# Patient Record
Sex: MALE | Race: WHITE | Employment: UNEMPLOYED | URBAN - METROPOLITAN AREA
[De-identification: names, ages, dates, MRNs, and addresses within clinical notes are randomized per-mention and may not be internally consistent; named-entity substitution may affect disease eponyms.]

---

## 2017-01-01 ENCOUNTER — OFFICE VISIT (OUTPATIENT)
Dept: GERIATRIC MEDICINE | Age: 82
End: 2017-01-01

## 2017-01-01 ENCOUNTER — HOSPITAL ENCOUNTER (OUTPATIENT)
Age: 82
Discharge: NURSING, REHAB OR LONG-TERM CARE | End: 2017-09-12
Attending: INTERNAL MEDICINE | Admitting: INTERNAL MEDICINE
Payer: MEDICARE

## 2017-01-01 ENCOUNTER — APPOINTMENT (OUTPATIENT)
Dept: ULTRASOUND IMAGING | Age: 82
End: 2017-01-01
Attending: INTERNAL MEDICINE
Payer: MEDICARE

## 2017-01-01 ENCOUNTER — APPOINTMENT (OUTPATIENT)
Dept: GENERAL RADIOLOGY | Age: 82
End: 2017-01-01
Payer: MEDICARE

## 2017-01-01 ENCOUNTER — APPOINTMENT (OUTPATIENT)
Dept: CT IMAGING | Age: 82
DRG: 949 | End: 2017-01-01
Attending: PHYSICAL MEDICINE & REHABILITATION
Payer: MEDICARE

## 2017-01-01 ENCOUNTER — HOSPITAL ENCOUNTER (EMERGENCY)
Age: 82
Discharge: HOME | End: 2017-12-24
Attending: EMERGENCY MEDICINE
Payer: MEDICARE

## 2017-01-01 ENCOUNTER — OFFICE VISIT (OUTPATIENT)
Dept: CARDIOLOGY | Age: 82
End: 2017-01-01

## 2017-01-01 ENCOUNTER — HOSPITAL ENCOUNTER (INPATIENT)
Age: 82
LOS: 13 days | Discharge: NURSING, REHAB OR LONG-TERM CARE | DRG: 949 | End: 2017-09-25
Attending: PHYSICAL MEDICINE & REHABILITATION | Admitting: PHYSICAL MEDICINE & REHABILITATION
Payer: MEDICARE

## 2017-01-01 ENCOUNTER — HOSPITAL ENCOUNTER (INPATIENT)
Age: 82
LOS: 5 days | Discharge: NURSING, REHAB OR LONG-TERM CARE | DRG: 949 | End: 2017-09-12
Attending: PHYSICAL MEDICINE & REHABILITATION | Admitting: PHYSICAL MEDICINE & REHABILITATION
Payer: MEDICARE

## 2017-01-01 ENCOUNTER — ANESTHESIA EVENT (OUTPATIENT)
Dept: CARDIAC CATH/INVASIVE PROCEDURES | Age: 82
End: 2017-01-01
Payer: MEDICARE

## 2017-01-01 ENCOUNTER — ANESTHESIA (OUTPATIENT)
Dept: OPERATING ROOM | Age: 82
End: 2017-01-01
Payer: MEDICARE

## 2017-01-01 ENCOUNTER — HOSPITAL ENCOUNTER (OUTPATIENT)
Dept: CARDIAC CATH/INVASIVE PROCEDURES | Age: 82
Discharge: NURSING, REHAB OR LONG-TERM CARE | End: 2017-11-10
Attending: INTERNAL MEDICINE | Admitting: INTERNAL MEDICINE
Payer: MEDICARE

## 2017-01-01 ENCOUNTER — HOSPITAL ENCOUNTER (EMERGENCY)
Age: 82
Discharge: OTHER INSTITUTION | End: 2017-09-03
Attending: STUDENT IN AN ORGANIZED HEALTH CARE EDUCATION/TRAINING PROGRAM
Payer: MEDICARE

## 2017-01-01 ENCOUNTER — HOSPITAL ENCOUNTER (OUTPATIENT)
Dept: ULTRASOUND IMAGING | Age: 82
End: 2017-01-01
Attending: INTERNAL MEDICINE
Payer: MEDICARE

## 2017-01-01 ENCOUNTER — APPOINTMENT (OUTPATIENT)
Dept: GENERAL RADIOLOGY | Age: 82
DRG: 949 | End: 2017-01-01
Attending: PHYSICAL MEDICINE & REHABILITATION
Payer: MEDICARE

## 2017-01-01 ENCOUNTER — APPOINTMENT (OUTPATIENT)
Dept: CT IMAGING | Age: 82
End: 2017-01-01
Payer: MEDICARE

## 2017-01-01 ENCOUNTER — ANESTHESIA EVENT (OUTPATIENT)
Dept: OPERATING ROOM | Age: 82
End: 2017-01-01
Payer: MEDICARE

## 2017-01-01 ENCOUNTER — APPOINTMENT (OUTPATIENT)
Dept: CARDIAC CATH/INVASIVE PROCEDURES | Age: 82
End: 2017-01-01
Attending: INTERNAL MEDICINE
Payer: MEDICARE

## 2017-01-01 ENCOUNTER — ANESTHESIA (OUTPATIENT)
Dept: CARDIAC CATH/INVASIVE PROCEDURES | Age: 82
End: 2017-01-01
Payer: MEDICARE

## 2017-01-01 VITALS
BODY MASS INDEX: 24.71 KG/M2 | TEMPERATURE: 98.2 F | RESPIRATION RATE: 16 BRPM | WEIGHT: 163 LBS | HEART RATE: 63 BPM | OXYGEN SATURATION: 100 % | DIASTOLIC BLOOD PRESSURE: 60 MMHG | SYSTOLIC BLOOD PRESSURE: 103 MMHG | HEIGHT: 68 IN

## 2017-01-01 VITALS — SYSTOLIC BLOOD PRESSURE: 114 MMHG | TEMPERATURE: 98.8 F | DIASTOLIC BLOOD PRESSURE: 60 MMHG | HEART RATE: 70 BPM

## 2017-01-01 VITALS
TEMPERATURE: 97 F | BODY MASS INDEX: 26.68 KG/M2 | HEART RATE: 68 BPM | OXYGEN SATURATION: 98 % | HEIGHT: 67 IN | WEIGHT: 170 LBS | DIASTOLIC BLOOD PRESSURE: 65 MMHG | SYSTOLIC BLOOD PRESSURE: 103 MMHG | RESPIRATION RATE: 16 BRPM

## 2017-01-01 VITALS
WEIGHT: 154 LBS | TEMPERATURE: 97.4 F | OXYGEN SATURATION: 96 % | BODY MASS INDEX: 23.34 KG/M2 | SYSTOLIC BLOOD PRESSURE: 122 MMHG | HEART RATE: 70 BPM | RESPIRATION RATE: 18 BRPM | HEIGHT: 68 IN | DIASTOLIC BLOOD PRESSURE: 60 MMHG

## 2017-01-01 VITALS
WEIGHT: 160.2 LBS | TEMPERATURE: 97.1 F | SYSTOLIC BLOOD PRESSURE: 102 MMHG | HEART RATE: 72 BPM | RESPIRATION RATE: 16 BRPM | BODY MASS INDEX: 24.36 KG/M2 | DIASTOLIC BLOOD PRESSURE: 62 MMHG | OXYGEN SATURATION: 96 %

## 2017-01-01 VITALS
WEIGHT: 159 LBS | HEART RATE: 81 BPM | RESPIRATION RATE: 18 BRPM | DIASTOLIC BLOOD PRESSURE: 70 MMHG | OXYGEN SATURATION: 94 % | TEMPERATURE: 97 F | SYSTOLIC BLOOD PRESSURE: 92 MMHG | HEIGHT: 68 IN | BODY MASS INDEX: 24.1 KG/M2

## 2017-01-01 VITALS — DIASTOLIC BLOOD PRESSURE: 60 MMHG | HEART RATE: 67 BPM | SYSTOLIC BLOOD PRESSURE: 110 MMHG

## 2017-01-01 VITALS
WEIGHT: 180 LBS | OXYGEN SATURATION: 99 % | TEMPERATURE: 36 F | RESPIRATION RATE: 14 BRPM | SYSTOLIC BLOOD PRESSURE: 172 MMHG | DIASTOLIC BLOOD PRESSURE: 79 MMHG | HEART RATE: 82 BPM | BODY MASS INDEX: 28.25 KG/M2 | HEIGHT: 67 IN

## 2017-01-01 VITALS
TEMPERATURE: 98.4 F | HEART RATE: 67 BPM | DIASTOLIC BLOOD PRESSURE: 101 MMHG | OXYGEN SATURATION: 99 % | SYSTOLIC BLOOD PRESSURE: 176 MMHG | RESPIRATION RATE: 20 BRPM

## 2017-01-01 VITALS
HEIGHT: 68 IN | BODY MASS INDEX: 25.56 KG/M2 | TEMPERATURE: 98.6 F | RESPIRATION RATE: 20 BRPM | OXYGEN SATURATION: 100 % | WEIGHT: 168.65 LBS | SYSTOLIC BLOOD PRESSURE: 100 MMHG | HEART RATE: 72 BPM | DIASTOLIC BLOOD PRESSURE: 52 MMHG

## 2017-01-01 VITALS
SYSTOLIC BLOOD PRESSURE: 189 MMHG | OXYGEN SATURATION: 95 % | RESPIRATION RATE: 20 BRPM | DIASTOLIC BLOOD PRESSURE: 89 MMHG

## 2017-01-01 VITALS
SYSTOLIC BLOOD PRESSURE: 80 MMHG | TEMPERATURE: 97.1 F | DIASTOLIC BLOOD PRESSURE: 60 MMHG | OXYGEN SATURATION: 98 % | HEART RATE: 88 BPM | RESPIRATION RATE: 16 BRPM

## 2017-01-01 VITALS
HEIGHT: 68 IN | BODY MASS INDEX: 22.73 KG/M2 | HEART RATE: 76 BPM | DIASTOLIC BLOOD PRESSURE: 62 MMHG | OXYGEN SATURATION: 96 % | SYSTOLIC BLOOD PRESSURE: 125 MMHG | RESPIRATION RATE: 19 BRPM | WEIGHT: 150 LBS | TEMPERATURE: 97.9 F

## 2017-01-01 VITALS
HEIGHT: 68 IN | OXYGEN SATURATION: 95 % | RESPIRATION RATE: 16 BRPM | DIASTOLIC BLOOD PRESSURE: 53 MMHG | SYSTOLIC BLOOD PRESSURE: 106 MMHG | BODY MASS INDEX: 24.1 KG/M2 | HEART RATE: 60 BPM | WEIGHT: 159 LBS | TEMPERATURE: 98.6 F

## 2017-01-01 VITALS — RESPIRATION RATE: 12 BRPM | SYSTOLIC BLOOD PRESSURE: 136 MMHG | HEART RATE: 43 BPM | DIASTOLIC BLOOD PRESSURE: 68 MMHG

## 2017-01-01 VITALS
OXYGEN SATURATION: 96 % | HEART RATE: 75 BPM | TEMPERATURE: 97.8 F | WEIGHT: 180 LBS | HEIGHT: 67 IN | DIASTOLIC BLOOD PRESSURE: 88 MMHG | BODY MASS INDEX: 28.25 KG/M2 | RESPIRATION RATE: 12 BRPM | SYSTOLIC BLOOD PRESSURE: 164 MMHG

## 2017-01-01 VITALS — OXYGEN SATURATION: 98 % | SYSTOLIC BLOOD PRESSURE: 151 MMHG | DIASTOLIC BLOOD PRESSURE: 80 MMHG

## 2017-01-01 VITALS
TEMPERATURE: 96.1 F | HEART RATE: 82 BPM | SYSTOLIC BLOOD PRESSURE: 113 MMHG | DIASTOLIC BLOOD PRESSURE: 60 MMHG | RESPIRATION RATE: 14 BRPM

## 2017-01-01 DIAGNOSIS — R41.0 CONFUSION AND DISORIENTATION: ICD-10-CM

## 2017-01-01 DIAGNOSIS — T14.8XXA HEMATOMA: ICD-10-CM

## 2017-01-01 DIAGNOSIS — S06.359A: Primary | ICD-10-CM

## 2017-01-01 DIAGNOSIS — R55 SYNCOPE AND COLLAPSE: ICD-10-CM

## 2017-01-01 DIAGNOSIS — N40.0 BENIGN PROSTATIC HYPERPLASIA, PRESENCE OF LOWER URINARY TRACT SYMPTOMS UNSPECIFIED, UNSPECIFIED MORPHOLOGY: ICD-10-CM

## 2017-01-01 DIAGNOSIS — R53.1 WEAKNESS: ICD-10-CM

## 2017-01-01 DIAGNOSIS — E78.5 DYSLIPIDEMIA: Primary | ICD-10-CM

## 2017-01-01 DIAGNOSIS — I15.9 SECONDARY HYPERTENSION: ICD-10-CM

## 2017-01-01 DIAGNOSIS — S00.83XA FACIAL CONTUSION, INITIAL ENCOUNTER: ICD-10-CM

## 2017-01-01 DIAGNOSIS — I15.9 SECONDARY HYPERTENSION: Primary | ICD-10-CM

## 2017-01-01 DIAGNOSIS — R26.81 UNSTEADINESS: Primary | ICD-10-CM

## 2017-01-01 DIAGNOSIS — I70.229 CRITICAL LOWER LIMB ISCHEMIA (HCC): ICD-10-CM

## 2017-01-01 DIAGNOSIS — I10 ESSENTIAL HYPERTENSION: ICD-10-CM

## 2017-01-01 DIAGNOSIS — R21 RASH: ICD-10-CM

## 2017-01-01 DIAGNOSIS — Z95.0 PACEMAKER: ICD-10-CM

## 2017-01-01 DIAGNOSIS — E11.8 DM TYPE 2, CONTROLLED, WITH COMPLICATION (HCC): ICD-10-CM

## 2017-01-01 DIAGNOSIS — E78.2 MIXED HYPERLIPIDEMIA: ICD-10-CM

## 2017-01-01 DIAGNOSIS — R46.89 AGGRESSION: ICD-10-CM

## 2017-01-01 DIAGNOSIS — I95.9 HYPOTENSION, UNSPECIFIED HYPOTENSION TYPE: ICD-10-CM

## 2017-01-01 DIAGNOSIS — E78.5 DYSLIPIDEMIA: ICD-10-CM

## 2017-01-01 DIAGNOSIS — R62.7 ADULT FAILURE TO THRIVE: ICD-10-CM

## 2017-01-01 DIAGNOSIS — Z79.01 ANTICOAGULATED BY ANTICOAGULATION TREATMENT: ICD-10-CM

## 2017-01-01 DIAGNOSIS — R53.1 GENERALIZED WEAKNESS: ICD-10-CM

## 2017-01-01 DIAGNOSIS — R13.10 DYSPHAGIA, UNSPECIFIED TYPE: ICD-10-CM
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
## 2017-01-01 DIAGNOSIS — E11.9 TYPE 2 DIABETES MELLITUS WITHOUT COMPLICATION, UNSPECIFIED LONG TERM INSULIN USE STATUS: ICD-10-CM

## 2017-01-01 DIAGNOSIS — I61.9 HEMORRHAGIC CEREBROVASCULAR ACCIDENT (CVA) (HCC): Primary | ICD-10-CM

## 2017-01-01 DIAGNOSIS — R53.1 GENERALIZED WEAKNESS: Primary | ICD-10-CM

## 2017-01-01 DIAGNOSIS — K21.9 GASTROESOPHAGEAL REFLUX DISEASE, ESOPHAGITIS PRESENCE NOT SPECIFIED: ICD-10-CM

## 2017-01-01 DIAGNOSIS — R47.02 DYSPHASIA: Primary | ICD-10-CM

## 2017-01-01 DIAGNOSIS — I73.9 PAD (PERIPHERAL ARTERY DISEASE) (HCC): Primary | ICD-10-CM
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
## 2017-01-01 DIAGNOSIS — I48.91 ATRIAL FIBRILLATION, UNSPECIFIED TYPE (HCC): ICD-10-CM

## 2017-01-01 DIAGNOSIS — I48.91 ATRIAL FIBRILLATION, UNSPECIFIED TYPE (HCC): Primary | ICD-10-CM

## 2017-01-01 DIAGNOSIS — D64.9 ANEMIA, UNSPECIFIED TYPE: ICD-10-CM

## 2017-01-01 DIAGNOSIS — S90.821A BLISTER OF RIGHT FOOT, INITIAL ENCOUNTER: Primary | ICD-10-CM

## 2017-01-01 DIAGNOSIS — N30.00 ACUTE CYSTITIS WITHOUT HEMATURIA: Primary | ICD-10-CM

## 2017-01-01 DIAGNOSIS — I95.89 OTHER SPECIFIED HYPOTENSION: ICD-10-CM

## 2017-01-01 LAB
ABO/RH: NORMAL
ALBUMIN SERPL-MCNC: 3.3 G/DL (ref 3.9–4.9)
ALBUMIN SERPL-MCNC: 3.4 G/DL (ref 3.9–4.9)
ALBUMIN SERPL-MCNC: 4 G/DL (ref 3.9–4.9)
ALP BLD-CCNC: 459 U/L (ref 35–104)
ALP BLD-CCNC: 529 U/L (ref 35–104)
ALP BLD-CCNC: 611 U/L (ref 35–104)
ALT SERPL-CCNC: 27 U/L (ref 0–41)
ALT SERPL-CCNC: 79 U/L (ref 0–41)
ALT SERPL-CCNC: 80 U/L (ref 0–41)
AMMONIA: 47 UMOL/L (ref 16–60)
AMORPHOUS: ABNORMAL
ANION GAP SERPL CALCULATED.3IONS-SCNC: 10 MEQ/L (ref 7–13)
ANION GAP SERPL CALCULATED.3IONS-SCNC: 10 MEQ/L (ref 7–13)
ANION GAP SERPL CALCULATED.3IONS-SCNC: 12 MEQ/L (ref 7–13)
ANION GAP SERPL CALCULATED.3IONS-SCNC: 13 MEQ/L (ref 7–13)
ANION GAP SERPL CALCULATED.3IONS-SCNC: 14 MEQ/L (ref 7–13)
ANION GAP SERPL CALCULATED.3IONS-SCNC: 15 MEQ/L (ref 7–13)
ANION GAP SERPL CALCULATED.3IONS-SCNC: 16 MEQ/L (ref 7–13)
ANION GAP SERPL CALCULATED.3IONS-SCNC: 16 MEQ/L (ref 7–13)
ANION GAP SERPL CALCULATED.3IONS-SCNC: 17 MEQ/L (ref 7–13)
ANION GAP SERPL CALCULATED.3IONS-SCNC: 18 MEQ/L (ref 7–13)
ANION GAP SERPL CALCULATED.3IONS-SCNC: 18 MEQ/L (ref 7–13)
ANION GAP SERPL CALCULATED.3IONS-SCNC: 21 MEQ/L (ref 7–13)
ANTIBODY SCREEN: NORMAL
APTT: 20.8 SEC (ref 21.6–35.4)
AST SERPL-CCNC: 27 U/L (ref 0–40)
AST SERPL-CCNC: 77 U/L (ref 0–40)
AST SERPL-CCNC: 78 U/L (ref 0–40)
BACTERIA: ABNORMAL /HPF
BACTERIA: NORMAL /HPF
BASOPHILS ABSOLUTE: 0 K/UL (ref 0–0.2)
BASOPHILS ABSOLUTE: 0.1 K/UL (ref 0–0.2)
BASOPHILS RELATIVE PERCENT: 0.3 %
BASOPHILS RELATIVE PERCENT: 0.9 %
BILIRUB SERPL-MCNC: 0.7 MG/DL (ref 0–1.2)
BILIRUB SERPL-MCNC: 1.1 MG/DL (ref 0–1.2)
BILIRUB SERPL-MCNC: 1.2 MG/DL (ref 0–1.2)
BILIRUBIN URINE: NEGATIVE
BLOOD, URINE: NEGATIVE
BUN BLDV-MCNC: 13 MG/DL (ref 8–23)
BUN BLDV-MCNC: 14 MG/DL (ref 8–23)
BUN BLDV-MCNC: 18 MG/DL (ref 8–23)
BUN BLDV-MCNC: 22 MG/DL (ref 8–23)
BUN BLDV-MCNC: 24 MG/DL (ref 8–23)
BUN BLDV-MCNC: 26 MG/DL (ref 8–23)
BUN BLDV-MCNC: 27 MG/DL (ref 8–23)
BUN BLDV-MCNC: 33 MG/DL (ref 8–23)
BUN BLDV-MCNC: 38 MG/DL (ref 8–23)
BUN BLDV-MCNC: 40 MG/DL (ref 8–23)
BUN BLDV-MCNC: 43 MG/DL (ref 8–23)
BUN BLDV-MCNC: 43 MG/DL (ref 8–23)
BUN BLDV-MCNC: 44 MG/DL (ref 8–23)
BUN BLDV-MCNC: 59 MG/DL (ref 8–23)
C-REACTIVE PROTEIN, HIGH SENSITIVITY: 13.2 MG/L (ref 0–5)
CALCIUM SERPL-MCNC: 8.2 MG/DL (ref 8.6–10.2)
CALCIUM SERPL-MCNC: 8.3 MG/DL (ref 8.6–10.2)
CALCIUM SERPL-MCNC: 8.7 MG/DL (ref 8.6–10.2)
CALCIUM SERPL-MCNC: 8.9 MG/DL (ref 8.6–10.2)
CALCIUM SERPL-MCNC: 8.9 MG/DL (ref 8.6–10.2)
CALCIUM SERPL-MCNC: 9 MG/DL (ref 8.6–10.2)
CALCIUM SERPL-MCNC: 9.1 MG/DL (ref 8.6–10.2)
CALCIUM SERPL-MCNC: 9.1 MG/DL (ref 8.6–10.2)
CALCIUM SERPL-MCNC: 9.2 MG/DL (ref 8.6–10.2)
CALCIUM SERPL-MCNC: 9.3 MG/DL (ref 8.6–10.2)
CALCIUM SERPL-MCNC: 9.4 MG/DL (ref 8.6–10.2)
CALCIUM SERPL-MCNC: 9.4 MG/DL (ref 8.6–10.2)
CASTS: ABNORMAL /LPF
CHLORIDE BLD-SCNC: 100 MEQ/L (ref 98–107)
CHLORIDE BLD-SCNC: 100 MEQ/L (ref 98–107)
CHLORIDE BLD-SCNC: 101 MEQ/L (ref 98–107)
CHLORIDE BLD-SCNC: 102 MEQ/L (ref 98–107)
CHLORIDE BLD-SCNC: 103 MEQ/L (ref 98–107)
CHLORIDE BLD-SCNC: 104 MEQ/L (ref 98–107)
CHLORIDE BLD-SCNC: 106 MEQ/L (ref 98–107)
CHLORIDE BLD-SCNC: 92 MEQ/L (ref 98–107)
CHLORIDE BLD-SCNC: 93 MEQ/L (ref 98–107)
CHLORIDE BLD-SCNC: 94 MEQ/L (ref 98–107)
CHLORIDE BLD-SCNC: 95 MEQ/L (ref 98–107)
CHLORIDE BLD-SCNC: 95 MEQ/L (ref 98–107)
CHLORIDE BLD-SCNC: 97 MEQ/L (ref 98–107)
CHLORIDE BLD-SCNC: 99 MEQ/L (ref 98–107)
CHOLESTEROL, TOTAL: 124 MG/DL (ref 0–199)
CHOLESTEROL, TOTAL: 140 MG/DL (ref 0–199)
CHP ED QC CHECK: NORMAL
CK MB: 9.2 NG/ML (ref 0–6.7)
CLARITY: ABNORMAL
CLARITY: CLEAR
CO2: 18 MEQ/L (ref 22–29)
CO2: 21 MEQ/L (ref 22–29)
CO2: 22 MEQ/L (ref 22–29)
CO2: 22 MEQ/L (ref 22–29)
CO2: 23 MEQ/L (ref 22–29)
CO2: 23 MEQ/L (ref 22–29)
CO2: 24 MEQ/L (ref 22–29)
CO2: 24 MEQ/L (ref 22–29)
CO2: 26 MEQ/L (ref 22–29)
CO2: 27 MEQ/L (ref 22–29)
CO2: 27 MEQ/L (ref 22–29)
CO2: 28 MEQ/L (ref 22–29)
CO2: 28 MEQ/L (ref 22–29)
CO2: 29 MEQ/L (ref 22–29)
COLOR: YELLOW
CREAT SERPL-MCNC: 0.73 MG/DL (ref 0.7–1.2)
CREAT SERPL-MCNC: 0.82 MG/DL (ref 0.7–1.2)
CREAT SERPL-MCNC: 0.83 MG/DL (ref 0.7–1.2)
CREAT SERPL-MCNC: 0.84 MG/DL (ref 0.7–1.2)
CREAT SERPL-MCNC: 0.86 MG/DL (ref 0.7–1.2)
CREAT SERPL-MCNC: 0.86 MG/DL (ref 0.7–1.2)
CREAT SERPL-MCNC: 0.9 MG/DL (ref 0.7–1.2)
CREAT SERPL-MCNC: 0.95 MG/DL (ref 0.7–1.2)
CREAT SERPL-MCNC: 1.03 MG/DL (ref 0.7–1.2)
CREAT SERPL-MCNC: 1.22 MG/DL (ref 0.7–1.2)
CREAT SERPL-MCNC: 1.26 MG/DL (ref 0.7–1.2)
CREATINE KINASE-MB INDEX: 2.3 % (ref 0–3.5)
D DIMER: 0.57 MG/L FEU (ref 0–0.5)
EKG ATRIAL RATE: 102 BPM
EKG Q-T INTERVAL: 418 MS
EKG QRS DURATION: 72 MS
EKG QTC CALCULATION (BAZETT): 482 MS
EKG R AXIS: 1 DEGREES
EKG T AXIS: -84 DEGREES
EKG VENTRICULAR RATE: 80 BPM
EOSINOPHILS ABSOLUTE: 0 K/UL (ref 0–0.7)
EOSINOPHILS ABSOLUTE: 0.8 K/UL (ref 0–0.7)
EOSINOPHILS RELATIVE PERCENT: 0 %
EOSINOPHILS RELATIVE PERCENT: 14.6 %
EPITHELIAL CELLS, UA: ABNORMAL /HPF
ETHANOL PERCENT: NORMAL G/DL
ETHANOL: <10 MG/DL (ref 0–0.08)
GFR AFRICAN AMERICAN: >60
GFR NON-AFRICAN AMERICAN: 54
GFR NON-AFRICAN AMERICAN: 56.1
GFR NON-AFRICAN AMERICAN: >60
GLOBULIN: 2.9 G/DL (ref 2.3–3.5)
GLOBULIN: 3.2 G/DL (ref 2.3–3.5)
GLOBULIN: 3.5 G/DL (ref 2.3–3.5)
GLUCOSE BLD-MCNC: 101 MG/DL (ref 60–115)
GLUCOSE BLD-MCNC: 102 MG/DL (ref 60–115)
GLUCOSE BLD-MCNC: 103 MG/DL (ref 60–115)
GLUCOSE BLD-MCNC: 103 MG/DL (ref 60–115)
GLUCOSE BLD-MCNC: 106 MG/DL (ref 60–115)
GLUCOSE BLD-MCNC: 106 MG/DL (ref 60–115)
GLUCOSE BLD-MCNC: 107 MG/DL (ref 60–115)
GLUCOSE BLD-MCNC: 107 MG/DL (ref 60–115)
GLUCOSE BLD-MCNC: 109 MG/DL (ref 60–115)
GLUCOSE BLD-MCNC: 111 MG/DL (ref 60–115)
GLUCOSE BLD-MCNC: 115 MG/DL (ref 60–115)
GLUCOSE BLD-MCNC: 115 MG/DL (ref 74–109)
GLUCOSE BLD-MCNC: 117 MG/DL (ref 60–115)
GLUCOSE BLD-MCNC: 117 MG/DL (ref 60–115)
GLUCOSE BLD-MCNC: 118 MG/DL (ref 60–115)
GLUCOSE BLD-MCNC: 119 MG/DL (ref 60–115)
GLUCOSE BLD-MCNC: 120 MG/DL (ref 60–115)
GLUCOSE BLD-MCNC: 121 MG/DL (ref 74–109)
GLUCOSE BLD-MCNC: 121 MG/DL (ref 74–109)
GLUCOSE BLD-MCNC: 126 MG/DL (ref 60–115)
GLUCOSE BLD-MCNC: 128 MG/DL (ref 74–109)
GLUCOSE BLD-MCNC: 129 MG/DL (ref 60–115)
GLUCOSE BLD-MCNC: 130 MG/DL (ref 60–115)
GLUCOSE BLD-MCNC: 132 MG/DL (ref 60–115)
GLUCOSE BLD-MCNC: 134 MG/DL (ref 60–115)
GLUCOSE BLD-MCNC: 141 MG/DL (ref 60–115)
GLUCOSE BLD-MCNC: 141 MG/DL (ref 74–109)
GLUCOSE BLD-MCNC: 143 MG/DL (ref 60–115)
GLUCOSE BLD-MCNC: 145 MG/DL (ref 60–115)
GLUCOSE BLD-MCNC: 145 MG/DL (ref 74–109)
GLUCOSE BLD-MCNC: 146 MG/DL (ref 60–115)
GLUCOSE BLD-MCNC: 147 MG/DL (ref 60–115)
GLUCOSE BLD-MCNC: 147 MG/DL (ref 60–115)
GLUCOSE BLD-MCNC: 147 MG/DL (ref 74–109)
GLUCOSE BLD-MCNC: 148 MG/DL (ref 60–115)
GLUCOSE BLD-MCNC: 148 MG/DL (ref 60–115)
GLUCOSE BLD-MCNC: 149 MG/DL (ref 60–115)
GLUCOSE BLD-MCNC: 151 MG/DL (ref 60–115)
GLUCOSE BLD-MCNC: 152 MG/DL (ref 60–115)
GLUCOSE BLD-MCNC: 152 MG/DL (ref 60–115)
GLUCOSE BLD-MCNC: 155 MG/DL (ref 60–115)
GLUCOSE BLD-MCNC: 155 MG/DL (ref 60–115)
GLUCOSE BLD-MCNC: 157 MG/DL (ref 60–115)
GLUCOSE BLD-MCNC: 158 MG/DL (ref 60–115)
GLUCOSE BLD-MCNC: 160 MG/DL (ref 60–115)
GLUCOSE BLD-MCNC: 161 MG/DL (ref 60–115)
GLUCOSE BLD-MCNC: 162 MG/DL (ref 60–115)
GLUCOSE BLD-MCNC: 167 MG/DL (ref 60–115)
GLUCOSE BLD-MCNC: 169 MG/DL (ref 60–115)
GLUCOSE BLD-MCNC: 170 MG/DL (ref 60–115)
GLUCOSE BLD-MCNC: 170 MG/DL (ref 60–115)
GLUCOSE BLD-MCNC: 173 MG/DL (ref 60–115)
GLUCOSE BLD-MCNC: 175 MG/DL (ref 60–115)
GLUCOSE BLD-MCNC: 176 MG/DL (ref 60–115)
GLUCOSE BLD-MCNC: 176 MG/DL (ref 60–115)
GLUCOSE BLD-MCNC: 181 MG/DL (ref 60–115)
GLUCOSE BLD-MCNC: 183 MG/DL (ref 60–115)
GLUCOSE BLD-MCNC: 185 MG/DL (ref 60–115)
GLUCOSE BLD-MCNC: 186 MG/DL
GLUCOSE BLD-MCNC: 186 MG/DL (ref 60–115)
GLUCOSE BLD-MCNC: 187 MG/DL
GLUCOSE BLD-MCNC: 187 MG/DL
GLUCOSE BLD-MCNC: 187 MG/DL (ref 60–115)
GLUCOSE BLD-MCNC: 187 MG/DL (ref 60–115)
GLUCOSE BLD-MCNC: 188 MG/DL (ref 60–115)
GLUCOSE BLD-MCNC: 188 MG/DL (ref 74–109)
GLUCOSE BLD-MCNC: 191 MG/DL (ref 60–115)
GLUCOSE BLD-MCNC: 192 MG/DL (ref 60–115)
GLUCOSE BLD-MCNC: 192 MG/DL (ref 60–115)
GLUCOSE BLD-MCNC: 198 MG/DL (ref 60–115)
GLUCOSE BLD-MCNC: 202 MG/DL (ref 60–115)
GLUCOSE BLD-MCNC: 203 MG/DL (ref 60–115)
GLUCOSE BLD-MCNC: 207 MG/DL (ref 60–115)
GLUCOSE BLD-MCNC: 217 MG/DL (ref 60–115)
GLUCOSE BLD-MCNC: 71 MG/DL (ref 60–115)
GLUCOSE BLD-MCNC: 80 MG/DL (ref 74–109)
GLUCOSE BLD-MCNC: 85 MG/DL (ref 60–115)
GLUCOSE BLD-MCNC: 86 MG/DL (ref 60–115)
GLUCOSE BLD-MCNC: 86 MG/DL (ref 74–109)
GLUCOSE BLD-MCNC: 88 MG/DL (ref 60–115)
GLUCOSE BLD-MCNC: 89 MG/DL (ref 60–115)
GLUCOSE BLD-MCNC: 90 MG/DL (ref 60–115)
GLUCOSE BLD-MCNC: 90 MG/DL (ref 60–115)
GLUCOSE BLD-MCNC: 92 MG/DL (ref 60–115)
GLUCOSE BLD-MCNC: 93 MG/DL (ref 60–115)
GLUCOSE BLD-MCNC: 94 MG/DL (ref 60–115)
GLUCOSE BLD-MCNC: 94 MG/DL (ref 74–109)
GLUCOSE BLD-MCNC: 94 MG/DL (ref 74–109)
GLUCOSE BLD-MCNC: 96 MG/DL (ref 74–109)
GLUCOSE BLD-MCNC: 98 MG/DL (ref 60–115)
GLUCOSE BLD-MCNC: 99 MG/DL (ref 74–109)
GLUCOSE URINE: NEGATIVE MG/DL
HBA1C MFR BLD: 6.3 % (ref 4.8–5.9)
HCT VFR BLD CALC: 34.4 % (ref 42–52)
HCT VFR BLD CALC: 36.3 % (ref 42–52)
HCT VFR BLD CALC: 38.2 % (ref 42–52)
HCT VFR BLD CALC: 39.8 % (ref 42–52)
HCT VFR BLD CALC: 40 % (ref 42–52)
HCT VFR BLD CALC: 40.5 % (ref 42–52)
HCT VFR BLD CALC: 40.8 % (ref 42–52)
HCT VFR BLD CALC: 40.9 % (ref 42–52)
HCT VFR BLD CALC: 41.7 % (ref 42–52)
HCT VFR BLD CALC: 42.4 % (ref 42–52)
HCT VFR BLD CALC: 42.7 % (ref 42–52)
HDLC SERPL-MCNC: 40 MG/DL (ref 40–59)
HDLC SERPL-MCNC: 75 MG/DL (ref 40–59)
HEMOGLOBIN: 11.4 G/DL (ref 14–18)
HEMOGLOBIN: 11.8 G/DL (ref 14–18)
HEMOGLOBIN: 12.8 G/DL (ref 14–18)
HEMOGLOBIN: 13 G/DL (ref 14–18)
HEMOGLOBIN: 13.1 G/DL (ref 14–18)
HEMOGLOBIN: 13.3 G/DL (ref 14–18)
HEMOGLOBIN: 13.4 G/DL (ref 14–18)
HEMOGLOBIN: 13.5 G/DL (ref 14–18)
HEMOGLOBIN: 13.7 G/DL (ref 14–18)
HEMOGLOBIN: 13.8 G/DL (ref 14–18)
HEMOGLOBIN: 14.2 G/DL (ref 14–18)
INR BLD: 1.1
KETONES, URINE: NEGATIVE MG/DL
LDL CHOLESTEROL CALCULATED: 51 MG/DL (ref 0–129)
LDL CHOLESTEROL CALCULATED: 63 MG/DL (ref 0–129)
LEUKOCYTE ESTERASE, URINE: ABNORMAL
LEUKOCYTE ESTERASE, URINE: NEGATIVE
LYMPHOCYTES ABSOLUTE: 0.8 K/UL (ref 1–4.8)
LYMPHOCYTES ABSOLUTE: 1.2 K/UL (ref 1–4.8)
LYMPHOCYTES RELATIVE PERCENT: 20.5 %
LYMPHOCYTES RELATIVE PERCENT: 7.7 %
MAGNESIUM: 1.7 MG/DL (ref 1.7–2.3)
MAGNESIUM: 1.9 MG/DL (ref 1.7–2.3)
MCH RBC QN AUTO: 30.5 PG (ref 27–31.3)
MCH RBC QN AUTO: 30.6 PG (ref 27–31.3)
MCH RBC QN AUTO: 30.6 PG (ref 27–31.3)
MCH RBC QN AUTO: 30.7 PG (ref 27–31.3)
MCH RBC QN AUTO: 30.9 PG (ref 27–31.3)
MCH RBC QN AUTO: 31 PG (ref 27–31.3)
MCH RBC QN AUTO: 31.9 PG (ref 27–31.3)
MCH RBC QN AUTO: 32.4 PG (ref 27–31.3)
MCH RBC QN AUTO: 32.7 PG (ref 27–31.3)
MCHC RBC AUTO-ENTMCNC: 32.4 % (ref 33–37)
MCHC RBC AUTO-ENTMCNC: 32.4 % (ref 33–37)
MCHC RBC AUTO-ENTMCNC: 32.6 % (ref 33–37)
MCHC RBC AUTO-ENTMCNC: 32.7 % (ref 33–37)
MCHC RBC AUTO-ENTMCNC: 32.7 % (ref 33–37)
MCHC RBC AUTO-ENTMCNC: 32.8 % (ref 33–37)
MCHC RBC AUTO-ENTMCNC: 33.1 % (ref 33–37)
MCHC RBC AUTO-ENTMCNC: 33.5 % (ref 33–37)
MCHC RBC AUTO-ENTMCNC: 33.6 % (ref 33–37)
MCV RBC AUTO: 92.6 FL (ref 80–100)
MCV RBC AUTO: 93.2 FL (ref 80–100)
MCV RBC AUTO: 93.3 FL (ref 80–100)
MCV RBC AUTO: 93.4 FL (ref 80–100)
MCV RBC AUTO: 93.7 FL (ref 80–100)
MCV RBC AUTO: 94.1 FL (ref 80–100)
MCV RBC AUTO: 94.5 FL (ref 80–100)
MCV RBC AUTO: 94.6 FL (ref 80–100)
MCV RBC AUTO: 96.4 FL (ref 80–100)
MCV RBC AUTO: 96.4 FL (ref 80–100)
MCV RBC AUTO: 97.5 FL (ref 80–100)
MONOCYTES ABSOLUTE: 0.9 K/UL (ref 0.2–0.8)
MONOCYTES ABSOLUTE: 0.9 K/UL (ref 0.2–0.8)
MONOCYTES RELATIVE PERCENT: 16.6 %
MONOCYTES RELATIVE PERCENT: 8.1 %
NEUTROPHILS ABSOLUTE: 2.7 K/UL (ref 1.4–6.5)
NEUTROPHILS ABSOLUTE: 8.9 K/UL (ref 1.4–6.5)
NEUTROPHILS RELATIVE PERCENT: 47.4 %
NEUTROPHILS RELATIVE PERCENT: 83.9 %
NITRITE, URINE: NEGATIVE
ORGANISM: ABNORMAL
PDW BLD-RTO: 14.1 % (ref 11.5–14.5)
PDW BLD-RTO: 14.2 % (ref 11.5–14.5)
PDW BLD-RTO: 14.4 % (ref 11.5–14.5)
PDW BLD-RTO: 14.5 % (ref 11.5–14.5)
PDW BLD-RTO: 14.5 % (ref 11.5–14.5)
PDW BLD-RTO: 14.8 % (ref 11.5–14.5)
PDW BLD-RTO: 14.9 % (ref 11.5–14.5)
PDW BLD-RTO: 15.4 % (ref 11.5–14.5)
PERFORMED ON: ABNORMAL
PERFORMED ON: NORMAL
PH UA: 5.5 (ref 5–9)
PH UA: 6 (ref 5–9)
PH UA: 6 (ref 5–9)
PH UA: 8.5 (ref 5–9)
PLATELET # BLD: 115 K/UL (ref 130–400)
PLATELET # BLD: 117 K/UL (ref 130–400)
PLATELET # BLD: 119 K/UL (ref 130–400)
PLATELET # BLD: 122 K/UL (ref 130–400)
PLATELET # BLD: 124 K/UL (ref 130–400)
PLATELET # BLD: 127 K/UL (ref 130–400)
PLATELET # BLD: 131 K/UL (ref 130–400)
PLATELET # BLD: 134 K/UL (ref 130–400)
PLATELET # BLD: 149 K/UL (ref 130–400)
PLATELET # BLD: 150 K/UL (ref 130–400)
PLATELET # BLD: 187 K/UL (ref 130–400)
POC ACTIVATED CLOTTING TIME KAOLIN: 191 SEC (ref 82–152)
POC ACTIVATED CLOTTING TIME KAOLIN: 219 SEC (ref 82–152)
POC ACTIVATED CLOTTING TIME KAOLIN: 235 SEC (ref 82–152)
POC SAMPLE TYPE: ABNORMAL
POTASSIUM SERPL-SCNC: 3.5 MEQ/L (ref 3.5–5.1)
POTASSIUM SERPL-SCNC: 3.6 MEQ/L (ref 3.5–5.1)
POTASSIUM SERPL-SCNC: 3.8 MEQ/L (ref 3.5–5.1)
POTASSIUM SERPL-SCNC: 3.8 MEQ/L (ref 3.5–5.1)
POTASSIUM SERPL-SCNC: 3.9 MEQ/L (ref 3.5–5.1)
POTASSIUM SERPL-SCNC: 4 MEQ/L (ref 3.5–5.1)
POTASSIUM SERPL-SCNC: 4.1 MEQ/L (ref 3.5–5.1)
POTASSIUM SERPL-SCNC: 4.2 MEQ/L (ref 3.5–5.1)
POTASSIUM SERPL-SCNC: 4.3 MEQ/L (ref 3.5–5.1)
POTASSIUM SERPL-SCNC: 4.4 MEQ/L (ref 3.5–5.1)
POTASSIUM SERPL-SCNC: 4.5 MEQ/L (ref 3.5–5.1)
POTASSIUM SERPL-SCNC: 5.5 MEQ/L (ref 3.5–5.1)
PRO-BNP: 4057 PG/ML
PROTEIN UA: NEGATIVE MG/DL
PROTHROMBIN TIME: 11.9 SEC (ref 8.1–13.7)
RBC # BLD: 3.57 M/UL (ref 4.7–6.1)
RBC # BLD: 3.86 M/UL (ref 4.7–6.1)
RBC # BLD: 3.92 M/UL (ref 4.7–6.1)
RBC # BLD: 4.13 M/UL (ref 4.7–6.1)
RBC # BLD: 4.27 M/UL (ref 4.7–6.1)
RBC # BLD: 4.28 M/UL (ref 4.7–6.1)
RBC # BLD: 4.31 M/UL (ref 4.7–6.1)
RBC # BLD: 4.41 M/UL (ref 4.7–6.1)
RBC # BLD: 4.46 M/UL (ref 4.7–6.1)
RBC # BLD: 4.55 M/UL (ref 4.7–6.1)
RBC # BLD: 4.57 M/UL (ref 4.7–6.1)
RBC UA: ABNORMAL /HPF (ref 0–2)
RBC UA: ABNORMAL /HPF (ref 0–2)
RBC UA: NORMAL /HPF (ref 0–2)
SODIUM BLD-SCNC: 135 MEQ/L (ref 132–144)
SODIUM BLD-SCNC: 137 MEQ/L (ref 132–144)
SODIUM BLD-SCNC: 138 MEQ/L (ref 132–144)
SODIUM BLD-SCNC: 140 MEQ/L (ref 132–144)
SODIUM BLD-SCNC: 140 MEQ/L (ref 132–144)
SODIUM BLD-SCNC: 143 MEQ/L (ref 132–144)
SPECIFIC GRAVITY UA: 1 (ref 1–1.03)
SPECIFIC GRAVITY UA: 1.01 (ref 1–1.03)
SPECIFIC GRAVITY UA: 1.02 (ref 1–1.03)
SPECIFIC GRAVITY UA: 1.02 (ref 1–1.03)
TOTAL CK: 405 U/L (ref 0–190)
TOTAL PROTEIN: 6.2 G/DL (ref 6.4–8.1)
TOTAL PROTEIN: 6.6 G/DL (ref 6.4–8.1)
TOTAL PROTEIN: 7.5 G/DL (ref 6.4–8.1)
TRIGL SERPL-MCNC: 103 MG/DL (ref 0–200)
TRIGL SERPL-MCNC: 68 MG/DL (ref 0–200)
TROPONIN: <0.01 NG/ML (ref 0–0.01)
URINE CULTURE, ROUTINE: ABNORMAL
URINE CULTURE, ROUTINE: ABNORMAL
URINE REFLEX TO CULTURE: NORMAL
UROBILINOGEN, URINE: 1 E.U./DL
WBC # BLD: 10.6 K/UL (ref 4.8–10.8)
WBC # BLD: 10.6 K/UL (ref 4.8–10.8)
WBC # BLD: 5.7 K/UL (ref 4.8–10.8)
WBC # BLD: 6.3 K/UL (ref 4.8–10.8)
WBC # BLD: 6.4 K/UL (ref 4.8–10.8)
WBC # BLD: 6.5 K/UL (ref 4.8–10.8)
WBC # BLD: 6.6 K/UL (ref 4.8–10.8)
WBC # BLD: 7 K/UL (ref 4.8–10.8)
WBC # BLD: 7.4 K/UL (ref 4.8–10.8)
WBC # BLD: 7.7 K/UL (ref 4.8–10.8)
WBC # BLD: 8.2 K/UL (ref 4.8–10.8)
WBC UA: ABNORMAL /HPF (ref 0–5)
WBC UA: ABNORMAL /HPF (ref 0–5)
WBC UA: NORMAL /HPF (ref 0–5)

## 2017-01-01 PROCEDURE — 92507 TX SP LANG VOICE COMM INDIV: CPT

## 2017-01-01 PROCEDURE — 1180000000 HC REHAB R&B

## 2017-01-01 PROCEDURE — 2580000003 HC RX 258: Performed by: PHYSICAL MEDICINE & REHABILITATION

## 2017-01-01 PROCEDURE — 97116 GAIT TRAINING THERAPY: CPT | Performed by: INTERNAL MEDICINE

## 2017-01-01 PROCEDURE — 97542 WHEELCHAIR MNGMENT TRAINING: CPT | Performed by: INTERNAL MEDICINE

## 2017-01-01 PROCEDURE — C1725 CATH, TRANSLUMIN NON-LASER: HCPCS

## 2017-01-01 PROCEDURE — 86900 BLOOD TYPING SEROLOGIC ABO: CPT

## 2017-01-01 PROCEDURE — 6370000000 HC RX 637 (ALT 250 FOR IP): Performed by: INTERNAL MEDICINE

## 2017-01-01 PROCEDURE — 99233 SBSQ HOSP IP/OBS HIGH 50: CPT | Performed by: PHYSICAL MEDICINE & REHABILITATION

## 2017-01-01 PROCEDURE — 2580000003 HC RX 258: Performed by: NURSE ANESTHETIST, CERTIFIED REGISTERED

## 2017-01-01 PROCEDURE — 1123F ACP DISCUSS/DSCN MKR DOCD: CPT | Performed by: NURSE PRACTITIONER

## 2017-01-01 PROCEDURE — 85025 COMPLETE CBC W/AUTO DIFF WBC: CPT

## 2017-01-01 PROCEDURE — 6370000000 HC RX 637 (ALT 250 FOR IP): Performed by: PHYSICAL MEDICINE & REHABILITATION

## 2017-01-01 PROCEDURE — 37228 HC TIB PER TERRITORY PLASTY: CPT | Performed by: INTERNAL MEDICINE

## 2017-01-01 PROCEDURE — 99232 SBSQ HOSP IP/OBS MODERATE 35: CPT | Performed by: PHYSICAL MEDICINE & REHABILITATION

## 2017-01-01 PROCEDURE — 97535 SELF CARE MNGMENT TRAINING: CPT

## 2017-01-01 PROCEDURE — 7100000000 HC PACU RECOVERY - FIRST 15 MIN: Performed by: SURGERY

## 2017-01-01 PROCEDURE — 37226 PR REVSC OPN/PRQ FEM/POP W/STNT/ANGIOP SM VSL: CPT | Performed by: INTERNAL MEDICINE

## 2017-01-01 PROCEDURE — 2580000003 HC RX 258: Performed by: INTERNAL MEDICINE

## 2017-01-01 PROCEDURE — 80048 BASIC METABOLIC PNL TOTAL CA: CPT

## 2017-01-01 PROCEDURE — 97535 SELF CARE MNGMENT TRAINING: CPT | Performed by: INTERNAL MEDICINE

## 2017-01-01 PROCEDURE — 99306 1ST NF CARE HIGH MDM 50: CPT | Performed by: INTERNAL MEDICINE

## 2017-01-01 PROCEDURE — 84132 ASSAY OF SERUM POTASSIUM: CPT

## 2017-01-01 PROCEDURE — G8427 DOCREV CUR MEDS BY ELIG CLIN: HCPCS | Performed by: INTERNAL MEDICINE

## 2017-01-01 PROCEDURE — 83880 ASSAY OF NATRIURETIC PEPTIDE: CPT

## 2017-01-01 PROCEDURE — 36415 COLL VENOUS BLD VENIPUNCTURE: CPT

## 2017-01-01 PROCEDURE — 6360000002 HC RX W HCPCS: Performed by: PHYSICAL MEDICINE & REHABILITATION

## 2017-01-01 PROCEDURE — S0028 INJECTION, FAMOTIDINE, 20 MG: HCPCS | Performed by: PHYSICAL MEDICINE & REHABILITATION

## 2017-01-01 PROCEDURE — 75716 ARTERY X-RAYS ARMS/LEGS: CPT | Performed by: INTERNAL MEDICINE

## 2017-01-01 PROCEDURE — 97530 THERAPEUTIC ACTIVITIES: CPT

## 2017-01-01 PROCEDURE — 1123F ACP DISCUSS/DSCN MKR DOCD: CPT | Performed by: INTERNAL MEDICINE

## 2017-01-01 PROCEDURE — 92526 ORAL FUNCTION THERAPY: CPT

## 2017-01-01 PROCEDURE — 82550 ASSAY OF CK (CPK): CPT

## 2017-01-01 PROCEDURE — 99285 EMERGENCY DEPT VISIT HI MDM: CPT

## 2017-01-01 PROCEDURE — 97110 THERAPEUTIC EXERCISES: CPT | Performed by: INTERNAL MEDICINE

## 2017-01-01 PROCEDURE — 99217 PR OBSERVATION CARE DISCHARGE MANAGEMENT: CPT | Performed by: INTERNAL MEDICINE

## 2017-01-01 PROCEDURE — 2500000003 HC RX 250 WO HCPCS: Performed by: PHYSICAL MEDICINE & REHABILITATION

## 2017-01-01 PROCEDURE — 2580000003 HC RX 258

## 2017-01-01 PROCEDURE — 1111F DSCHRG MED/CURRENT MED MERGE: CPT | Performed by: INTERNAL MEDICINE

## 2017-01-01 PROCEDURE — 2500000003 HC RX 250 WO HCPCS: Performed by: SURGERY

## 2017-01-01 PROCEDURE — 6360000002 HC RX W HCPCS: Performed by: STUDENT IN AN ORGANIZED HEALTH CARE EDUCATION/TRAINING PROGRAM

## 2017-01-01 PROCEDURE — 97112 NEUROMUSCULAR REEDUCATION: CPT

## 2017-01-01 PROCEDURE — 99223 1ST HOSP IP/OBS HIGH 75: CPT | Performed by: PHYSICAL MEDICINE & REHABILITATION

## 2017-01-01 PROCEDURE — 82948 REAGENT STRIP/BLOOD GLUCOSE: CPT

## 2017-01-01 PROCEDURE — 99304 1ST NF CARE SF/LOW MDM 25: CPT | Performed by: INTERNAL MEDICINE

## 2017-01-01 PROCEDURE — G8484 FLU IMMUNIZE NO ADMIN: HCPCS | Performed by: INTERNAL MEDICINE

## 2017-01-01 PROCEDURE — 99999 PR OFFICE/OUTPT VISIT,PROCEDURE ONLY: CPT | Performed by: INTERNAL MEDICINE

## 2017-01-01 PROCEDURE — 85027 COMPLETE CBC AUTOMATED: CPT

## 2017-01-01 PROCEDURE — G8420 CALC BMI NORM PARAMETERS: HCPCS | Performed by: INTERNAL MEDICINE

## 2017-01-01 PROCEDURE — 80053 COMPREHEN METABOLIC PANEL: CPT

## 2017-01-01 PROCEDURE — 76937 US GUIDE VASCULAR ACCESS: CPT

## 2017-01-01 PROCEDURE — 97167 OT EVAL HIGH COMPLEX 60 MIN: CPT

## 2017-01-01 PROCEDURE — 6360000002 HC RX W HCPCS: Performed by: NURSE ANESTHETIST, CERTIFIED REGISTERED

## 2017-01-01 PROCEDURE — 97110 THERAPEUTIC EXERCISES: CPT

## 2017-01-01 PROCEDURE — C9113 INJ PANTOPRAZOLE SODIUM, VIA: HCPCS | Performed by: PHYSICAL MEDICINE & REHABILITATION

## 2017-01-01 PROCEDURE — 2720000001 HC MISC SURG SUPPLY STERILE $51-500

## 2017-01-01 PROCEDURE — 97112 NEUROMUSCULAR REEDUCATION: CPT | Performed by: INTERNAL MEDICINE

## 2017-01-01 PROCEDURE — C1887 CATHETER, GUIDING: HCPCS

## 2017-01-01 PROCEDURE — 85730 THROMBOPLASTIN TIME PARTIAL: CPT

## 2017-01-01 PROCEDURE — 7100000001 HC PACU RECOVERY - ADDTL 15 MIN: Performed by: SURGERY

## 2017-01-01 PROCEDURE — 99221 1ST HOSP IP/OBS SF/LOW 40: CPT | Performed by: INTERNAL MEDICINE

## 2017-01-01 PROCEDURE — 3700000001 HC ADD 15 MINUTES (ANESTHESIA)

## 2017-01-01 PROCEDURE — 3700000000 HC ANESTHESIA ATTENDED CARE: Performed by: SURGERY

## 2017-01-01 PROCEDURE — 74230 X-RAY XM SWLNG FUNCJ C+: CPT

## 2017-01-01 PROCEDURE — 6370000000 HC RX 637 (ALT 250 FOR IP): Performed by: NURSE PRACTITIONER

## 2017-01-01 PROCEDURE — G8598 ASA/ANTIPLAT THER USED: HCPCS | Performed by: INTERNAL MEDICINE

## 2017-01-01 PROCEDURE — C9132 KCENTRA, PER I.U.: HCPCS | Performed by: STUDENT IN AN ORGANIZED HEALTH CARE EDUCATION/TRAINING PROGRAM

## 2017-01-01 PROCEDURE — 92523 SPEECH SOUND LANG COMPREHEN: CPT

## 2017-01-01 PROCEDURE — 85379 FIBRIN DEGRADATION QUANT: CPT

## 2017-01-01 PROCEDURE — 97163 PT EVAL HIGH COMPLEX 45 MIN: CPT

## 2017-01-01 PROCEDURE — 71010 XR CHEST PORTABLE: CPT

## 2017-01-01 PROCEDURE — 3609017100 HC EGD: Performed by: SURGERY

## 2017-01-01 PROCEDURE — 84484 ASSAY OF TROPONIN QUANT: CPT

## 2017-01-01 PROCEDURE — 97542 WHEELCHAIR MNGMENT TRAINING: CPT

## 2017-01-01 PROCEDURE — 81003 URINALYSIS AUTO W/O SCOPE: CPT

## 2017-01-01 PROCEDURE — 1036F TOBACCO NON-USER: CPT | Performed by: INTERNAL MEDICINE

## 2017-01-01 PROCEDURE — 93000 ELECTROCARDIOGRAM COMPLETE: CPT | Performed by: INTERNAL MEDICINE

## 2017-01-01 PROCEDURE — 99308 SBSQ NF CARE LOW MDM 20: CPT | Performed by: NURSE PRACTITIONER

## 2017-01-01 PROCEDURE — 99309 SBSQ NF CARE MODERATE MDM 30: CPT | Performed by: NURSE PRACTITIONER

## 2017-01-01 PROCEDURE — 2500000003 HC RX 250 WO HCPCS: Performed by: RADIOLOGY

## 2017-01-01 PROCEDURE — 37226 HC FEM POP TERR PLASTY AND STENT: CPT | Performed by: INTERNAL MEDICINE

## 2017-01-01 PROCEDURE — 85610 PROTHROMBIN TIME: CPT

## 2017-01-01 PROCEDURE — 94664 DEMO&/EVAL PT USE INHALER: CPT

## 2017-01-01 PROCEDURE — 4040F PNEUMOC VAC/ADMIN/RCVD: CPT | Performed by: INTERNAL MEDICINE

## 2017-01-01 PROCEDURE — 93010 ELECTROCARDIOGRAM REPORT: CPT | Performed by: INTERNAL MEDICINE

## 2017-01-01 PROCEDURE — 70450 CT HEAD/BRAIN W/O DYE: CPT

## 2017-01-01 PROCEDURE — 97532 HC OT DEVELOP COGNITIVE SKILLS 15MIN: CPT

## 2017-01-01 PROCEDURE — 80061 LIPID PANEL: CPT

## 2017-01-01 PROCEDURE — C1769 GUIDE WIRE: HCPCS

## 2017-01-01 PROCEDURE — 3700000000 HC ANESTHESIA ATTENDED CARE

## 2017-01-01 PROCEDURE — 3609018000 HC EGD ESOPHAGOGASTRODUODENOSCOPY PEG TUBE INSERTION: Performed by: SURGERY

## 2017-01-01 PROCEDURE — 97140 MANUAL THERAPY 1/> REGIONS: CPT

## 2017-01-01 PROCEDURE — 99231 SBSQ HOSP IP/OBS SF/LOW 25: CPT | Performed by: PHYSICAL MEDICINE & REHABILITATION

## 2017-01-01 PROCEDURE — 6360000002 HC RX W HCPCS

## 2017-01-01 PROCEDURE — 82553 CREATINE MB FRACTION: CPT

## 2017-01-01 PROCEDURE — 82140 ASSAY OF AMMONIA: CPT

## 2017-01-01 PROCEDURE — 93005 ELECTROCARDIOGRAM TRACING: CPT

## 2017-01-01 PROCEDURE — 97532 HC COGNITIVE THERAPY 15 MIN: CPT

## 2017-01-01 PROCEDURE — 74176 CT ABD & PELVIS W/O CONTRAST: CPT

## 2017-01-01 PROCEDURE — 85347 COAGULATION TIME ACTIVATED: CPT

## 2017-01-01 PROCEDURE — 3700000001 HC ADD 15 MINUTES (ANESTHESIA): Performed by: SURGERY

## 2017-01-01 PROCEDURE — 37232 PR REVSC OPN/PRQ TIB/PERO W/ANGIOPLASTY UNI EA VSL: CPT | Performed by: INTERNAL MEDICINE

## 2017-01-01 PROCEDURE — 2700000000 HC OXYGEN THERAPY PER DAY

## 2017-01-01 PROCEDURE — 86901 BLOOD TYPING SEROLOGIC RH(D): CPT

## 2017-01-01 PROCEDURE — 99238 HOSP IP/OBS DSCHRG MGMT 30/<: CPT | Performed by: PHYSICAL MEDICINE & REHABILITATION

## 2017-01-01 PROCEDURE — 92610 EVALUATE SWALLOWING FUNCTION: CPT

## 2017-01-01 PROCEDURE — 2500000003 HC RX 250 WO HCPCS: Performed by: NURSE ANESTHETIST, CERTIFIED REGISTERED

## 2017-01-01 PROCEDURE — 97150 GROUP THERAPEUTIC PROCEDURES: CPT

## 2017-01-01 PROCEDURE — 83735 ASSAY OF MAGNESIUM: CPT

## 2017-01-01 PROCEDURE — 92611 MOTION FLUOROSCOPY/SWALLOW: CPT

## 2017-01-01 PROCEDURE — 86850 RBC ANTIBODY SCREEN: CPT

## 2017-01-01 PROCEDURE — 99213 OFFICE O/P EST LOW 20 MIN: CPT | Performed by: INTERNAL MEDICINE

## 2017-01-01 PROCEDURE — 37228 PR REVSC OPN/PRQ TIB/PERO W/ANGIOPLASTY UNI: CPT | Performed by: INTERNAL MEDICINE

## 2017-01-01 PROCEDURE — 72125 CT NECK SPINE W/O DYE: CPT

## 2017-01-01 PROCEDURE — 99231 SBSQ HOSP IP/OBS SF/LOW 25: CPT | Performed by: INTERNAL MEDICINE

## 2017-01-01 PROCEDURE — 99204 OFFICE O/P NEW MOD 45 MIN: CPT | Performed by: INTERNAL MEDICINE

## 2017-01-01 PROCEDURE — 6370000000 HC RX 637 (ALT 250 FOR IP): Performed by: EMERGENCY MEDICINE

## 2017-01-01 PROCEDURE — 81001 URINALYSIS AUTO W/SCOPE: CPT

## 2017-01-01 PROCEDURE — 96374 THER/PROPH/DIAG INJ IV PUSH: CPT

## 2017-01-01 PROCEDURE — 2580000003 HC RX 258: Performed by: SURGERY

## 2017-01-01 PROCEDURE — 2500000003 HC RX 250 WO HCPCS

## 2017-01-01 PROCEDURE — C1894 INTRO/SHEATH, NON-LASER: HCPCS

## 2017-01-01 PROCEDURE — 86141 C-REACTIVE PROTEIN HS: CPT

## 2017-01-01 PROCEDURE — G0480 DRUG TEST DEF 1-7 CLASSES: HCPCS

## 2017-01-01 RX ORDER — ONDANSETRON 2 MG/ML
4 INJECTION INTRAMUSCULAR; INTRAVENOUS
Status: DISCONTINUED | OUTPATIENT
Start: 2017-01-01 | End: 2017-01-01 | Stop reason: HOSPADM

## 2017-01-01 RX ORDER — SODIUM CHLORIDE 9 MG/ML
INJECTION, SOLUTION INTRAVENOUS CONTINUOUS
Status: DISCONTINUED | OUTPATIENT
Start: 2017-01-01 | End: 2017-01-01

## 2017-01-01 RX ORDER — GUAIFENESIN 100 MG/5ML
10 SOLUTION ORAL EVERY 4 HOURS PRN
Status: DISCONTINUED | OUTPATIENT
Start: 2017-01-01 | End: 2017-01-01 | Stop reason: HOSPADM

## 2017-01-01 RX ORDER — DOXAZOSIN MESYLATE 1 MG/1
1 TABLET ORAL NIGHTLY
Qty: 30 TABLET | Refills: 3
Start: 2017-01-01 | End: 2017-01-01 | Stop reason: SDUPTHER

## 2017-01-01 RX ORDER — LISINOPRIL 2.5 MG/1
2.5 TABLET ORAL DAILY
Qty: 30 TABLET | Refills: 0
Start: 2017-01-01 | End: 2017-01-01 | Stop reason: SDUPTHER

## 2017-01-01 RX ORDER — LISINOPRIL AND HYDROCHLOROTHIAZIDE 25; 20 MG/1; MG/1
1 TABLET ORAL DAILY
Qty: 30 TABLET | Refills: 3 | Status: SHIPPED | OUTPATIENT
Start: 2017-01-01 | End: 2017-01-01 | Stop reason: SDUPTHER

## 2017-01-01 RX ORDER — DOXAZOSIN MESYLATE 1 MG/1
1 TABLET ORAL NIGHTLY
Status: DISCONTINUED | OUTPATIENT
Start: 2017-01-01 | End: 2017-01-01 | Stop reason: HOSPADM

## 2017-01-01 RX ORDER — PANTOPRAZOLE SODIUM 40 MG/1
40 TABLET, DELAYED RELEASE ORAL DAILY
Status: DISCONTINUED | OUTPATIENT
Start: 2017-01-01 | End: 2017-01-01

## 2017-01-01 RX ORDER — ASPIRIN 325 MG
325 TABLET ORAL ONCE
Status: DISCONTINUED | OUTPATIENT
Start: 2017-01-01 | End: 2017-01-01

## 2017-01-01 RX ORDER — DEXTROSE MONOHYDRATE 25 G/50ML
12.5 INJECTION, SOLUTION INTRAVENOUS PRN
Status: DISCONTINUED | OUTPATIENT
Start: 2017-01-01 | End: 2017-01-01 | Stop reason: HOSPADM

## 2017-01-01 RX ORDER — 0.9 % SODIUM CHLORIDE 0.9 %
10 VIAL (ML) INJECTION DAILY
Status: CANCELLED | OUTPATIENT
Start: 2017-01-01

## 2017-01-01 RX ORDER — ATORVASTATIN CALCIUM 10 MG/1
10 TABLET, FILM COATED ORAL NIGHTLY
Status: DISCONTINUED | OUTPATIENT
Start: 2017-01-01 | End: 2017-01-01

## 2017-01-01 RX ORDER — ATORVASTATIN CALCIUM 20 MG/1
20 TABLET, FILM COATED ORAL NIGHTLY
Status: DISCONTINUED | OUTPATIENT
Start: 2017-01-01 | End: 2017-01-01

## 2017-01-01 RX ORDER — ATORVASTATIN CALCIUM 10 MG/1
10 TABLET, FILM COATED ORAL NIGHTLY
Status: DISCONTINUED | OUTPATIENT
Start: 2017-01-01 | End: 2017-01-01 | Stop reason: HOSPADM

## 2017-01-01 RX ORDER — LISINOPRIL 20 MG/1
40 TABLET ORAL EVERY MORNING
Status: DISCONTINUED | OUTPATIENT
Start: 2017-01-01 | End: 2017-01-01

## 2017-01-01 RX ORDER — NITROFURANTOIN 25; 75 MG/1; MG/1
100 CAPSULE ORAL EVERY 12 HOURS SCHEDULED
Status: DISCONTINUED | OUTPATIENT
Start: 2017-01-01 | End: 2017-01-01 | Stop reason: HOSPADM

## 2017-01-01 RX ORDER — LABETALOL HYDROCHLORIDE 5 MG/ML
10 INJECTION, SOLUTION INTRAVENOUS EVERY 30 MIN PRN
Status: DISCONTINUED | OUTPATIENT
Start: 2017-01-01 | End: 2017-01-01 | Stop reason: SDUPTHER

## 2017-01-01 RX ORDER — TAMSULOSIN HYDROCHLORIDE 0.4 MG/1
0.4 CAPSULE ORAL NIGHTLY
Qty: 30 CAPSULE | Refills: 3 | Status: SHIPPED | OUTPATIENT
Start: 2017-01-01 | End: 2017-01-01 | Stop reason: SDUPTHER

## 2017-01-01 RX ORDER — NITROFURANTOIN 25; 75 MG/1; MG/1
100 CAPSULE ORAL EVERY 12 HOURS SCHEDULED
Status: CANCELLED | OUTPATIENT
Start: 2017-01-01

## 2017-01-01 RX ORDER — 0.9 % SODIUM CHLORIDE 0.9 %
10 VIAL (ML) INJECTION DAILY
Status: DISCONTINUED | OUTPATIENT
Start: 2017-01-01 | End: 2017-01-01 | Stop reason: ALTCHOICE

## 2017-01-01 RX ORDER — SODIUM CHLORIDE 0.9 % (FLUSH) 0.9 %
10 SYRINGE (ML) INJECTION EVERY 12 HOURS SCHEDULED
Status: DISCONTINUED | OUTPATIENT
Start: 2017-01-01 | End: 2017-01-01

## 2017-01-01 RX ORDER — ACETAMINOPHEN 325 MG/1
650 TABLET ORAL EVERY 4 HOURS PRN
Status: CANCELLED | OUTPATIENT
Start: 2017-01-01

## 2017-01-01 RX ORDER — SODIUM PHOSPHATE, DIBASIC AND SODIUM PHOSPHATE, MONOBASIC 7; 19 G/133ML; G/133ML
1 ENEMA RECTAL PRN
Status: CANCELLED | OUTPATIENT
Start: 2017-01-01

## 2017-01-01 RX ORDER — SULFAMETHOXAZOLE AND TRIMETHOPRIM 800; 160 MG/1; MG/1
1 TABLET ORAL 2 TIMES DAILY
Qty: 20 TABLET | Refills: 0 | Status: SHIPPED | OUTPATIENT
Start: 2017-01-01 | End: 2018-01-01

## 2017-01-01 RX ORDER — PROPOFOL 10 MG/ML
INJECTION, EMULSION INTRAVENOUS PRN
Status: DISCONTINUED | OUTPATIENT
Start: 2017-01-01 | End: 2017-01-01 | Stop reason: SDUPTHER

## 2017-01-01 RX ORDER — TAMSULOSIN HYDROCHLORIDE 0.4 MG/1
0.4 CAPSULE ORAL NIGHTLY
Status: CANCELLED | OUTPATIENT
Start: 2017-01-01

## 2017-01-01 RX ORDER — ALBUTEROL SULFATE 2.5 MG/3ML
2.5 SOLUTION RESPIRATORY (INHALATION) EVERY 4 HOURS PRN
COMMUNITY
End: 2017-01-01 | Stop reason: SDUPTHER

## 2017-01-01 RX ORDER — SODIUM CHLORIDE 450 MG/100ML
INJECTION, SOLUTION INTRAVENOUS CONTINUOUS
Status: DISCONTINUED | OUTPATIENT
Start: 2017-01-01 | End: 2017-01-01

## 2017-01-01 RX ORDER — LISINOPRIL AND HYDROCHLOROTHIAZIDE 25; 20 MG/1; MG/1
1 TABLET ORAL DAILY
COMMUNITY
End: 2017-01-01 | Stop reason: ALTCHOICE

## 2017-01-01 RX ORDER — MAGNESIUM OXIDE 400 MG/1
400 TABLET ORAL DAILY
Status: ON HOLD | COMMUNITY
End: 2018-01-01 | Stop reason: HOSPADM

## 2017-01-01 RX ORDER — DEXTROSE MONOHYDRATE 50 MG/ML
100 INJECTION, SOLUTION INTRAVENOUS PRN
Status: DISCONTINUED | OUTPATIENT
Start: 2017-01-01 | End: 2017-01-01 | Stop reason: HOSPADM

## 2017-01-01 RX ORDER — CHLORAL HYDRATE 500 MG
1000 CAPSULE ORAL
Status: DISCONTINUED | OUTPATIENT
Start: 2017-01-01 | End: 2017-01-01

## 2017-01-01 RX ORDER — ESCITALOPRAM OXALATE 10 MG/1
5 TABLET ORAL DAILY
Status: DISCONTINUED | OUTPATIENT
Start: 2017-01-01 | End: 2017-01-01

## 2017-01-01 RX ORDER — ONDANSETRON 2 MG/ML
INJECTION INTRAMUSCULAR; INTRAVENOUS PRN
Status: DISCONTINUED | OUTPATIENT
Start: 2017-01-01 | End: 2017-01-01 | Stop reason: SDUPTHER

## 2017-01-01 RX ORDER — TAMSULOSIN HYDROCHLORIDE 0.4 MG/1
0.4 CAPSULE ORAL NIGHTLY
Status: DISCONTINUED | OUTPATIENT
Start: 2017-01-01 | End: 2017-01-01 | Stop reason: HOSPADM

## 2017-01-01 RX ORDER — ATORVASTATIN CALCIUM 10 MG/1
10 TABLET, FILM COATED ORAL DAILY
COMMUNITY
End: 2018-01-01

## 2017-01-01 RX ORDER — CITALOPRAM 10 MG/1
10 TABLET ORAL DAILY
COMMUNITY
End: 2017-01-01 | Stop reason: SDUPTHER

## 2017-01-01 RX ORDER — SODIUM POLYSTYRENE SULFONATE 15 G/60ML
15 SUSPENSION ORAL; RECTAL ONCE
Status: COMPLETED | OUTPATIENT
Start: 2017-01-01 | End: 2017-01-01

## 2017-01-01 RX ORDER — CINNAMON
1 OIL (ML) MISCELLANEOUS 4 TIMES DAILY
Status: DISCONTINUED | OUTPATIENT
Start: 2017-01-01 | End: 2017-01-01 | Stop reason: HOSPADM

## 2017-01-01 RX ORDER — UREA 10 %
2 LOTION (ML) TOPICAL EVERY MORNING
Status: ON HOLD | COMMUNITY
End: 2018-01-01 | Stop reason: HOSPADM

## 2017-01-01 RX ORDER — METHADONE HYDROCHLORIDE 5 MG/1
5 TABLET ORAL 2 TIMES DAILY
Status: DISCONTINUED | OUTPATIENT
Start: 2017-01-01 | End: 2017-01-01 | Stop reason: HOSPADM

## 2017-01-01 RX ORDER — ATORVASTATIN CALCIUM 10 MG/1
10 TABLET, FILM COATED ORAL NIGHTLY
Status: CANCELLED | OUTPATIENT
Start: 2017-01-01

## 2017-01-01 RX ORDER — METHADONE HYDROCHLORIDE 5 MG/1
5 TABLET ORAL 2 TIMES DAILY
Qty: 60 TABLET | Refills: 0 | Status: SHIPPED | OUTPATIENT
Start: 2017-01-01 | End: 2017-01-01 | Stop reason: SDUPTHER

## 2017-01-01 RX ORDER — ALBUTEROL SULFATE 2.5 MG/3ML
2.5 SOLUTION RESPIRATORY (INHALATION) EVERY 4 HOURS PRN
COMMUNITY

## 2017-01-01 RX ORDER — L. ACIDOPHILUS/L.BULGARICUS 1MM CELL
2 TABLET ORAL DAILY
Status: DISCONTINUED | OUTPATIENT
Start: 2017-01-01 | End: 2017-01-01 | Stop reason: HOSPADM

## 2017-01-01 RX ORDER — METHADONE HYDROCHLORIDE 10 MG/1
5 TABLET ORAL 2 TIMES DAILY
Status: DISCONTINUED | OUTPATIENT
Start: 2017-01-01 | End: 2017-01-01

## 2017-01-01 RX ORDER — CITALOPRAM 10 MG/1
10 TABLET ORAL DAILY
Status: DISCONTINUED | OUTPATIENT
Start: 2017-01-01 | End: 2017-01-01 | Stop reason: HOSPADM

## 2017-01-01 RX ORDER — ASPIRIN 81 MG/1
81 TABLET, CHEWABLE ORAL DAILY
Status: DISCONTINUED | OUTPATIENT
Start: 2017-01-01 | End: 2017-01-01 | Stop reason: HOSPADM

## 2017-01-01 RX ORDER — HYDROCODONE BITARTRATE AND ACETAMINOPHEN 5; 325 MG/1; MG/1
1 TABLET ORAL PRN
Status: DISCONTINUED | OUTPATIENT
Start: 2017-01-01 | End: 2017-01-01

## 2017-01-01 RX ORDER — METHADONE HYDROCHLORIDE 10 MG/1
5 TABLET ORAL 2 TIMES DAILY
Status: CANCELLED | OUTPATIENT
Start: 2017-01-01

## 2017-01-01 RX ORDER — UBIDECARENONE 100 MG
100 CAPSULE ORAL 2 TIMES DAILY
Status: DISCONTINUED | OUTPATIENT
Start: 2017-01-01 | End: 2017-01-01 | Stop reason: HOSPADM

## 2017-01-01 RX ORDER — CINNAMON
1 OIL (ML) MISCELLANEOUS 4 TIMES DAILY
Status: CANCELLED | OUTPATIENT
Start: 2017-01-01

## 2017-01-01 RX ORDER — AMOXICILLIN 500 MG
1 CAPSULE ORAL DAILY
COMMUNITY
End: 2017-01-01 | Stop reason: SDUPTHER

## 2017-01-01 RX ORDER — ALBUTEROL SULFATE 2.5 MG/3ML
2.5 SOLUTION RESPIRATORY (INHALATION) EVERY 4 HOURS PRN
Status: DISCONTINUED | OUTPATIENT
Start: 2017-01-01 | End: 2017-01-01 | Stop reason: HOSPADM

## 2017-01-01 RX ORDER — MAGNESIUM HYDROXIDE/ALUMINUM HYDROXICE/SIMETHICONE 120; 1200; 1200 MG/30ML; MG/30ML; MG/30ML
30 SUSPENSION ORAL EVERY 4 HOURS PRN
Status: DISCONTINUED | OUTPATIENT
Start: 2017-01-01 | End: 2017-01-01 | Stop reason: HOSPADM

## 2017-01-01 RX ORDER — METOPROLOL TARTRATE 50 MG/1
50 TABLET, FILM COATED ORAL 2 TIMES DAILY
Status: DISCONTINUED | OUTPATIENT
Start: 2017-01-01 | End: 2017-01-01 | Stop reason: DRUGHIGH

## 2017-01-01 RX ORDER — METHADONE HYDROCHLORIDE 10 MG/1
5 TABLET ORAL 2 TIMES DAILY
Status: DISCONTINUED | OUTPATIENT
Start: 2017-01-01 | End: 2017-01-01 | Stop reason: HOSPADM

## 2017-01-01 RX ORDER — SODIUM PHOSPHATE, DIBASIC AND SODIUM PHOSPHATE, MONOBASIC 7; 19 G/133ML; G/133ML
1 ENEMA RECTAL PRN
Status: DISCONTINUED | OUTPATIENT
Start: 2017-01-01 | End: 2017-01-01 | Stop reason: HOSPADM

## 2017-01-01 RX ORDER — ACETAMINOPHEN 325 MG/1
650 TABLET ORAL EVERY 4 HOURS PRN
Status: DISCONTINUED | OUTPATIENT
Start: 2017-01-01 | End: 2017-01-01 | Stop reason: HOSPADM

## 2017-01-01 RX ORDER — PANTOPRAZOLE SODIUM 40 MG/1
40 TABLET, DELAYED RELEASE ORAL DAILY
COMMUNITY
End: 2017-01-01

## 2017-01-01 RX ORDER — ACETAMINOPHEN 650 MG/1
650 SUPPOSITORY RECTAL EVERY 4 HOURS PRN
Status: DISCONTINUED | OUTPATIENT
Start: 2017-01-01 | End: 2017-01-01 | Stop reason: HOSPADM

## 2017-01-01 RX ORDER — DOXAZOSIN MESYLATE 1 MG/1
1 TABLET ORAL NIGHTLY
Status: ON HOLD | COMMUNITY
End: 2018-01-01 | Stop reason: HOSPADM

## 2017-01-01 RX ORDER — MEPERIDINE HYDROCHLORIDE 25 MG/ML
12.5 INJECTION INTRAMUSCULAR; INTRAVENOUS; SUBCUTANEOUS EVERY 5 MIN PRN
Status: DISCONTINUED | OUTPATIENT
Start: 2017-01-01 | End: 2017-01-01

## 2017-01-01 RX ORDER — PANTOPRAZOLE SODIUM 40 MG/10ML
40 INJECTION, POWDER, LYOPHILIZED, FOR SOLUTION INTRAVENOUS DAILY
Status: DISCONTINUED | OUTPATIENT
Start: 2017-01-01 | End: 2017-01-01 | Stop reason: ALTCHOICE

## 2017-01-01 RX ORDER — SULFAMETHOXAZOLE AND TRIMETHOPRIM 800; 160 MG/1; MG/1
1 TABLET ORAL ONCE
Status: COMPLETED | OUTPATIENT
Start: 2017-01-01 | End: 2017-01-01

## 2017-01-01 RX ORDER — ASPIRIN 81 MG/1
81 TABLET, CHEWABLE ORAL ONCE
Status: COMPLETED | OUTPATIENT
Start: 2017-01-01 | End: 2017-01-01

## 2017-01-01 RX ORDER — DIAZEPAM 5 MG/1
5 TABLET ORAL
Status: DISCONTINUED | OUTPATIENT
Start: 2017-01-01 | End: 2017-01-01

## 2017-01-01 RX ORDER — KETAMINE HYDROCHLORIDE 100 MG/ML
INJECTION, SOLUTION INTRAMUSCULAR; INTRAVENOUS PRN
Status: DISCONTINUED | OUTPATIENT
Start: 2017-01-01 | End: 2017-01-01 | Stop reason: SDUPTHER

## 2017-01-01 RX ORDER — SEVOFLURANE 250 ML/250ML
LIQUID RESPIRATORY (INHALATION) CONTINUOUS PRN
Status: DISCONTINUED | OUTPATIENT
Start: 2017-01-01 | End: 2017-01-01 | Stop reason: SDUPTHER

## 2017-01-01 RX ORDER — EPHEDRINE SULFATE 50 MG/ML
INJECTION, SOLUTION INTRAVENOUS PRN
Status: DISCONTINUED | OUTPATIENT
Start: 2017-01-01 | End: 2017-01-01 | Stop reason: SDUPTHER

## 2017-01-01 RX ORDER — ACETAMINOPHEN 325 MG/1
650 TABLET ORAL EVERY 4 HOURS PRN
Status: DISCONTINUED | OUTPATIENT
Start: 2017-01-01 | End: 2017-01-01

## 2017-01-01 RX ORDER — TAMSULOSIN HYDROCHLORIDE 0.4 MG/1
0.4 CAPSULE ORAL DAILY
COMMUNITY
End: 2017-01-01 | Stop reason: ALTCHOICE

## 2017-01-01 RX ORDER — CLOPIDOGREL BISULFATE 75 MG/1
75 TABLET ORAL DAILY
Status: ON HOLD | COMMUNITY
End: 2018-01-01 | Stop reason: HOSPADM

## 2017-01-01 RX ORDER — LISINOPRIL 2.5 MG/1
2.5 TABLET ORAL DAILY
Status: DISCONTINUED | OUTPATIENT
Start: 2017-01-01 | End: 2017-01-01 | Stop reason: HOSPADM

## 2017-01-01 RX ORDER — ACETAMINOPHEN 325 MG/1
650 TABLET ORAL EVERY 4 HOURS PRN
Status: DISCONTINUED | OUTPATIENT
Start: 2017-01-01 | End: 2017-01-01 | Stop reason: SDUPTHER

## 2017-01-01 RX ORDER — PANTOPRAZOLE SODIUM 40 MG/1
40 GRANULE, DELAYED RELEASE ORAL
Status: DISCONTINUED | OUTPATIENT
Start: 2017-01-01 | End: 2017-01-01

## 2017-01-01 RX ORDER — NICOTINE POLACRILEX 4 MG
15 LOZENGE BUCCAL PRN
Status: DISCONTINUED | OUTPATIENT
Start: 2017-01-01 | End: 2017-01-01 | Stop reason: HOSPADM

## 2017-01-01 RX ORDER — LIDOCAINE HYDROCHLORIDE 20 MG/ML
INJECTION, SOLUTION INFILTRATION; PERINEURAL PRN
Status: DISCONTINUED | OUTPATIENT
Start: 2017-01-01 | End: 2017-01-01 | Stop reason: SDUPTHER

## 2017-01-01 RX ORDER — PANTOPRAZOLE SODIUM 40 MG/10ML
40 INJECTION, POWDER, LYOPHILIZED, FOR SOLUTION INTRAVENOUS DAILY
Status: CANCELLED | OUTPATIENT
Start: 2017-01-01

## 2017-01-01 RX ORDER — METOPROLOL TARTRATE 50 MG/1
50 TABLET, FILM COATED ORAL 2 TIMES DAILY
Status: ON HOLD | COMMUNITY
End: 2018-01-01 | Stop reason: HOSPADM

## 2017-01-01 RX ORDER — DEXTROSE MONOHYDRATE 50 MG/ML
100 INJECTION, SOLUTION INTRAVENOUS PRN
Status: CANCELLED | OUTPATIENT
Start: 2017-01-01

## 2017-01-01 RX ORDER — FERROUS SULFATE 325(65) MG
325 TABLET ORAL
Status: ON HOLD | COMMUNITY
End: 2018-01-01 | Stop reason: HOSPADM

## 2017-01-01 RX ORDER — CHLORAL HYDRATE 500 MG
1000 CAPSULE ORAL
Status: DISCONTINUED | OUTPATIENT
Start: 2017-01-01 | End: 2017-01-01 | Stop reason: HOSPADM

## 2017-01-01 RX ORDER — CALCIUM CARBONATE 200(500)MG
500 TABLET,CHEWABLE ORAL 2 TIMES DAILY
Status: DISCONTINUED | OUTPATIENT
Start: 2017-01-01 | End: 2017-01-01 | Stop reason: HOSPADM

## 2017-01-01 RX ORDER — HYDRALAZINE HYDROCHLORIDE 20 MG/ML
10 INJECTION INTRAMUSCULAR; INTRAVENOUS EVERY 10 MIN PRN
Status: DISCONTINUED | OUTPATIENT
Start: 2017-01-01 | End: 2017-01-01 | Stop reason: SDUPTHER

## 2017-01-01 RX ORDER — PANTOPRAZOLE SODIUM 40 MG/1
40 GRANULE, DELAYED RELEASE ORAL
Status: DISCONTINUED | OUTPATIENT
Start: 2017-01-01 | End: 2017-01-01 | Stop reason: HOSPADM

## 2017-01-01 RX ORDER — METOCLOPRAMIDE HYDROCHLORIDE 5 MG/ML
10 INJECTION INTRAMUSCULAR; INTRAVENOUS
Status: DISCONTINUED | OUTPATIENT
Start: 2017-01-01 | End: 2017-01-01

## 2017-01-01 RX ORDER — ATORVASTATIN CALCIUM 10 MG/1
10 TABLET, FILM COATED ORAL DAILY
Status: DISCONTINUED | OUTPATIENT
Start: 2017-01-01 | End: 2017-01-01 | Stop reason: HOSPADM

## 2017-01-01 RX ORDER — DEXTROSE MONOHYDRATE 25 G/50ML
12.5 INJECTION, SOLUTION INTRAVENOUS PRN
Status: CANCELLED | OUTPATIENT
Start: 2017-01-01

## 2017-01-01 RX ORDER — FAMOTIDINE 20 MG/1
20 TABLET, FILM COATED ORAL 2 TIMES DAILY
Status: ON HOLD | COMMUNITY
End: 2018-01-01 | Stop reason: ALTCHOICE

## 2017-01-01 RX ORDER — NICOTINE POLACRILEX 4 MG
15 LOZENGE BUCCAL PRN
Status: DISCONTINUED | OUTPATIENT
Start: 2017-01-01 | End: 2017-01-01

## 2017-01-01 RX ORDER — 0.9 % SODIUM CHLORIDE 0.9 %
10 VIAL (ML) INJECTION DAILY
Status: DISCONTINUED | OUTPATIENT
Start: 2017-01-01 | End: 2017-01-01 | Stop reason: HOSPADM

## 2017-01-01 RX ORDER — FERROUS SULFATE 325(65) MG
325 TABLET ORAL DAILY
Status: DISCONTINUED | OUTPATIENT
Start: 2017-01-01 | End: 2017-01-01 | Stop reason: HOSPADM

## 2017-01-01 RX ORDER — MAGNESIUM HYDROXIDE 1200 MG/15ML
LIQUID ORAL PRN
Status: DISCONTINUED | OUTPATIENT
Start: 2017-01-01 | End: 2017-01-01 | Stop reason: HOSPADM

## 2017-01-01 RX ORDER — CHLORAL HYDRATE 500 MG
1000 CAPSULE ORAL
Status: CANCELLED | OUTPATIENT
Start: 2017-01-01

## 2017-01-01 RX ORDER — CHOLECALCIFEROL (VITAMIN D3) 125 MCG
CAPSULE ORAL 2 TIMES DAILY
COMMUNITY
End: 2017-01-01 | Stop reason: SDUPTHER

## 2017-01-01 RX ORDER — METOPROLOL TARTRATE 5 MG/5ML
INJECTION INTRAVENOUS PRN
Status: DISCONTINUED | OUTPATIENT
Start: 2017-01-01 | End: 2017-01-01 | Stop reason: SDUPTHER

## 2017-01-01 RX ORDER — FAMOTIDINE 20 MG/1
20 TABLET, FILM COATED ORAL 2 TIMES DAILY
Status: DISCONTINUED | OUTPATIENT
Start: 2017-01-01 | End: 2017-01-01 | Stop reason: HOSPADM

## 2017-01-01 RX ORDER — METHADONE HYDROCHLORIDE 5 MG/1
5 TABLET ORAL 2 TIMES DAILY
COMMUNITY
End: 2017-01-01 | Stop reason: SDUPTHER

## 2017-01-01 RX ORDER — FENTANYL CITRATE 50 UG/ML
50 INJECTION, SOLUTION INTRAMUSCULAR; INTRAVENOUS EVERY 10 MIN PRN
Status: DISCONTINUED | OUTPATIENT
Start: 2017-01-01 | End: 2017-01-01

## 2017-01-01 RX ORDER — ONDANSETRON 2 MG/ML
4 INJECTION INTRAMUSCULAR; INTRAVENOUS
Status: DISCONTINUED | OUTPATIENT
Start: 2017-01-01 | End: 2017-01-01

## 2017-01-01 RX ORDER — FERROUS SULFATE 325(65) MG
325 TABLET ORAL
COMMUNITY
End: 2017-01-01 | Stop reason: SDUPTHER

## 2017-01-01 RX ORDER — METOPROLOL TARTRATE 50 MG/1
50 TABLET, FILM COATED ORAL 2 TIMES DAILY
Status: DISCONTINUED | OUTPATIENT
Start: 2017-01-01 | End: 2017-01-01

## 2017-01-01 RX ORDER — DOCUSATE SODIUM 100 MG/1
100 CAPSULE, LIQUID FILLED ORAL 2 TIMES DAILY
COMMUNITY
End: 2017-01-01 | Stop reason: SDUPTHER

## 2017-01-01 RX ORDER — ACETAMINOPHEN 325 MG/1
650 TABLET ORAL EVERY 4 HOURS PRN
Status: ON HOLD | COMMUNITY
End: 2018-01-01 | Stop reason: HOSPADM

## 2017-01-01 RX ORDER — CIPROFLOXACIN 2 MG/ML
200 INJECTION, SOLUTION INTRAVENOUS EVERY 12 HOURS
Status: DISCONTINUED | OUTPATIENT
Start: 2017-01-01 | End: 2017-01-01

## 2017-01-01 RX ORDER — METOPROLOL TARTRATE 50 MG/1
50 TABLET, FILM COATED ORAL 2 TIMES DAILY
Status: DISCONTINUED | OUTPATIENT
Start: 2017-01-01 | End: 2017-01-01 | Stop reason: HOSPADM

## 2017-01-01 RX ORDER — LISINOPRIL 2.5 MG/1
2.5 TABLET ORAL DAILY
Status: ON HOLD | COMMUNITY
End: 2018-01-01 | Stop reason: HOSPADM

## 2017-01-01 RX ORDER — FERROUS SULFATE 325(65) MG
325 TABLET ORAL
Status: DISCONTINUED | OUTPATIENT
Start: 2017-01-01 | End: 2017-01-01

## 2017-01-01 RX ORDER — MAGNESIUM HYDROXIDE/ALUMINUM HYDROXICE/SIMETHICONE 120; 1200; 1200 MG/30ML; MG/30ML; MG/30ML
30 SUSPENSION ORAL EVERY 4 HOURS PRN
Status: DISCONTINUED | OUTPATIENT
Start: 2017-01-01 | End: 2017-01-01

## 2017-01-01 RX ORDER — NICOTINE POLACRILEX 4 MG
15 LOZENGE BUCCAL PRN
Status: CANCELLED | OUTPATIENT
Start: 2017-01-01

## 2017-01-01 RX ORDER — BUPIVACAINE HYDROCHLORIDE AND EPINEPHRINE 2.5; 5 MG/ML; UG/ML
INJECTION, SOLUTION EPIDURAL; INFILTRATION; INTRACAUDAL; PERINEURAL PRN
Status: DISCONTINUED | OUTPATIENT
Start: 2017-01-01 | End: 2017-01-01 | Stop reason: HOSPADM

## 2017-01-01 RX ORDER — LABETALOL HYDROCHLORIDE 5 MG/ML
10 INJECTION, SOLUTION INTRAVENOUS EVERY 30 MIN PRN
Status: DISCONTINUED | OUTPATIENT
Start: 2017-01-01 | End: 2017-01-01 | Stop reason: HOSPADM

## 2017-01-01 RX ORDER — PANTOPRAZOLE SODIUM 40 MG/1
40 TABLET, DELAYED RELEASE ORAL DAILY
COMMUNITY
End: 2017-01-01 | Stop reason: SDUPTHER

## 2017-01-01 RX ORDER — DEXTROSE AND SODIUM CHLORIDE 5; .45 G/100ML; G/100ML
INJECTION, SOLUTION INTRAVENOUS CONTINUOUS
Status: DISCONTINUED | OUTPATIENT
Start: 2017-01-01 | End: 2017-01-01 | Stop reason: HOSPADM

## 2017-01-01 RX ORDER — TAMSULOSIN HYDROCHLORIDE 0.4 MG/1
0.4 CAPSULE ORAL DAILY
Status: DISCONTINUED | OUTPATIENT
Start: 2017-01-01 | End: 2017-01-01

## 2017-01-01 RX ORDER — CITALOPRAM 10 MG/1
10 TABLET ORAL DAILY
Status: DISCONTINUED | OUTPATIENT
Start: 2017-01-01 | End: 2017-01-01

## 2017-01-01 RX ORDER — CEFAZOLIN SODIUM 1 G/3ML
INJECTION, POWDER, FOR SOLUTION INTRAMUSCULAR; INTRAVENOUS PRN
Status: DISCONTINUED | OUTPATIENT
Start: 2017-01-01 | End: 2017-01-01 | Stop reason: SDUPTHER

## 2017-01-01 RX ORDER — FAMOTIDINE 20 MG/1
20 TABLET, FILM COATED ORAL 2 TIMES DAILY
COMMUNITY
End: 2017-01-01 | Stop reason: SDUPTHER

## 2017-01-01 RX ORDER — SODIUM CHLORIDE, SODIUM LACTATE, POTASSIUM CHLORIDE, CALCIUM CHLORIDE 600; 310; 30; 20 MG/100ML; MG/100ML; MG/100ML; MG/100ML
INJECTION, SOLUTION INTRAVENOUS CONTINUOUS PRN
Status: DISCONTINUED | OUTPATIENT
Start: 2017-01-01 | End: 2017-01-01 | Stop reason: SDUPTHER

## 2017-01-01 RX ORDER — FERROUS SULFATE 325(65) MG
325 TABLET ORAL DAILY
COMMUNITY
End: 2017-01-01 | Stop reason: SDUPTHER

## 2017-01-01 RX ORDER — NITROFURANTOIN 25; 75 MG/1; MG/1
100 CAPSULE ORAL EVERY 12 HOURS SCHEDULED
Status: DISCONTINUED | OUTPATIENT
Start: 2017-01-01 | End: 2017-01-01

## 2017-01-01 RX ORDER — CLOPIDOGREL BISULFATE 75 MG/1
75 TABLET ORAL ONCE
Status: COMPLETED | OUTPATIENT
Start: 2017-01-01 | End: 2017-01-01

## 2017-01-01 RX ORDER — PROPOFOL 10 MG/ML
INJECTION, EMULSION INTRAVENOUS CONTINUOUS PRN
Status: DISCONTINUED | OUTPATIENT
Start: 2017-01-01 | End: 2017-01-01 | Stop reason: SDUPTHER

## 2017-01-01 RX ORDER — ACETAMINOPHEN 325 MG/1
650 TABLET ORAL EVERY 4 HOURS PRN
COMMUNITY
End: 2017-01-01 | Stop reason: SDUPTHER

## 2017-01-01 RX ORDER — BISACODYL 10 MG
10 SUPPOSITORY, RECTAL RECTAL PRN
Status: DISCONTINUED | OUTPATIENT
Start: 2017-01-01 | End: 2017-01-01 | Stop reason: HOSPADM

## 2017-01-01 RX ORDER — CLOPIDOGREL BISULFATE 75 MG/1
75 TABLET ORAL DAILY
COMMUNITY
End: 2017-01-01 | Stop reason: SDUPTHER

## 2017-01-01 RX ORDER — FAMOTIDINE 20 MG/1
20 TABLET, FILM COATED ORAL 2 TIMES DAILY
Status: DISCONTINUED | OUTPATIENT
Start: 2017-01-01 | End: 2017-01-01 | Stop reason: ALTCHOICE

## 2017-01-01 RX ORDER — UBIDECARENONE 100 MG
100 CAPSULE ORAL 2 TIMES DAILY
Qty: 60 CAPSULE | Refills: 0 | Status: SHIPPED | OUTPATIENT
Start: 2017-01-01 | End: 2017-01-01 | Stop reason: SDUPTHER

## 2017-01-01 RX ORDER — METOPROLOL TARTRATE 50 MG/1
50 TABLET, FILM COATED ORAL 2 TIMES DAILY
COMMUNITY
End: 2017-01-01 | Stop reason: SDUPTHER

## 2017-01-01 RX ORDER — PANTOPRAZOLE SODIUM 40 MG/1
20 GRANULE, DELAYED RELEASE ORAL
Status: DISCONTINUED | OUTPATIENT
Start: 2017-01-01 | End: 2017-01-01

## 2017-01-01 RX ORDER — L. ACIDOPHILUS/L.BULGARICUS 1MM CELL
2 TABLET ORAL DAILY
Qty: 60 TABLET | Refills: 0 | Status: SHIPPED | OUTPATIENT
Start: 2017-01-01 | End: 2017-01-01 | Stop reason: SDUPTHER

## 2017-01-01 RX ORDER — LIDOCAINE 50 MG/G
3 PATCH TOPICAL DAILY
Status: DISCONTINUED | OUTPATIENT
Start: 2017-01-01 | End: 2017-01-01 | Stop reason: HOSPADM

## 2017-01-01 RX ORDER — METHADONE HYDROCHLORIDE 10 MG/1
10 TABLET ORAL 2 TIMES DAILY
Status: DISCONTINUED | OUTPATIENT
Start: 2017-01-01 | End: 2017-01-01

## 2017-01-01 RX ORDER — METOPROLOL TARTRATE 50 MG/1
50 TABLET, FILM COATED ORAL 2 TIMES DAILY
Status: CANCELLED | OUTPATIENT
Start: 2017-01-01

## 2017-01-01 RX ORDER — CITALOPRAM 10 MG/1
10 TABLET ORAL DAILY
Status: CANCELLED | OUTPATIENT
Start: 2017-01-01

## 2017-01-01 RX ORDER — ONDANSETRON 2 MG/ML
4 INJECTION INTRAMUSCULAR; INTRAVENOUS EVERY 6 HOURS PRN
Status: DISCONTINUED | OUTPATIENT
Start: 2017-01-01 | End: 2017-01-01 | Stop reason: SDUPTHER

## 2017-01-01 RX ORDER — MAGNESIUM OXIDE 400 MG/1
400 TABLET ORAL DAILY
COMMUNITY
End: 2017-01-01 | Stop reason: SDUPTHER

## 2017-01-01 RX ORDER — CYANOCOBALAMIN 1000 UG/ML
1000 INJECTION INTRAMUSCULAR; SUBCUTANEOUS WEEKLY
Status: DISCONTINUED | OUTPATIENT
Start: 2017-01-01 | End: 2017-01-01 | Stop reason: HOSPADM

## 2017-01-01 RX ORDER — CITALOPRAM 10 MG/1
10 TABLET ORAL EVERY MORNING
COMMUNITY
End: 2018-01-01 | Stop reason: ALTCHOICE

## 2017-01-01 RX ORDER — GUAIFENESIN 100 MG/5ML
10 SOLUTION ORAL EVERY 4 HOURS PRN
Status: CANCELLED | OUTPATIENT
Start: 2017-01-01

## 2017-01-01 RX ORDER — ONDANSETRON 2 MG/ML
4 INJECTION INTRAMUSCULAR; INTRAVENOUS EVERY 6 HOURS PRN
Status: DISCONTINUED | OUTPATIENT
Start: 2017-01-01 | End: 2017-01-01 | Stop reason: HOSPADM

## 2017-01-01 RX ORDER — HYDRALAZINE HYDROCHLORIDE 20 MG/ML
10 INJECTION INTRAMUSCULAR; INTRAVENOUS EVERY 10 MIN PRN
Status: DISCONTINUED | OUTPATIENT
Start: 2017-01-01 | End: 2017-01-01 | Stop reason: HOSPADM

## 2017-01-01 RX ORDER — DIPHENHYDRAMINE HYDROCHLORIDE 50 MG/ML
12.5 INJECTION INTRAMUSCULAR; INTRAVENOUS
Status: DISCONTINUED | OUTPATIENT
Start: 2017-01-01 | End: 2017-01-01

## 2017-01-01 RX ORDER — PANTOPRAZOLE SODIUM 40 MG/10ML
40 INJECTION, POWDER, LYOPHILIZED, FOR SOLUTION INTRAVENOUS DAILY
Status: DISCONTINUED | OUTPATIENT
Start: 2017-01-01 | End: 2017-01-01 | Stop reason: HOSPADM

## 2017-01-01 RX ORDER — DOCUSATE SODIUM 100 MG/1
100 CAPSULE, LIQUID FILLED ORAL 2 TIMES DAILY
Status: DISCONTINUED | OUTPATIENT
Start: 2017-01-01 | End: 2017-01-01 | Stop reason: HOSPADM

## 2017-01-01 RX ORDER — LISINOPRIL AND HYDROCHLOROTHIAZIDE 25; 20 MG/1; MG/1
1 TABLET ORAL DAILY
Status: DISCONTINUED | OUTPATIENT
Start: 2017-01-01 | End: 2017-01-01 | Stop reason: HOSPADM

## 2017-01-01 RX ORDER — HYDROCODONE BITARTRATE AND ACETAMINOPHEN 5; 325 MG/1; MG/1
2 TABLET ORAL PRN
Status: DISCONTINUED | OUTPATIENT
Start: 2017-01-01 | End: 2017-01-01

## 2017-01-01 RX ORDER — TAMSULOSIN HYDROCHLORIDE 0.4 MG/1
0.4 CAPSULE ORAL DAILY
Status: ON HOLD | COMMUNITY
End: 2017-01-01 | Stop reason: HOSPADM

## 2017-01-01 RX ORDER — MAGNESIUM HYDROXIDE/ALUMINUM HYDROXICE/SIMETHICONE 120; 1200; 1200 MG/30ML; MG/30ML; MG/30ML
30 SUSPENSION ORAL EVERY 4 HOURS PRN
Status: CANCELLED | OUTPATIENT
Start: 2017-01-01

## 2017-01-01 RX ORDER — METHADONE HYDROCHLORIDE 5 MG/1
5 TABLET ORAL 2 TIMES DAILY
COMMUNITY
End: 2018-01-01 | Stop reason: SDUPTHER

## 2017-01-01 RX ORDER — BISACODYL 10 MG
10 SUPPOSITORY, RECTAL RECTAL PRN
Status: CANCELLED | OUTPATIENT
Start: 2017-01-01

## 2017-01-01 RX ORDER — CALCIUM CARBONATE 200(500)MG
500 TABLET,CHEWABLE ORAL 2 TIMES DAILY
Qty: 30 TABLET | Refills: 0 | Status: SHIPPED | OUTPATIENT
Start: 2017-01-01 | End: 2017-01-01 | Stop reason: SDUPTHER

## 2017-01-01 RX ORDER — METOPROLOL TARTRATE 5 MG/5ML
10 INJECTION INTRAVENOUS EVERY 6 HOURS
Status: DISCONTINUED | OUTPATIENT
Start: 2017-01-01 | End: 2017-01-01 | Stop reason: ALTCHOICE

## 2017-01-01 RX ORDER — DOCUSATE SODIUM 100 MG/1
100 CAPSULE, LIQUID FILLED ORAL 2 TIMES DAILY PRN
COMMUNITY
End: 2017-01-01 | Stop reason: SDUPTHER

## 2017-01-01 RX ORDER — SODIUM CHLORIDE 9 MG/ML
INJECTION, SOLUTION INTRAVENOUS
Status: COMPLETED
Start: 2017-01-01 | End: 2017-01-01

## 2017-01-01 RX ORDER — ATORVASTATIN CALCIUM 10 MG/1
10 TABLET, FILM COATED ORAL DAILY
COMMUNITY
End: 2017-01-01 | Stop reason: SDUPTHER

## 2017-01-01 RX ORDER — CHLORAL HYDRATE 500 MG
1000 CAPSULE ORAL DAILY
Status: DISCONTINUED | OUTPATIENT
Start: 2017-01-01 | End: 2017-01-01

## 2017-01-01 RX ORDER — PANTOPRAZOLE SODIUM 40 MG/1
40 TABLET, DELAYED RELEASE ORAL
Status: DISCONTINUED | OUTPATIENT
Start: 2017-01-01 | End: 2017-01-01 | Stop reason: ALTCHOICE

## 2017-01-01 RX ORDER — DOCUSATE SODIUM 100 MG/1
100 CAPSULE, LIQUID FILLED ORAL 2 TIMES DAILY PRN
Status: DISCONTINUED | OUTPATIENT
Start: 2017-01-01 | End: 2017-01-01

## 2017-01-01 RX ORDER — CALCIUM CARBONATE 500(1250)
500 TABLET ORAL 2 TIMES DAILY
Status: ON HOLD | COMMUNITY
End: 2018-01-01 | Stop reason: HOSPADM

## 2017-01-01 RX ORDER — AMOXICILLIN 500 MG
CAPSULE ORAL DAILY
COMMUNITY
End: 2017-01-01

## 2017-01-01 RX ORDER — SODIUM CHLORIDE 0.9 % (FLUSH) 0.9 %
10 SYRINGE (ML) INJECTION PRN
Status: DISCONTINUED | OUTPATIENT
Start: 2017-01-01 | End: 2017-01-01

## 2017-01-01 RX ORDER — LORAZEPAM 1 MG/1
0.5 TABLET ORAL 2 TIMES DAILY
Qty: 30 TABLET | Refills: 0 | Status: SHIPPED | OUTPATIENT
Start: 2017-01-01 | End: 2018-01-01

## 2017-01-01 RX ORDER — ACETAMINOPHEN 650 MG/1
650 SUPPOSITORY RECTAL EVERY 4 HOURS PRN
Status: CANCELLED | OUTPATIENT
Start: 2017-01-01

## 2017-01-01 RX ORDER — ATORVASTATIN CALCIUM 10 MG/1
10 TABLET, FILM COATED ORAL NIGHTLY
Qty: 30 TABLET | Refills: 3 | Status: SHIPPED | OUTPATIENT
Start: 2017-01-01 | End: 2017-01-01 | Stop reason: SDUPTHER

## 2017-01-01 RX ORDER — CLOPIDOGREL BISULFATE 75 MG/1
75 TABLET ORAL DAILY
Status: DISCONTINUED | OUTPATIENT
Start: 2017-01-01 | End: 2017-01-01 | Stop reason: HOSPADM

## 2017-01-01 RX ADMIN — Medication 400 MG: at 09:03

## 2017-01-01 RX ADMIN — INSULIN LISPRO 1 UNITS: 100 INJECTION, SOLUTION INTRAVENOUS; SUBCUTANEOUS at 08:05

## 2017-01-01 RX ADMIN — ANTACID TABLETS 500 MG: 500 TABLET, CHEWABLE ORAL at 20:30

## 2017-01-01 RX ADMIN — Medication 400 MG: at 17:29

## 2017-01-01 RX ADMIN — LISINOPRIL AND HYDROCHLOROTHIAZIDE 1 TABLET: 25; 20 TABLET ORAL at 10:27

## 2017-01-01 RX ADMIN — METOPROLOL TARTRATE 50 MG: 50 TABLET ORAL at 09:44

## 2017-01-01 RX ADMIN — METOPROLOL TARTRATE 50 MG: 50 TABLET ORAL at 20:40

## 2017-01-01 RX ADMIN — INSULIN LISPRO 1 UNITS: 100 INJECTION, SOLUTION INTRAVENOUS; SUBCUTANEOUS at 08:38

## 2017-01-01 RX ADMIN — ATORVASTATIN CALCIUM 10 MG: 10 TABLET, FILM COATED ORAL at 21:53

## 2017-01-01 RX ADMIN — INSULIN LISPRO 1 UNITS: 100 INJECTION, SOLUTION INTRAVENOUS; SUBCUTANEOUS at 17:12

## 2017-01-01 RX ADMIN — ACETAMINOPHEN 650 MG: 325 TABLET ORAL at 04:32

## 2017-01-01 RX ADMIN — Medication 1000 MG: at 14:51

## 2017-01-01 RX ADMIN — ANTACID TABLETS 500 MG: 500 TABLET, CHEWABLE ORAL at 10:00

## 2017-01-01 RX ADMIN — Medication 1 DROP: at 10:06

## 2017-01-01 RX ADMIN — Medication 1 DROP: at 13:14

## 2017-01-01 RX ADMIN — Medication 1 DROP: at 20:40

## 2017-01-01 RX ADMIN — ATORVASTATIN CALCIUM 20 MG: 20 TABLET, FILM COATED ORAL at 20:12

## 2017-01-01 RX ADMIN — Medication 1 DROP: at 14:35

## 2017-01-01 RX ADMIN — INSULIN LISPRO 1 UNITS: 100 INJECTION, SOLUTION INTRAVENOUS; SUBCUTANEOUS at 16:53

## 2017-01-01 RX ADMIN — METOPROLOL TARTRATE 50 MG: 50 TABLET ORAL at 21:53

## 2017-01-01 RX ADMIN — Medication 100 MG: at 20:15

## 2017-01-01 RX ADMIN — SODIUM CHLORIDE: 4.5 INJECTION, SOLUTION INTRAVENOUS at 13:38

## 2017-01-01 RX ADMIN — METOPROLOL TARTRATE 25 MG: 25 TABLET ORAL at 10:01

## 2017-01-01 RX ADMIN — LACTOBACILLUS TAB 2 TABLET: TAB at 09:06

## 2017-01-01 RX ADMIN — CLOPIDOGREL BISULFATE 75 MG: 75 TABLET ORAL at 13:53

## 2017-01-01 RX ADMIN — METHADONE HYDROCHLORIDE 5 MG: 10 TABLET ORAL at 08:18

## 2017-01-01 RX ADMIN — ANTACID TABLETS 500 MG: 500 TABLET, CHEWABLE ORAL at 20:23

## 2017-01-01 RX ADMIN — CYANOCOBALAMIN 1000 MCG: 1000 INJECTION, SOLUTION INTRAMUSCULAR at 10:07

## 2017-01-01 RX ADMIN — METOPROLOL TARTRATE 50 MG: 50 TABLET ORAL at 22:51

## 2017-01-01 RX ADMIN — Medication 100 MG: at 08:21

## 2017-01-01 RX ADMIN — PANTOPRAZOLE SODIUM 40 MG: 40 TABLET, DELAYED RELEASE ORAL at 12:12

## 2017-01-01 RX ADMIN — Medication 100 MG: at 08:35

## 2017-01-01 RX ADMIN — Medication 1000 MG: at 22:51

## 2017-01-01 RX ADMIN — CITALOPRAM HYDROBROMIDE 10 MG: 10 TABLET ORAL at 09:44

## 2017-01-01 RX ADMIN — Medication 400 MG: at 16:55

## 2017-01-01 RX ADMIN — Medication 1000 MG: at 15:24

## 2017-01-01 RX ADMIN — ACETAMINOPHEN 650 MG: 325 TABLET ORAL at 17:11

## 2017-01-01 RX ADMIN — ATORVASTATIN CALCIUM 10 MG: 10 TABLET, FILM COATED ORAL at 22:56

## 2017-01-01 RX ADMIN — HYDROMORPHONE HYDROCHLORIDE 1 MG: 1 INJECTION, SOLUTION INTRAMUSCULAR; INTRAVENOUS; SUBCUTANEOUS at 17:29

## 2017-01-01 RX ADMIN — METOPROLOL TARTRATE 50 MG: 50 TABLET ORAL at 23:08

## 2017-01-01 RX ADMIN — METHADONE HYDROCHLORIDE 5 MG: 10 TABLET ORAL at 09:10

## 2017-01-01 RX ADMIN — FAMOTIDINE 20 MG: 20 TABLET, FILM COATED ORAL at 23:08

## 2017-01-01 RX ADMIN — Medication 1 DROP: at 17:34

## 2017-01-01 RX ADMIN — Medication 1 DROP: at 17:29

## 2017-01-01 RX ADMIN — TAMSULOSIN HYDROCHLORIDE 0.4 MG: 0.4 CAPSULE ORAL at 20:40

## 2017-01-01 RX ADMIN — METOPROLOL TARTRATE 50 MG: 50 TABLET ORAL at 08:39

## 2017-01-01 RX ADMIN — NITROFURANTOIN MONOHYDRATE AND NITROFURANTOIN MACROCRYSTALLINE 100 MG: 75; 25 CAPSULE ORAL at 09:06

## 2017-01-01 RX ADMIN — ANTACID TABLETS 500 MG: 500 TABLET, CHEWABLE ORAL at 09:06

## 2017-01-01 RX ADMIN — Medication 400 MG: at 17:23

## 2017-01-01 RX ADMIN — PANTOPRAZOLE SODIUM 40 MG: 40 GRANULE, DELAYED RELEASE ORAL at 05:44

## 2017-01-01 RX ADMIN — Medication 1 DROP: at 16:55

## 2017-01-01 RX ADMIN — INSULIN LISPRO 1 UNITS: 100 INJECTION, SOLUTION INTRAVENOUS; SUBCUTANEOUS at 13:18

## 2017-01-01 RX ADMIN — CITALOPRAM HYDROBROMIDE 10 MG: 10 TABLET ORAL at 08:21

## 2017-01-01 RX ADMIN — METHADONE HYDROCHLORIDE 5 MG: 10 TABLET ORAL at 10:22

## 2017-01-01 RX ADMIN — Medication 1000 MG: at 14:30

## 2017-01-01 RX ADMIN — ANTACID TABLETS 500 MG: 500 TABLET, CHEWABLE ORAL at 21:28

## 2017-01-01 RX ADMIN — I.V. FAT EMULSION 500 ML: 20 EMULSION INTRAVENOUS at 12:13

## 2017-01-01 RX ADMIN — CITALOPRAM HYDROBROMIDE 10 MG: 10 TABLET ORAL at 10:05

## 2017-01-01 RX ADMIN — LISINOPRIL AND HYDROCHLOROTHIAZIDE 1 TABLET: 25; 20 TABLET ORAL at 13:17

## 2017-01-01 RX ADMIN — LACTOBACILLUS TAB 2 TABLET: TAB at 07:48

## 2017-01-01 RX ADMIN — METHADONE HYDROCHLORIDE 5 MG: 10 TABLET ORAL at 22:07

## 2017-01-01 RX ADMIN — SODIUM CHLORIDE, PRESERVATIVE FREE 10 ML: 5 INJECTION INTRAVENOUS at 20:13

## 2017-01-01 RX ADMIN — Medication 1 DROP: at 20:20

## 2017-01-01 RX ADMIN — CITALOPRAM HYDROBROMIDE 10 MG: 10 TABLET ORAL at 08:18

## 2017-01-01 RX ADMIN — METHADONE HYDROCHLORIDE 5 MG: 10 TABLET ORAL at 20:29

## 2017-01-01 RX ADMIN — INSULIN LISPRO 1 UNITS: 100 INJECTION, SOLUTION INTRAVENOUS; SUBCUTANEOUS at 17:19

## 2017-01-01 RX ADMIN — METHADONE HYDROCHLORIDE 5 MG: 5 TABLET ORAL at 23:08

## 2017-01-01 RX ADMIN — METOPROLOL TARTRATE 25 MG: 25 TABLET ORAL at 20:16

## 2017-01-01 RX ADMIN — FAMOTIDINE 20 MG: 10 INJECTION, SOLUTION INTRAVENOUS at 10:51

## 2017-01-01 RX ADMIN — INSULIN LISPRO 1 UNITS: 100 INJECTION, SOLUTION INTRAVENOUS; SUBCUTANEOUS at 09:07

## 2017-01-01 RX ADMIN — METOPROLOL TARTRATE 50 MG: 50 TABLET ORAL at 21:27

## 2017-01-01 RX ADMIN — BARIUM SULFATE 50 G: 0.81 POWDER, FOR SUSPENSION ORAL at 14:05

## 2017-01-01 RX ADMIN — Medication 1 DROP: at 23:08

## 2017-01-01 RX ADMIN — TAMSULOSIN HYDROCHLORIDE 0.4 MG: 0.4 CAPSULE ORAL at 21:28

## 2017-01-01 RX ADMIN — METHADONE HYDROCHLORIDE 5 MG: 10 TABLET ORAL at 07:51

## 2017-01-01 RX ADMIN — Medication 1 DROP: at 12:34

## 2017-01-01 RX ADMIN — ASPIRIN 81 MG 81 MG: 81 TABLET ORAL at 09:03

## 2017-01-01 RX ADMIN — PANTOPRAZOLE SODIUM 40 MG: 40 INJECTION, POWDER, FOR SOLUTION INTRAVENOUS at 18:48

## 2017-01-01 RX ADMIN — METHADONE HYDROCHLORIDE 5 MG: 10 TABLET ORAL at 10:34

## 2017-01-01 RX ADMIN — NITROFURANTOIN MONOHYDRATE AND NITROFURANTOIN MACROCRYSTALLINE 100 MG: 75; 25 CAPSULE ORAL at 22:15

## 2017-01-01 RX ADMIN — CITALOPRAM HYDROBROMIDE 10 MG: 10 TABLET ORAL at 08:38

## 2017-01-01 RX ADMIN — Medication 1000 MG: at 13:17

## 2017-01-01 RX ADMIN — METHADONE HYDROCHLORIDE 5 MG: 10 TABLET ORAL at 20:12

## 2017-01-01 RX ADMIN — ALUMINUM HYDROXIDE, MAGNESIUM HYDROXIDE, AND SIMETHICONE 30 ML: 200; 200; 20 SUSPENSION ORAL at 15:23

## 2017-01-01 RX ADMIN — ATORVASTATIN CALCIUM 10 MG: 10 TABLET, FILM COATED ORAL at 22:16

## 2017-01-01 RX ADMIN — METHADONE HYDROCHLORIDE 5 MG: 10 TABLET ORAL at 10:03

## 2017-01-01 RX ADMIN — ATORVASTATIN CALCIUM 10 MG: 10 TABLET, FILM COATED ORAL at 22:07

## 2017-01-01 RX ADMIN — ANTACID TABLETS 500 MG: 500 TABLET, CHEWABLE ORAL at 20:16

## 2017-01-01 RX ADMIN — PANTOPRAZOLE SODIUM 40 MG: 40 GRANULE, DELAYED RELEASE ORAL at 16:52

## 2017-01-01 RX ADMIN — SULFAMETHOXAZOLE AND TRIMETHOPRIM 1 TABLET: 800; 160 TABLET ORAL at 05:54

## 2017-01-01 RX ADMIN — PANTOPRAZOLE SODIUM 40 MG: 40 GRANULE, DELAYED RELEASE ORAL at 16:54

## 2017-01-01 RX ADMIN — NITROFURANTOIN MONOHYDRATE AND NITROFURANTOIN MACROCRYSTALLINE 100 MG: 75; 25 CAPSULE ORAL at 10:21

## 2017-01-01 RX ADMIN — PANTOPRAZOLE SODIUM 40 MG: 40 GRANULE, DELAYED RELEASE ORAL at 06:04

## 2017-01-01 RX ADMIN — NITROFURANTOIN MONOHYDRATE AND NITROFURANTOIN MACROCRYSTALLINE 100 MG: 75; 25 CAPSULE ORAL at 09:44

## 2017-01-01 RX ADMIN — Medication 1 DROP: at 08:20

## 2017-01-01 RX ADMIN — NITROFURANTOIN MONOHYDRATE AND NITROFURANTOIN MACROCRYSTALLINE 100 MG: 75; 25 CAPSULE ORAL at 08:30

## 2017-01-01 RX ADMIN — METOPROLOL TARTRATE 50 MG: 50 TABLET ORAL at 08:36

## 2017-01-01 RX ADMIN — FAMOTIDINE 20 MG: 10 INJECTION, SOLUTION INTRAVENOUS at 22:38

## 2017-01-01 RX ADMIN — TAMSULOSIN HYDROCHLORIDE 0.4 MG: 0.4 CAPSULE ORAL at 22:15

## 2017-01-01 RX ADMIN — PANTOPRAZOLE SODIUM 40 MG: 40 GRANULE, DELAYED RELEASE ORAL at 05:40

## 2017-01-01 RX ADMIN — FERROUS SULFATE TAB 325 MG (65 MG ELEMENTAL FE) 325 MG: 325 (65 FE) TAB at 09:03

## 2017-01-01 RX ADMIN — NITROFURANTOIN MONOHYDRATE AND NITROFURANTOIN MACROCRYSTALLINE 100 MG: 75; 25 CAPSULE ORAL at 20:33

## 2017-01-01 RX ADMIN — SODIUM POLYSTYRENE SULFONATE 15 G: 15 SUSPENSION ORAL; RECTAL at 15:18

## 2017-01-01 RX ADMIN — LISINOPRIL AND HYDROCHLOROTHIAZIDE 1 TABLET: 25; 20 TABLET ORAL at 17:34

## 2017-01-01 RX ADMIN — Medication 400 MG: at 17:12

## 2017-01-01 RX ADMIN — FAMOTIDINE 20 MG: 10 INJECTION, SOLUTION INTRAVENOUS at 20:12

## 2017-01-01 RX ADMIN — LISINOPRIL AND HYDROCHLOROTHIAZIDE 1 TABLET: 25; 20 TABLET ORAL at 08:20

## 2017-01-01 RX ADMIN — LISINOPRIL AND HYDROCHLOROTHIAZIDE 1 TABLET: 25; 20 TABLET ORAL at 15:26

## 2017-01-01 RX ADMIN — NITROFURANTOIN MONOHYDRATE AND NITROFURANTOIN MACROCRYSTALLINE 100 MG: 75; 25 CAPSULE ORAL at 07:52

## 2017-01-01 RX ADMIN — METOPROLOL TARTRATE 10 MG: 5 INJECTION INTRAVENOUS at 02:35

## 2017-01-01 RX ADMIN — CITALOPRAM HYDROBROMIDE 10 MG: 10 TABLET ORAL at 10:01

## 2017-01-01 RX ADMIN — NITROFURANTOIN MONOHYDRATE AND NITROFURANTOIN MACROCRYSTALLINE 100 MG: 75; 25 CAPSULE ORAL at 08:18

## 2017-01-01 RX ADMIN — INSULIN LISPRO 1 UNITS: 100 INJECTION, SOLUTION INTRAVENOUS; SUBCUTANEOUS at 17:56

## 2017-01-01 RX ADMIN — NITROFURANTOIN MONOHYDRATE/MACROCRYSTALLINE 100 MG: 25; 75 CAPSULE ORAL at 21:44

## 2017-01-01 RX ADMIN — Medication 1 DROP: at 18:00

## 2017-01-01 RX ADMIN — SODIUM CHLORIDE, PRESERVATIVE FREE 10 ML: 5 INJECTION INTRAVENOUS at 10:22

## 2017-01-01 RX ADMIN — NITROFURANTOIN MONOHYDRATE AND NITROFURANTOIN MACROCRYSTALLINE 100 MG: 75; 25 CAPSULE ORAL at 10:27

## 2017-01-01 RX ADMIN — Medication 100 MG: at 10:01

## 2017-01-01 RX ADMIN — LISINOPRIL 40 MG: 20 TABLET ORAL at 08:38

## 2017-01-01 RX ADMIN — FERROUS SULFATE TAB 325 MG (65 MG ELEMENTAL FE) 325 MG: 325 (65 FE) TAB at 08:39

## 2017-01-01 RX ADMIN — Medication 1 DROP: at 20:45

## 2017-01-01 RX ADMIN — PANTOPRAZOLE SODIUM 40 MG: 40 GRANULE, DELAYED RELEASE ORAL at 08:21

## 2017-01-01 RX ADMIN — METOPROLOL TARTRATE 50 MG: 50 TABLET ORAL at 08:33

## 2017-01-01 RX ADMIN — ANTACID TABLETS 500 MG: 500 TABLET, CHEWABLE ORAL at 22:15

## 2017-01-01 RX ADMIN — Medication 1 DROP: at 21:39

## 2017-01-01 RX ADMIN — TAMSULOSIN HYDROCHLORIDE 0.4 MG: 0.4 CAPSULE ORAL at 21:27

## 2017-01-01 RX ADMIN — METHADONE HYDROCHLORIDE 5 MG: 10 TABLET ORAL at 22:57

## 2017-01-01 RX ADMIN — METOPROLOL TARTRATE 50 MG: 50 TABLET ORAL at 09:06

## 2017-01-01 RX ADMIN — LISINOPRIL AND HYDROCHLOROTHIAZIDE 1 TABLET: 25; 20 TABLET ORAL at 08:30

## 2017-01-01 RX ADMIN — METHADONE HYDROCHLORIDE 5 MG: 10 TABLET ORAL at 08:25

## 2017-01-01 RX ADMIN — PROTHROMBIN, COAGULATION FACTOR VII HUMAN, COAGULATION FACTOR IX HUMAN, COAGULATION FACTOR X HUMAN, PROTEIN C, PROTEIN S HUMAN, AND WATER 4080 UNITS: KIT at 13:13

## 2017-01-01 RX ADMIN — Medication 1 DROP: at 10:04

## 2017-01-01 RX ADMIN — LACTOBACILLUS TAB 2 TABLET: TAB at 08:30

## 2017-01-01 RX ADMIN — NITROFURANTOIN MONOHYDRATE/MACROCRYSTALLINE 100 MG: 25; 75 CAPSULE ORAL at 10:06

## 2017-01-01 RX ADMIN — Medication 1 DROP: at 20:27

## 2017-01-01 RX ADMIN — CITALOPRAM HYDROBROMIDE 10 MG: 10 TABLET ORAL at 07:51

## 2017-01-01 RX ADMIN — ATORVASTATIN CALCIUM 10 MG: 10 TABLET, FILM COATED ORAL at 20:40

## 2017-01-01 RX ADMIN — ASPIRIN 81 MG 81 MG: 81 TABLET ORAL at 08:33

## 2017-01-01 RX ADMIN — METOPROLOL TARTRATE 50 MG: 50 TABLET ORAL at 21:28

## 2017-01-01 RX ADMIN — LACTOBACILLUS TAB 2 TABLET: TAB at 17:28

## 2017-01-01 RX ADMIN — Medication 400 MG: at 17:18

## 2017-01-01 RX ADMIN — ATORVASTATIN CALCIUM 10 MG: 10 TABLET, FILM COATED ORAL at 09:02

## 2017-01-01 RX ADMIN — PANTOPRAZOLE SODIUM 40 MG: 40 GRANULE, DELAYED RELEASE ORAL at 16:56

## 2017-01-01 RX ADMIN — NITROFURANTOIN MONOHYDRATE AND NITROFURANTOIN MACROCRYSTALLINE 100 MG: 75; 25 CAPSULE ORAL at 08:21

## 2017-01-01 RX ADMIN — LISINOPRIL AND HYDROCHLOROTHIAZIDE 1 TABLET: 25; 20 TABLET ORAL at 10:34

## 2017-01-01 RX ADMIN — LIDOCAINE HYDROCHLORIDE 30 MG: 20 INJECTION, SOLUTION INFILTRATION; PERINEURAL at 10:39

## 2017-01-01 RX ADMIN — ANTACID TABLETS 500 MG: 500 TABLET, CHEWABLE ORAL at 08:35

## 2017-01-01 RX ADMIN — ANTACID TABLETS 500 MG: 500 TABLET, CHEWABLE ORAL at 09:44

## 2017-01-01 RX ADMIN — Medication 1 DROP: at 14:18

## 2017-01-01 RX ADMIN — INSULIN LISPRO 1 UNITS: 100 INJECTION, SOLUTION INTRAVENOUS; SUBCUTANEOUS at 13:40

## 2017-01-01 RX ADMIN — Medication 1000 MG: at 12:12

## 2017-01-01 RX ADMIN — PROPOFOL 50 MG: 10 INJECTION, EMULSION INTRAVENOUS at 10:35

## 2017-01-01 RX ADMIN — ANTACID TABLETS 500 MG: 500 TABLET, CHEWABLE ORAL at 22:07

## 2017-01-01 RX ADMIN — METHADONE HYDROCHLORIDE 5 MG: 10 TABLET ORAL at 20:39

## 2017-01-01 RX ADMIN — ATORVASTATIN CALCIUM 10 MG: 10 TABLET, FILM COATED ORAL at 20:21

## 2017-01-01 RX ADMIN — LACTOBACILLUS TAB 2 TABLET: TAB at 08:35

## 2017-01-01 RX ADMIN — ATORVASTATIN CALCIUM 10 MG: 10 TABLET, FILM COATED ORAL at 20:30

## 2017-01-01 RX ADMIN — CITALOPRAM HYDROBROMIDE 10 MG: 10 TABLET ORAL at 09:06

## 2017-01-01 RX ADMIN — NITROFURANTOIN MONOHYDRATE/MACROCRYSTALLINE 100 MG: 25; 75 CAPSULE ORAL at 20:11

## 2017-01-01 RX ADMIN — SODIUM CHLORIDE: 9 INJECTION, SOLUTION INTRAVENOUS at 20:12

## 2017-01-01 RX ADMIN — ATORVASTATIN CALCIUM 10 MG: 10 TABLET, FILM COATED ORAL at 21:36

## 2017-01-01 RX ADMIN — CITALOPRAM HYDROBROMIDE 10 MG: 10 TABLET ORAL at 10:38

## 2017-01-01 RX ADMIN — TAMSULOSIN HYDROCHLORIDE 0.4 MG: 0.4 CAPSULE ORAL at 20:16

## 2017-01-01 RX ADMIN — Medication 1000 MG: at 14:36

## 2017-01-01 RX ADMIN — TAMSULOSIN HYDROCHLORIDE 0.4 MG: 0.4 CAPSULE ORAL at 20:39

## 2017-01-01 RX ADMIN — Medication 1 DROP: at 13:20

## 2017-01-01 RX ADMIN — Medication 1 DROP: at 21:29

## 2017-01-01 RX ADMIN — LISINOPRIL AND HYDROCHLOROTHIAZIDE 1 TABLET: 25; 20 TABLET ORAL at 10:20

## 2017-01-01 RX ADMIN — METOPROLOL TARTRATE 50 MG: 50 TABLET ORAL at 22:55

## 2017-01-01 RX ADMIN — METHADONE HYDROCHLORIDE 5 MG: 10 TABLET ORAL at 20:40

## 2017-01-01 RX ADMIN — ASCORBIC ACID, VITAMIN A PALMITATE, CHOLECALCIFEROL, THIAMINE HYDROCHLORIDE, RIBOFLAVIN-5 PHOSPHATE SODIUM, PYRIDOXINE HYDROCHLORIDE, NIACINAMIDE, DEXPANTHENOL, ALPHA-TOCOPHEROL ACETATE, VITAMIN K1, FOLIC ACID, BIOTIN, CYANOCOBALAMIN: 200; 3300; 200; 6; 3.6; 6; 40; 15; 10; 150; 600; 60; 5 INJECTION, SOLUTION INTRAVENOUS at 12:13

## 2017-01-01 RX ADMIN — METHADONE HYDROCHLORIDE 5 MG: 10 TABLET ORAL at 20:15

## 2017-01-01 RX ADMIN — METOPROLOL TARTRATE 50 MG: 50 TABLET ORAL at 09:02

## 2017-01-01 RX ADMIN — Medication 400 MG: at 16:58

## 2017-01-01 RX ADMIN — ATORVASTATIN CALCIUM 10 MG: 10 TABLET, FILM COATED ORAL at 23:08

## 2017-01-01 RX ADMIN — CLOPIDOGREL BISULFATE 75 MG: 75 TABLET ORAL at 09:03

## 2017-01-01 RX ADMIN — METHADONE HYDROCHLORIDE 5 MG: 10 TABLET ORAL at 08:51

## 2017-01-01 RX ADMIN — METOPROLOL TARTRATE 50 MG: 50 TABLET ORAL at 22:16

## 2017-01-01 RX ADMIN — INSULIN LISPRO 1 UNITS: 100 INJECTION, SOLUTION INTRAVENOUS; SUBCUTANEOUS at 08:22

## 2017-01-01 RX ADMIN — INSULIN LISPRO 1 UNITS: 100 INJECTION, SOLUTION INTRAVENOUS; SUBCUTANEOUS at 16:58

## 2017-01-01 RX ADMIN — Medication 400 MG: at 17:32

## 2017-01-01 RX ADMIN — INSULIN LISPRO 1 UNITS: 100 INJECTION, SOLUTION INTRAVENOUS; SUBCUTANEOUS at 14:46

## 2017-01-01 RX ADMIN — ANTACID TABLETS 500 MG: 500 TABLET, CHEWABLE ORAL at 08:18

## 2017-01-01 RX ADMIN — INSULIN LISPRO 1 UNITS: 100 INJECTION, SOLUTION INTRAVENOUS; SUBCUTANEOUS at 12:12

## 2017-01-01 RX ADMIN — METOPROLOL TARTRATE 50 MG: 50 TABLET ORAL at 20:30

## 2017-01-01 RX ADMIN — Medication 400 MG: at 17:31

## 2017-01-01 RX ADMIN — TAMSULOSIN HYDROCHLORIDE 0.4 MG: 0.4 CAPSULE ORAL at 21:45

## 2017-01-01 RX ADMIN — LACTOBACILLUS TAB 2 TABLET: TAB at 10:20

## 2017-01-01 RX ADMIN — NITROFURANTOIN MONOHYDRATE AND NITROFURANTOIN MACROCRYSTALLINE 100 MG: 75; 25 CAPSULE ORAL at 22:06

## 2017-01-01 RX ADMIN — SODIUM CHLORIDE, PRESERVATIVE FREE 10 ML: 5 INJECTION INTRAVENOUS at 14:16

## 2017-01-01 RX ADMIN — NITROFURANTOIN MONOHYDRATE AND NITROFURANTOIN MACROCRYSTALLINE 100 MG: 75; 25 CAPSULE ORAL at 20:31

## 2017-01-01 RX ADMIN — ATORVASTATIN CALCIUM 10 MG: 10 TABLET, FILM COATED ORAL at 21:27

## 2017-01-01 RX ADMIN — INSULIN LISPRO 1 UNITS: 100 INJECTION, SOLUTION INTRAVENOUS; SUBCUTANEOUS at 13:54

## 2017-01-01 RX ADMIN — CLOPIDOGREL BISULFATE 75 MG: 75 TABLET ORAL at 08:33

## 2017-01-01 RX ADMIN — SODIUM CHLORIDE 1000 ML: 900 INJECTION, SOLUTION INTRAVENOUS at 09:32

## 2017-01-01 RX ADMIN — SODIUM CHLORIDE: 900 INJECTION, SOLUTION INTRAVENOUS at 10:20

## 2017-01-01 RX ADMIN — FAMOTIDINE 20 MG: 20 TABLET, FILM COATED ORAL at 08:39

## 2017-01-01 RX ADMIN — LISINOPRIL 40 MG: 20 TABLET ORAL at 10:06

## 2017-01-01 RX ADMIN — METHADONE HYDROCHLORIDE 5 MG: 10 TABLET ORAL at 10:01

## 2017-01-01 RX ADMIN — NITROFURANTOIN MONOHYDRATE AND NITROFURANTOIN MACROCRYSTALLINE 100 MG: 75; 25 CAPSULE ORAL at 20:30

## 2017-01-01 RX ADMIN — NITROFURANTOIN MONOHYDRATE/MACROCRYSTALLINE 100 MG: 25; 75 CAPSULE ORAL at 21:28

## 2017-01-01 RX ADMIN — METHADONE HYDROCHLORIDE 5 MG: 10 TABLET ORAL at 10:00

## 2017-01-01 RX ADMIN — LACTOBACILLUS TAB 2 TABLET: TAB at 10:00

## 2017-01-01 RX ADMIN — Medication 1 DROP: at 08:01

## 2017-01-01 RX ADMIN — NITROFURANTOIN MONOHYDRATE AND NITROFURANTOIN MACROCRYSTALLINE 100 MG: 75; 25 CAPSULE ORAL at 20:21

## 2017-01-01 RX ADMIN — PANTOPRAZOLE SODIUM 40 MG: 40 INJECTION, POWDER, FOR SOLUTION INTRAVENOUS at 14:56

## 2017-01-01 RX ADMIN — PANTOPRAZOLE SODIUM 20 MG: 40 GRANULE, DELAYED RELEASE ORAL at 05:54

## 2017-01-01 RX ADMIN — METFORMIN HYDROCHLORIDE 500 MG: 500 TABLET ORAL at 08:38

## 2017-01-01 RX ADMIN — METHADONE HYDROCHLORIDE 10 MG: 10 TABLET ORAL at 08:38

## 2017-01-01 RX ADMIN — PANTOPRAZOLE SODIUM 40 MG: 40 GRANULE, DELAYED RELEASE ORAL at 06:44

## 2017-01-01 RX ADMIN — METOPROLOL TARTRATE 50 MG: 50 TABLET ORAL at 20:31

## 2017-01-01 RX ADMIN — TAMSULOSIN HYDROCHLORIDE 0.4 MG: 0.4 CAPSULE ORAL at 20:30

## 2017-01-01 RX ADMIN — METOPROLOL TARTRATE 50 MG: 50 TABLET ORAL at 08:30

## 2017-01-01 RX ADMIN — METHADONE HYDROCHLORIDE 5 MG: 10 TABLET ORAL at 20:23

## 2017-01-01 RX ADMIN — DEXTROSE AND SODIUM CHLORIDE: 5; 450 INJECTION, SOLUTION INTRAVENOUS at 04:51

## 2017-01-01 RX ADMIN — NITROFURANTOIN MONOHYDRATE/MACROCRYSTALLINE 100 MG: 25; 75 CAPSULE ORAL at 08:25

## 2017-01-01 RX ADMIN — Medication 100 MG: at 22:40

## 2017-01-01 RX ADMIN — FAMOTIDINE 20 MG: 10 INJECTION, SOLUTION INTRAVENOUS at 23:14

## 2017-01-01 RX ADMIN — INSULIN LISPRO 1 UNITS: 100 INJECTION, SOLUTION INTRAVENOUS; SUBCUTANEOUS at 16:47

## 2017-01-01 RX ADMIN — Medication 1000 MG: at 13:13

## 2017-01-01 RX ADMIN — PANTOPRAZOLE SODIUM 40 MG: 40 GRANULE, DELAYED RELEASE ORAL at 16:43

## 2017-01-01 RX ADMIN — Medication 400 MG: at 16:52

## 2017-01-01 RX ADMIN — PROPOFOL 50 MCG/KG/MIN: 10 INJECTION, EMULSION INTRAVENOUS at 10:39

## 2017-01-01 RX ADMIN — PANTOPRAZOLE SODIUM 40 MG: 40 GRANULE, DELAYED RELEASE ORAL at 06:07

## 2017-01-01 RX ADMIN — TAMSULOSIN HYDROCHLORIDE 0.4 MG: 0.4 CAPSULE ORAL at 20:22

## 2017-01-01 RX ADMIN — METHADONE HYDROCHLORIDE 5 MG: 10 TABLET ORAL at 21:58

## 2017-01-01 RX ADMIN — INSULIN LISPRO 1 UNITS: 100 INJECTION, SOLUTION INTRAVENOUS; SUBCUTANEOUS at 10:16

## 2017-01-01 RX ADMIN — LACTOBACILLUS TAB 2 TABLET: TAB at 10:34

## 2017-01-01 RX ADMIN — INSULIN LISPRO 1 UNITS: 100 INJECTION, SOLUTION INTRAVENOUS; SUBCUTANEOUS at 14:14

## 2017-01-01 RX ADMIN — FAMOTIDINE 20 MG: 10 INJECTION, SOLUTION INTRAVENOUS at 09:01

## 2017-01-01 RX ADMIN — Medication 400 MG: at 17:36

## 2017-01-01 RX ADMIN — METOPROLOL TARTRATE 50 MG: 50 TABLET ORAL at 19:48

## 2017-01-01 RX ADMIN — NITROFURANTOIN MONOHYDRATE AND NITROFURANTOIN MACROCRYSTALLINE 100 MG: 75; 25 CAPSULE ORAL at 10:01

## 2017-01-01 RX ADMIN — ANTACID TABLETS 500 MG: 500 TABLET, CHEWABLE ORAL at 20:40

## 2017-01-01 RX ADMIN — NITROFURANTOIN MONOHYDRATE AND NITROFURANTOIN MACROCRYSTALLINE 100 MG: 75; 25 CAPSULE ORAL at 10:00

## 2017-01-01 RX ADMIN — METHADONE HYDROCHLORIDE 5 MG: 5 TABLET ORAL at 09:03

## 2017-01-01 RX ADMIN — NITROFURANTOIN MONOHYDRATE AND NITROFURANTOIN MACROCRYSTALLINE 100 MG: 75; 25 CAPSULE ORAL at 21:28

## 2017-01-01 RX ADMIN — ANTACID TABLETS 500 MG: 500 TABLET, CHEWABLE ORAL at 20:38

## 2017-01-01 RX ADMIN — TAMSULOSIN HYDROCHLORIDE 0.4 MG: 0.4 CAPSULE ORAL at 21:53

## 2017-01-01 RX ADMIN — CIPROFLOXACIN 200 MG: 2 INJECTION INTRAVENOUS at 20:11

## 2017-01-01 RX ADMIN — FAMOTIDINE 20 MG: 20 TABLET, FILM COATED ORAL at 22:51

## 2017-01-01 RX ADMIN — Medication 1 DROP: at 12:59

## 2017-01-01 RX ADMIN — Medication 1 DROP: at 09:53

## 2017-01-01 RX ADMIN — METOPROLOL TARTRATE 50 MG: 50 TABLET ORAL at 10:28

## 2017-01-01 RX ADMIN — LACTOBACILLUS TAB 2 TABLET: TAB at 09:45

## 2017-01-01 RX ADMIN — CYANOCOBALAMIN 1000 MCG: 1000 INJECTION, SOLUTION INTRAMUSCULAR at 17:17

## 2017-01-01 RX ADMIN — Medication 1 DROP: at 13:58

## 2017-01-01 RX ADMIN — METOPROLOL TARTRATE 50 MG: 50 TABLET ORAL at 08:18

## 2017-01-01 RX ADMIN — DEXTROSE AND SODIUM CHLORIDE: 5; 450 INJECTION, SOLUTION INTRAVENOUS at 19:49

## 2017-01-01 RX ADMIN — NITROFURANTOIN MONOHYDRATE AND NITROFURANTOIN MACROCRYSTALLINE 100 MG: 75; 25 CAPSULE ORAL at 08:35

## 2017-01-01 RX ADMIN — ANTACID TABLETS 500 MG: 500 TABLET, CHEWABLE ORAL at 10:38

## 2017-01-01 RX ADMIN — INSULIN LISPRO 2 UNITS: 100 INJECTION, SOLUTION INTRAVENOUS; SUBCUTANEOUS at 17:06

## 2017-01-01 RX ADMIN — SODIUM CHLORIDE, PRESERVATIVE FREE 10 ML: 5 INJECTION INTRAVENOUS at 12:11

## 2017-01-01 RX ADMIN — Medication 1 DROP: at 22:21

## 2017-01-01 RX ADMIN — METOPROLOL TARTRATE 50 MG: 50 TABLET ORAL at 20:21

## 2017-01-01 RX ADMIN — INSULIN LISPRO 2 UNITS: 100 INJECTION, SOLUTION INTRAVENOUS; SUBCUTANEOUS at 14:33

## 2017-01-01 RX ADMIN — NITROFURANTOIN MONOHYDRATE AND NITROFURANTOIN MACROCRYSTALLINE 100 MG: 75; 25 CAPSULE ORAL at 10:35

## 2017-01-01 RX ADMIN — EPHEDRINE SULFATE 10 MG: 50 INJECTION, SOLUTION INTRAVENOUS at 11:34

## 2017-01-01 RX ADMIN — CITALOPRAM HYDROBROMIDE 10 MG: 10 TABLET ORAL at 10:27

## 2017-01-01 RX ADMIN — PANTOPRAZOLE SODIUM 40 MG: 40 GRANULE, DELAYED RELEASE ORAL at 06:17

## 2017-01-01 RX ADMIN — METHADONE HYDROCHLORIDE 5 MG: 10 TABLET ORAL at 08:34

## 2017-01-01 RX ADMIN — INSULIN LISPRO 1 UNITS: 100 INJECTION, SOLUTION INTRAVENOUS; SUBCUTANEOUS at 17:36

## 2017-01-01 RX ADMIN — ANTACID TABLETS 500 MG: 500 TABLET, CHEWABLE ORAL at 21:53

## 2017-01-01 RX ADMIN — METOPROLOL TARTRATE 50 MG: 50 TABLET ORAL at 10:20

## 2017-01-01 RX ADMIN — ANTACID TABLETS 500 MG: 500 TABLET, CHEWABLE ORAL at 08:30

## 2017-01-01 RX ADMIN — Medication 1000 MG: at 13:47

## 2017-01-01 RX ADMIN — PANTOPRAZOLE SODIUM 40 MG: 40 GRANULE, DELAYED RELEASE ORAL at 05:43

## 2017-01-01 RX ADMIN — CITALOPRAM HYDROBROMIDE 10 MG: 10 TABLET, FILM COATED ORAL at 09:03

## 2017-01-01 RX ADMIN — INSULIN LISPRO 1 UNITS: 100 INJECTION, SOLUTION INTRAVENOUS; SUBCUTANEOUS at 17:32

## 2017-01-01 RX ADMIN — Medication 1 DROP: at 10:17

## 2017-01-01 RX ADMIN — CITALOPRAM HYDROBROMIDE 10 MG: 10 TABLET, FILM COATED ORAL at 23:09

## 2017-01-01 RX ADMIN — METOPROLOL TARTRATE 50 MG: 50 TABLET ORAL at 10:05

## 2017-01-01 RX ADMIN — ALUMINUM HYDROXIDE, MAGNESIUM HYDROXIDE, AND SIMETHICONE 30 ML: 200; 200; 20 SUSPENSION ORAL at 12:14

## 2017-01-01 RX ADMIN — TAMSULOSIN HYDROCHLORIDE 0.4 MG: 0.4 CAPSULE ORAL at 22:55

## 2017-01-01 RX ADMIN — METHADONE HYDROCHLORIDE 5 MG: 10 TABLET ORAL at 20:21

## 2017-01-01 RX ADMIN — PANTOPRAZOLE SODIUM 40 MG: 40 GRANULE, DELAYED RELEASE ORAL at 15:26

## 2017-01-01 RX ADMIN — PANTOPRAZOLE SODIUM 40 MG: 40 GRANULE, DELAYED RELEASE ORAL at 06:16

## 2017-01-01 RX ADMIN — SODIUM CHLORIDE: 9 INJECTION, SOLUTION INTRAVENOUS at 10:51

## 2017-01-01 RX ADMIN — Medication 1000 MG: at 14:19

## 2017-01-01 RX ADMIN — INSULIN LISPRO 1 UNITS: 100 INJECTION, SOLUTION INTRAVENOUS; SUBCUTANEOUS at 00:33

## 2017-01-01 RX ADMIN — Medication 100 MG: at 10:04

## 2017-01-01 RX ADMIN — Medication 1 DROP: at 10:29

## 2017-01-01 RX ADMIN — TAMSULOSIN HYDROCHLORIDE 0.4 MG: 0.4 CAPSULE ORAL at 20:21

## 2017-01-01 RX ADMIN — Medication 1 DROP: at 17:06

## 2017-01-01 RX ADMIN — METHADONE HYDROCHLORIDE 5 MG: 10 TABLET ORAL at 08:21

## 2017-01-01 RX ADMIN — DOXAZOSIN 1 MG: 1 TABLET ORAL at 19:48

## 2017-01-01 RX ADMIN — Medication 1 DROP: at 11:14

## 2017-01-01 RX ADMIN — INSULIN LISPRO 1 UNITS: 100 INJECTION, SOLUTION INTRAVENOUS; SUBCUTANEOUS at 08:25

## 2017-01-01 RX ADMIN — Medication 100 MG: at 20:23

## 2017-01-01 RX ADMIN — SEVOFLURANE 2 %: 250 LIQUID RESPIRATORY (INHALATION) at 10:48

## 2017-01-01 RX ADMIN — Medication 1000 MG: at 12:34

## 2017-01-01 RX ADMIN — Medication 400 MG: at 16:43

## 2017-01-01 RX ADMIN — METHADONE HYDROCHLORIDE 5 MG: 10 TABLET ORAL at 21:43

## 2017-01-01 RX ADMIN — Medication 100 MG: at 09:06

## 2017-01-01 RX ADMIN — Medication 400 MG: at 16:56

## 2017-01-01 RX ADMIN — METOPROLOL TARTRATE 10 MG: 5 INJECTION INTRAVENOUS at 20:34

## 2017-01-01 RX ADMIN — METHADONE HYDROCHLORIDE 5 MG: 10 TABLET ORAL at 10:13

## 2017-01-01 RX ADMIN — ANTACID TABLETS 500 MG: 500 TABLET, CHEWABLE ORAL at 10:20

## 2017-01-01 RX ADMIN — Medication 100 MG: at 10:18

## 2017-01-01 RX ADMIN — Medication 1 DROP: at 09:22

## 2017-01-01 RX ADMIN — METHADONE HYDROCHLORIDE 5 MG: 10 TABLET ORAL at 09:45

## 2017-01-01 RX ADMIN — Medication 100 MG: at 07:50

## 2017-01-01 RX ADMIN — ASPIRIN 81 MG 81 MG: 81 TABLET ORAL at 13:53

## 2017-01-01 RX ADMIN — CITALOPRAM HYDROBROMIDE 10 MG: 10 TABLET ORAL at 08:26

## 2017-01-01 RX ADMIN — INSULIN LISPRO 1 UNITS: 100 INJECTION, SOLUTION INTRAVENOUS; SUBCUTANEOUS at 15:15

## 2017-01-01 RX ADMIN — METOPROLOL TARTRATE 50 MG: 50 TABLET ORAL at 20:23

## 2017-01-01 RX ADMIN — Medication 1000 MG: at 13:53

## 2017-01-01 RX ADMIN — Medication 1 DROP: at 13:09

## 2017-01-01 RX ADMIN — NITROFURANTOIN MONOHYDRATE AND NITROFURANTOIN MACROCRYSTALLINE 100 MG: 75; 25 CAPSULE ORAL at 21:53

## 2017-01-01 RX ADMIN — INSULIN LISPRO 1 UNITS: 100 INJECTION, SOLUTION INTRAVENOUS; SUBCUTANEOUS at 08:23

## 2017-01-01 RX ADMIN — CITALOPRAM HYDROBROMIDE 10 MG: 10 TABLET ORAL at 08:29

## 2017-01-01 RX ADMIN — Medication 1 DROP: at 09:37

## 2017-01-01 RX ADMIN — NITROFURANTOIN MONOHYDRATE AND NITROFURANTOIN MACROCRYSTALLINE 100 MG: 75; 25 CAPSULE ORAL at 22:55

## 2017-01-01 RX ADMIN — INSULIN LISPRO 1 UNITS: 100 INJECTION, SOLUTION INTRAVENOUS; SUBCUTANEOUS at 16:59

## 2017-01-01 RX ADMIN — METHADONE HYDROCHLORIDE 5 MG: 10 TABLET ORAL at 22:16

## 2017-01-01 RX ADMIN — PANTOPRAZOLE SODIUM 40 MG: 40 GRANULE, DELAYED RELEASE ORAL at 17:42

## 2017-01-01 RX ADMIN — METHADONE HYDROCHLORIDE 5 MG: 10 TABLET ORAL at 21:26

## 2017-01-01 RX ADMIN — LISINOPRIL AND HYDROCHLOROTHIAZIDE 1 TABLET: 25; 20 TABLET ORAL at 13:48

## 2017-01-01 RX ADMIN — NITROFURANTOIN MONOHYDRATE AND NITROFURANTOIN MACROCRYSTALLINE 100 MG: 75; 25 CAPSULE ORAL at 20:39

## 2017-01-01 RX ADMIN — METOPROLOL TARTRATE 50 MG: 50 TABLET ORAL at 10:38

## 2017-01-01 RX ADMIN — METFORMIN HYDROCHLORIDE 500 MG: 500 TABLET ORAL at 10:05

## 2017-01-01 RX ADMIN — ATORVASTATIN CALCIUM 10 MG: 10 TABLET, FILM COATED ORAL at 20:39

## 2017-01-01 RX ADMIN — METFORMIN HYDROCHLORIDE 500 MG: 500 TABLET ORAL at 08:25

## 2017-01-01 RX ADMIN — Medication 1 DROP: at 08:28

## 2017-01-01 RX ADMIN — Medication 1 DROP: at 08:51

## 2017-01-01 RX ADMIN — Medication 1 DROP: at 17:32

## 2017-01-01 RX ADMIN — METHADONE HYDROCHLORIDE 5 MG: 10 TABLET ORAL at 09:50

## 2017-01-01 RX ADMIN — PANTOPRAZOLE SODIUM 40 MG: 40 GRANULE, DELAYED RELEASE ORAL at 17:18

## 2017-01-01 RX ADMIN — Medication 1 DROP: at 10:51

## 2017-01-01 RX ADMIN — METOPROLOL TARTRATE 50 MG: 50 TABLET ORAL at 20:38

## 2017-01-01 RX ADMIN — METFORMIN HYDROCHLORIDE 500 MG: 500 TABLET ORAL at 16:57

## 2017-01-01 RX ADMIN — FAMOTIDINE 20 MG: 10 INJECTION, SOLUTION INTRAVENOUS at 10:21

## 2017-01-01 RX ADMIN — Medication 1000 MG: at 15:15

## 2017-01-01 RX ADMIN — NITROFURANTOIN MONOHYDRATE AND NITROFURANTOIN MACROCRYSTALLINE 100 MG: 75; 25 CAPSULE ORAL at 20:16

## 2017-01-01 RX ADMIN — PANTOPRAZOLE SODIUM 40 MG: 40 GRANULE, DELAYED RELEASE ORAL at 06:23

## 2017-01-01 RX ADMIN — BARIUM SULFATE 80 ML: 400 SUSPENSION ORAL at 14:05

## 2017-01-01 RX ADMIN — LISINOPRIL AND HYDROCHLOROTHIAZIDE 1 TABLET: 25; 20 TABLET ORAL at 09:44

## 2017-01-01 RX ADMIN — FAMOTIDINE 20 MG: 10 INJECTION, SOLUTION INTRAVENOUS at 08:37

## 2017-01-01 RX ADMIN — METOPROLOL TARTRATE 50 MG: 50 TABLET ORAL at 08:20

## 2017-01-01 RX ADMIN — INSULIN LISPRO 1 UNITS: 100 INJECTION, SOLUTION INTRAVENOUS; SUBCUTANEOUS at 10:06

## 2017-01-01 RX ADMIN — METOPROLOL TARTRATE 50 MG: 50 TABLET ORAL at 13:13

## 2017-01-01 RX ADMIN — METHADONE HYDROCHLORIDE 5 MG: 10 TABLET ORAL at 20:31

## 2017-01-01 RX ADMIN — ATORVASTATIN CALCIUM 10 MG: 10 TABLET, FILM COATED ORAL at 19:48

## 2017-01-01 RX ADMIN — ATORVASTATIN CALCIUM 20 MG: 20 TABLET, FILM COATED ORAL at 22:51

## 2017-01-01 RX ADMIN — PANTOPRAZOLE SODIUM 40 MG: 40 GRANULE, DELAYED RELEASE ORAL at 06:19

## 2017-01-01 RX ADMIN — CITALOPRAM HYDROBROMIDE 10 MG: 10 TABLET ORAL at 10:21

## 2017-01-01 RX ADMIN — DOXAZOSIN 1 MG: 1 TABLET ORAL at 23:08

## 2017-01-01 RX ADMIN — Medication 1 DROP: at 16:57

## 2017-01-01 RX ADMIN — INSULIN LISPRO 1 UNITS: 100 INJECTION, SOLUTION INTRAVENOUS; SUBCUTANEOUS at 12:34

## 2017-01-01 RX ADMIN — METOPROLOL TARTRATE 50 MG: 50 TABLET ORAL at 14:21

## 2017-01-01 RX ADMIN — FAMOTIDINE 20 MG: 20 TABLET, FILM COATED ORAL at 19:48

## 2017-01-01 RX ADMIN — CITALOPRAM HYDROBROMIDE 10 MG: 10 TABLET ORAL at 10:06

## 2017-01-01 RX ADMIN — METHADONE HYDROCHLORIDE 10 MG: 10 TABLET ORAL at 22:51

## 2017-01-01 RX ADMIN — METOPROLOL TARTRATE 50 MG: 50 TABLET ORAL at 20:12

## 2017-01-01 RX ADMIN — SODIUM CHLORIDE, PRESERVATIVE FREE 10 ML: 5 INJECTION INTRAVENOUS at 02:38

## 2017-01-01 RX ADMIN — METOPROLOL TARTRATE 50 MG: 50 TABLET ORAL at 08:26

## 2017-01-01 RX ADMIN — CIPROFLOXACIN 200 MG: 2 INJECTION INTRAVENOUS at 08:48

## 2017-01-01 RX ADMIN — ATORVASTATIN CALCIUM 10 MG: 10 TABLET, FILM COATED ORAL at 20:15

## 2017-01-01 RX ADMIN — INSULIN LISPRO 1 UNITS: 100 INJECTION, SOLUTION INTRAVENOUS; SUBCUTANEOUS at 12:37

## 2017-01-01 RX ADMIN — ATORVASTATIN CALCIUM 20 MG: 20 TABLET, FILM COATED ORAL at 21:19

## 2017-01-01 RX ADMIN — ATORVASTATIN CALCIUM 10 MG: 10 TABLET, FILM COATED ORAL at 20:23

## 2017-01-01 RX ADMIN — PROPOFOL 30 MG: 10 INJECTION, EMULSION INTRAVENOUS at 11:39

## 2017-01-01 RX ADMIN — FAMOTIDINE 20 MG: 10 INJECTION, SOLUTION INTRAVENOUS at 22:37

## 2017-01-01 RX ADMIN — TAMSULOSIN HYDROCHLORIDE 0.4 MG: 0.4 CAPSULE ORAL at 22:06

## 2017-01-01 RX ADMIN — CITALOPRAM HYDROBROMIDE 10 MG: 10 TABLET ORAL at 10:03

## 2017-01-01 RX ADMIN — CIPROFLOXACIN 200 MG: 2 INJECTION INTRAVENOUS at 08:12

## 2017-01-01 RX ADMIN — LACTOBACILLUS TAB 2 TABLET: TAB at 08:17

## 2017-01-01 RX ADMIN — SODIUM CHLORIDE 1000 ML: 9 INJECTION, SOLUTION INTRAVENOUS at 09:32

## 2017-01-01 RX ADMIN — Medication 1 DROP: at 20:32

## 2017-01-01 RX ADMIN — LISINOPRIL 2.5 MG: 2.5 TABLET ORAL at 09:03

## 2017-01-01 RX ADMIN — ANTACID TABLETS 500 MG: 500 TABLET, CHEWABLE ORAL at 07:52

## 2017-01-01 RX ADMIN — PANTOPRAZOLE SODIUM 40 MG: 40 INJECTION, POWDER, FOR SOLUTION INTRAVENOUS at 14:16

## 2017-01-01 RX ADMIN — TAMSULOSIN HYDROCHLORIDE 0.4 MG: 0.4 CAPSULE ORAL at 20:12

## 2017-01-01 RX ADMIN — INSULIN LISPRO 1 UNITS: 100 INJECTION, SOLUTION INTRAVENOUS; SUBCUTANEOUS at 17:26

## 2017-01-01 RX ADMIN — METHADONE HYDROCHLORIDE 5 MG: 5 TABLET ORAL at 19:48

## 2017-01-01 RX ADMIN — ONDANSETRON 4 MG: 2 INJECTION INTRAMUSCULAR; INTRAVENOUS at 12:38

## 2017-01-01 RX ADMIN — PANTOPRAZOLE SODIUM 40 MG: 40 INJECTION, POWDER, FOR SOLUTION INTRAVENOUS at 12:11

## 2017-01-01 RX ADMIN — PANTOPRAZOLE SODIUM 40 MG: 40 GRANULE, DELAYED RELEASE ORAL at 17:23

## 2017-01-01 RX ADMIN — CITALOPRAM HYDROBROMIDE 10 MG: 10 TABLET ORAL at 08:36

## 2017-01-01 RX ADMIN — NITROFURANTOIN MONOHYDRATE AND NITROFURANTOIN MACROCRYSTALLINE 100 MG: 75; 25 CAPSULE ORAL at 20:40

## 2017-01-01 RX ADMIN — SODIUM CHLORIDE: 9 INJECTION, SOLUTION INTRAVENOUS at 07:55

## 2017-01-01 RX ADMIN — METHADONE HYDROCHLORIDE 5 MG: 10 TABLET ORAL at 21:28

## 2017-01-01 RX ADMIN — FAMOTIDINE 20 MG: 20 TABLET, FILM COATED ORAL at 09:03

## 2017-01-01 RX ADMIN — CIPROFLOXACIN 200 MG: 2 INJECTION INTRAVENOUS at 21:14

## 2017-01-01 RX ADMIN — METOPROLOL TARTRATE 50 MG: 50 TABLET ORAL at 21:44

## 2017-01-01 ASSESSMENT — ENCOUNTER SYMPTOMS
COUGH: 0
DIARRHEA: 0
PHOTOPHOBIA: 0
TROUBLE SWALLOWING: 0
COUGH: 0
ABDOMINAL PAIN: 0
COUGH: 0
EYE REDNESS: 0
RESPIRATORY NEGATIVE: 1
NAUSEA: 0
FACIAL SWELLING: 1
WHEEZING: 0
CHEST TIGHTNESS: 0
SHORTNESS OF BREATH: 0
ABDOMINAL PAIN: 0
VOMITING: 0
COUGH: 0
FACIAL SWELLING: 0
BLOOD IN STOOL: 0
GASTROINTESTINAL NEGATIVE: 1
EYES NEGATIVE: 1
COLOR CHANGE: 0
TROUBLE SWALLOWING: 0
EYES NEGATIVE: 1
NAUSEA: 0
WHEEZING: 0
COUGH: 0
BACK PAIN: 0
CONSTIPATION: 0
COUGH: 0
SHORTNESS OF BREATH: 0
CHOKING: 0
ANAL BLEEDING: 0
STRIDOR: 0
ABDOMINAL PAIN: 0
STRIDOR: 0
CHEST TIGHTNESS: 0
COUGH: 0
EYE PAIN: 0
BACK PAIN: 0
CONSTIPATION: 0
SHORTNESS OF BREATH: 0
ROS SKIN COMMENTS: FACIAL CONTUSION
COUGH: 0
WHEEZING: 0
ABDOMINAL DISTENTION: 0
WHEEZING: 0
SHORTNESS OF BREATH: 0
SINUS PRESSURE: 0
COUGH: 0
SHORTNESS OF BREATH: 0
VOMITING: 0
WHEEZING: 0
GASTROINTESTINAL NEGATIVE: 1
BLOOD IN STOOL: 0
SHORTNESS OF BREATH: 0
SHORTNESS OF BREATH: 0
DIARRHEA: 0
BACK PAIN: 1
NAUSEA: 0
TROUBLE SWALLOWING: 1
CHEST TIGHTNESS: 0
EYE PAIN: 0
CHEST TIGHTNESS: 0
ABDOMINAL PAIN: 0
NAUSEA: 0
VOMITING: 0
RESPIRATORY NEGATIVE: 1
ABDOMINAL PAIN: 0
SORE THROAT: 0
SHORTNESS OF BREATH: 0
WHEEZING: 0
ABDOMINAL PAIN: 0
BLOOD IN STOOL: 0

## 2017-01-01 ASSESSMENT — PAIN SCALES - GENERAL
PAINLEVEL_OUTOF10: 0
PAINLEVEL_OUTOF10: 5
PAINLEVEL_OUTOF10: 0
PAINLEVEL_OUTOF10: 10
PAINLEVEL_OUTOF10: 0
PAINLEVEL_OUTOF10: 9
PAINLEVEL_OUTOF10: 0
PAINLEVEL_OUTOF10: 0
PAINLEVEL_OUTOF10: 10
PAINLEVEL_OUTOF10: 0
PAINLEVEL_OUTOF10: 10
PAINLEVEL_OUTOF10: 0
PAINLEVEL_OUTOF10: 3
PAINLEVEL_OUTOF10: 0
PAINLEVEL_OUTOF10: 4
PAINLEVEL_OUTOF10: 10
PAINLEVEL_OUTOF10: 0
PAINLEVEL_OUTOF10: 8
PAINLEVEL_OUTOF10: 0
PAINLEVEL_OUTOF10: 0
PAINLEVEL_OUTOF10: 7
PAINLEVEL_OUTOF10: 0
PAINLEVEL_OUTOF10: 2
PAINLEVEL_OUTOF10: 0
PAINLEVEL_OUTOF10: 8
PAINLEVEL_OUTOF10: 0
PAINLEVEL_OUTOF10: 5
PAINLEVEL_OUTOF10: 0
PAINLEVEL_OUTOF10: 4
PAINLEVEL_OUTOF10: 10
PAINLEVEL_OUTOF10: 0
PAINLEVEL_OUTOF10: 5
PAINLEVEL_OUTOF10: 0
PAINLEVEL_OUTOF10: 0
PAINLEVEL_OUTOF10: 7
PAINLEVEL_OUTOF10: 3
PAINLEVEL_OUTOF10: 10
PAINLEVEL_OUTOF10: 0
PAINLEVEL_OUTOF10: 0
PAINLEVEL_OUTOF10: 1
PAINLEVEL_OUTOF10: 0
PAINLEVEL_OUTOF10: 0
PAINLEVEL_OUTOF10: 7
PAINLEVEL_OUTOF10: 2
PAINLEVEL_OUTOF10: 0

## 2017-01-01 ASSESSMENT — PAIN SCALES - WONG BAKER
WONGBAKER_NUMERICALRESPONSE: 10
WONGBAKER_NUMERICALRESPONSE: 0

## 2017-01-01 ASSESSMENT — PAIN - FUNCTIONAL ASSESSMENT: PAIN_FUNCTIONAL_ASSESSMENT: 0-10

## 2017-01-01 ASSESSMENT — PAIN DESCRIPTION - PAIN TYPE
TYPE: ACUTE PAIN
TYPE: ACUTE PAIN
TYPE: CHRONIC PAIN

## 2017-01-01 ASSESSMENT — PAIN DESCRIPTION - LOCATION
LOCATION: ABDOMEN
LOCATION: ABDOMEN
LOCATION: NECK

## 2017-01-01 ASSESSMENT — PAIN DESCRIPTION - DESCRIPTORS: DESCRIPTORS: PATIENT UNABLE TO DESCRIBE

## 2017-09-08 PROBLEM — F33.1 MODERATE EPISODE OF RECURRENT MAJOR DEPRESSIVE DISORDER (HCC): Status: ACTIVE | Noted: 2017-01-01

## 2017-09-08 PROBLEM — I61.9 INTRAPARENCHYMAL HEMORRHAGE OF BRAIN (HCC): Status: ACTIVE | Noted: 2017-01-01

## 2017-09-08 PROBLEM — I61.1: Status: ACTIVE | Noted: 2017-01-01

## 2017-09-08 PROBLEM — Z79.899 HIGH RISK MEDICATION USE: Status: ACTIVE | Noted: 2017-01-01

## 2017-09-08 NOTE — PROGRESS NOTES
Dr. Rebecca Jennings called and asked if patient was having diarrhea, told her no, she asked to be taken off the case since patient wasn't having diarrhea.   Electronically signed by Chico Ordaz RN on 9/8/2017 at 4:18 PM

## 2017-09-08 NOTE — PROGRESS NOTES
[] Anxious    [x] Cooperative   [] Isolated   [x] Pleasant       [] Agitated       Recommendations:   Recreation Therapy services offered to all [facility] inpatients:  [x]Recommended: may attempt the rehab support group but he is not certain. Pt said he might like try music therapy. []Not recommended secondary to:     Comments: Pt was alert and more conversational at this time. Pt indicated he likes being called D.L. by name. Pt expressed he does nothing at home. Does not watch tv or go anywhere. He was adamant that he does absolutely nothing. He does indicate he likes country music. Pt became drowsy and closed eyes at this point. Per SLP, pt's granddtr assists him at home. NOTE: per patient, he does not like dogs.      Electronically signed by: Karsten Kennedy  Date: 9/8/2017   Electronically signed by Karsten Kennedy on 9/8/2017 at 4:25 PM

## 2017-09-08 NOTE — PROGRESS NOTES
cards from lowest to highest x 4 suits. Discharge Recommendations:  Continue to assess pending progress     Plan   Plan  Times per week: 5-7 x/wk, 15-90 minutes   Plan weeks: 3-6  Current Treatment Recommendations: Strengthening, ROM, Balance Training, Functional Mobility Training, Endurance Training, Neuromuscular Re-education, Self-Care / ADL, Pain Management, Cognitive Reorientation, Cognitive/Perceptual Training             Goals  Patient Goals   Patient goals :  To get better       Therapy Time   Individual Concurrent Group Co-treatment   Time In 1500         Time Out 1600         Minutes 60           ELIZ Hansen/L   Electronically signed by ELIZ Hansen/L on 9/8/2017 at 4:50 PM

## 2017-09-08 NOTE — PROGRESS NOTES
SPEECH/LANGUAGE PATHOLOGY  BEDSIDE SWALLOWING EVALUATION    PATIENT NAME:  Grace Alves      :  10/28/1929      TODAY'S DATE:  2017  ROOM: [place] [id number]    Time in: 1100  Time out: 1115      [x]The admitting diagnosis and active problem list, as listed below have been reviewed prior to initiation of this evaluation.   Abnormality of gait and mobility [R26.9]  Patient Active Problem List   Diagnosis    Hypertension    Dyslipidemia    Type 2 diabetes mellitus ([address]    Diabetic neuropathy ([address]    Vitamin D deficiency    Osteoarthritis    CVA (cerebrovascular accident) ([address]    Atrial fibrillation ([address]    [facility]    TBI (traumatic brain injury) ([facility]    Traumatic Lt superior frontal lobe hematoma    Abnormality of gait and mobility due to TBI Lt superior frontal lobe hematoma, [facility] admit 17    Aphasia    BPH (benign prostatic hypertrophy)    Chronic back pain    Hyperlipidemia    Pacemaker    Intraparenchymal hemorrhage of brain (HCC)    Lobar cerebral hemorrhage (HCC)    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    Moderate episode of recurrent major depressive disorder (HCC)     No Known Allergies        DYSPHAGIA DIAGNOSIS:   Moderate oropharyngeal dysphagia      DIET RECOMMENDATIONS: mechanical soft (NDD II)/nectar     FEEDING RECOMMENDATIONS:    []No assistance required   []Stand by assist    [x]Full assistance required  []Set up required    [x]Supervision with all PO intake []Up at 90     []Double swallow    []Chin tuck   []Multiple swallow     [x]Small bites/sips      []Alternate solids / liquids  []Check for oral pocketing   [x]No straw    []Throat clear       []Spoon sip liquids   []Effortful swallow       THERAPY RECOMMENDATIONS:   []  Therapy is not recommended     [x]  Therapy is recommended to:    []  Improve oral motor strength and range of motion    []  Improve tongue base retraction     []  Improve laryngeal strength and range of motion []  Mealtime assessment of patient's tolerance of prescribed diet     []  Repeat bedside swallow study in    [x]  Modified Barium Swallow (MBS) is recommended and requires a Physician order    [x] Austin RN notified   [x]  Dr. Kori Tracy notified via voicemail  [x]  OT/PT Departments notified via [facility] :    DIET DYSPHAGIA II MECHANICALLY ALTERED; Carb Control: 4 carbs/meal (approximate 1800 kcals/day);  Dental Soft    CURRENT RESPIRATORY STATUS:   [x]Room Air  []Nasal Canula []O2  Mask  []Non Re-Breather    []Ventilator  []BiPAP  []Tracheostomy with Room Air []Tracheostomy with O2        CURRENT COGNITIVE STATUS:   [x] Alert    [x]Responsive       []Non-responsive   [x]Confused  [] Cooperative     []Uncooperative    []Aphasic       []Impulsive              PROCEDURE   Consistencies Administered During the Evaluation   Liquids: [x]  Thin    [x]  Nectar   []  Honey  Solids:  [x]  Pureed/Pudding   []  Soft Solid   []  Solid     []Moistened Toothette   []Other:      Method of Intake:   [x]  Cup    [x]  Spoon  [x]  Straw    [x]  Self Fed    [x]Set-Up     [x]  Fed by Clinician     Position:   [x]  Upright seated ([x]In bed [] in chair)   []  Reclined upright in bed                     ORAL MECHANISM EXAMINATION  [] Adequate lingual/labial strength  [x] Generalized oral weakness  [] Left labiobuccal weakness   [] Right labiobuccal weakness  [] Left lingual deviation   [] Right lingual deviation  [] Oral apraxia    [] CNT  [] DNT    RESULTS   Oral Stage:   []  WNL []  WFL [x]  Exceptions     [x]  Dentition: [x]  natural  []  missing teeth  []  edentulous  []  partials  [] dentures     []  Inadequate labial seal resulting anterior labial spillage from:   [] right  [] left  [] midline     [x]  Decreased mastication due to:     []  poor/missing dentition  []  decreased lingual control  []  vertical munch    [x]  cognitive function    [x]  Delayed A-P transit due to [] decreased initiation   [x] reduced lingual strength         [x]  cognitive function     []  Oral residuals []  right buccal cavity  []  left buccal cavity []  sub lingually   []  on tongue base   []  throughout oral cavity     []  on superior tongue       []  on palate               []  on velum              []  anterior sulcus    Oral Stage Impression:  Pt presented with mild-moderate oral dysphagia marked by refusal of solid and soft solid foods, delayed A-P transit and decreased mastication of pureed foods d/t limited cognitive function and reduced lingual strength. Pharyngeal Stage:  []  WNL []  WFL      [x]  Exceptions      [x]  Throat clearing present after presentation of:    [x]  thin  []  nectar  []  honey    []  pureed  []  solid    [x]  Immediate wet cough was noted after presentation of:    [x]  thin  []  nectar  []  honey   []  pureed  []  Solid    [x]  Latent wet cough was noted after presentation of :    [x]  thin  [x]  nectar  []  Honey  []  pureed  []  solid    [x]  Wet respirations were noted after presentation of:    [x]  thin  []  nectar  []  honey   []  pureed  []  solid    [x]  Wet/gurgly vocal quality was noted after presentation of:    [x]  thin  [x]  nectar  [] honey   []  pureed  []  solid    [] Multiple swallows were noted after presentation of:    []  thin  []  nectar  []  Honey  []  pureed  [] solid    []  Consistent O2  desaturation after the swallow  [x]  Eye watering/flushing of skin  []  Congested cough throughout evaluation  [] Delayed initiation of the pharyngeal swallow noted  []  Absent swallow    Pharyngeal Stage Impression:  Pt presented with suspected moderate pharyngeal dysphagia characterized by immediate wet cough and throat clearing following the consumption of thin liquids. Pt had latent cough and throat clearing following the presentation of nectar thick liquids.  Pt also exhibited shallow respirations and slight eye watering following the consumption of food and drinks. Recommend MBS to rule in or out presence of aspiration. Long Term Goals:  [] Will tolerate least restrictive diet safely      [x]Goals to be determined after MBS  `    [] No Treatment indicated at this time    Plan of Care:    [] Oral/motor exercises   [] Adduction/voice/pitch exercises   [] Dysphagia exercies    [] Mastication exercises   [] Therapeutic meal/monitoring/feeding  [x] MBS Recommended        [x]  Prognosis for improvements is Poor, at baseline level of functional mobility    Pain:none, N/A       Safety Devices:  [x] Call light within reach [] Chair alarm activated  [] Bed alarm activated    Patient Education:  Treatment goals discussed with [x]  Patient  [x]  family. The [x]  Patient  [x]  family understand the diagnosis, prognosis and plan of care.                  [] Reinforcement needed    Danielle Santos 09/08/17 12:28 PM

## 2017-09-08 NOTE — H&P
incontinence, confusion, dizziness, fatigue, neck pain and vertigo. Pertinent negatives include no abdominal pain, auditory change, aura, chest pain, fever, headaches, light-headedness, nausea, palpitations, shortness of breath or vomiting. UA pending. Condom catheter on. Consult Dr. Eli Duuqe - hold thinners until seen by Dr. Eli Duque. He has right neglect (hihg tone) consider Zanaflex. He has yeast on tongue I prescribed cinnamon oil. The patient has stabilized medically and is able to participate at acute level rehab but is too medically complex for SNF due to need for frequent speech and cognitive retraining 3-5 days a week  's nature of patient's cognitive and side stool by behavioral issues needing rehabilitation psychology and recreational therapy,  's nature patient's medical issues as well as pain management issues and dependence on methadone dosing despite patient's advanced age and recent stroke. Patient likely would benefit from weaning those doses to the minimal dose and a decreasing his risk of falling at home. This will try her strict medical supervision as well as pain management oversight. Imaging:    Imaging and other studies reviewed and discussed with patient and staff    Ct Head Wo Contrast  Result Date: 9/3/2017  EXAMINATION:  CT HEAD WO CONTRAST, CT CERVICAL SPINE WO CONTRAST CLINICAL HISTORY:   fall from standing, facial contusion, passed out Chief complaint? ? Pt fell forward hit face bruising to rt eyelid? Hx of stroke rt side unable to move. COMPARISONS:  NONE AVAILABLE TECHNIQUE:  Spiral axial images of the brain and cervical spine were obtained without contrast enhancement. Multiplanar two-dimensional reformatting was performed. FINDINGS:  Examination the brain is abnormal. There is a 3.2 cm diameter intra-axial hematoma within a region of encephalomalacia in the left parietal lobe, exhibiting virtually no mass effect upon surrounding structures.  There is moderate Oral Nightly Katie Scullin, DO   20 mg at 09/07/17 2251    bisacodyl (DULCOLAX) suppository 10 mg  10 mg Rectal PRN Katie Scullin, DO        citalopram (CELEXA) tablet 10 mg  10 mg Oral Daily Katie Scullin, DO   10 mg at 09/08/17 0838    docusate sodium (COLACE) capsule 100 mg  100 mg Oral BID PRN Katie Scullin, DO        famotidine (PEPCID) tablet 20 mg  20 mg Oral BID Katie Scullin, DO   20 mg at 09/08/17 0839    glucagon (rDNA) injection 1 mg  1 mg Intravenous Once Katie Scullin, DO        guaiFENesin (ROBITUSSIN) 100 MG/5ML oral solution 200 mg  10 mL Oral Q4H PRN Katie Scullin, DO        lisinopril (PRINIVIL;ZESTRIL) tablet 40 mg  40 mg Oral QAM Katie Scullin, DO   40 mg at 09/08/17 0838    magnesium hydroxide (MILK OF MAGNESIA) 400 MG/5ML suspension 30 mL  30 mL Oral PRN Katie Scullin, DO        metFORMIN (GLUCOPHAGE) tablet 500 mg  500 mg Oral BID WC Katie Scullin, DO   500 mg at 09/08/17 2472    methadone (DOLOPHINE) tablet 10 mg  10 mg Oral BID Katie Scullin, DO   10 mg at 09/08/17 0715    metoprolol tartrate (LOPRESSOR) tablet 50 mg  50 mg Oral BID Katie Scullin, DO   50 mg at 09/08/17 2327    fleet rectal enema 1 enema  1 enema Rectal PRN Katie Scullin, DO        glucose (GLUTOSE) 40 % oral gel 15 g  15 g Oral PRN Katie Scullin, DO        dextrose 50 % solution 12.5 g  12.5 g Intravenous PRN Katie Scullin, DO        glucagon (rDNA) injection 1 mg  1 mg Intramuscular PRN Katie Scullin, DO        dextrose 5 % solution  100 mL/hr Intravenous PRN Katie Scullin, DO           No Known Allergies    Social History     Social History    Marital status:      Spouse name: N/A    Number of children: N/A    Years of education: N/A     Occupational History    Not on file.      Social History Main Topics    Smoking status: Former Smoker     Types: Cigarettes    Smokeless tobacco: Never Used    Alcohol use No    Drug use: No    Sexual activity: No     Other disturbance. Negative for agitation, behavioral problems, decreased concentration, dysphoric mood, hallucinations, self-injury and suicidal ideas. The patient is not nervous/anxious and is not hyperactive. All other systems reviewed and are negative. Objective  /66  Pulse 65  Temp 97 °F (36.1 °C) (Oral)   Resp 16  Ht 5' 7\" (1.702 m)  Wt 180 lb (81.6 kg)  SpO2 98%  BMI 28.19 kg/m2*    Physical Exam   Constitutional: He is oriented to person, place, and time. Vital signs are normal. He appears well-developed and well-nourished. Non-toxic appearance. He does not have a sickly appearance. He does not appear ill. No distress. HENT:   Head: Normocephalic and atraumatic. Right Ear: Hearing normal.   Left Ear: Hearing normal.   Nose: Nose normal.   Mouth/Throat: Oropharynx is clear and moist and mucous membranes are normal. No oral lesions. Normal dentition. No oropharyngeal exudate. No lesions   Eyes: Conjunctivae and EOM are normal. Pupils are equal, round, and reactive to light. Right eye exhibits no chemosis, no discharge and no exudate. Left eye exhibits no chemosis, no discharge and no exudate. No scleral icterus. Neck: Normal range of motion. Neck supple. No JVD present. No rigidity. No tracheal deviation and no edema present. No thyromegaly present. Cardiovascular: Intact distal pulses. Exam reveals no decreased pulses. Pulmonary/Chest: Effort normal. No accessory muscle usage. No apnea, no tachypnea and no bradypnea. No respiratory distress. He has decreased breath sounds. He has no wheezes. He has no rales. He exhibits no tenderness. Abdominal: Soft. Bowel sounds are normal. He exhibits no distension and no mass. There is no tenderness. There is no rebound and no guarding. Musculoskeletal: He exhibits tenderness. He exhibits no edema.         Right shoulder: Normal.        Left shoulder: Normal.        Right elbow: Normal.       Left elbow: Normal.        Right wrist: Normal.        Left wrist: Normal.        Right hip: Normal.        Left hip: Normal.        Right knee: Normal.        Left knee: Normal.        Right ankle: Normal. Achilles tendon normal.        Left ankle: Normal. Achilles tendon normal.        Cervical back: Normal.        Thoracic back: Normal.        Lumbar back: He exhibits decreased range of motion, tenderness, bony tenderness and pain. He exhibits no swelling, no edema, no deformity, no laceration and normal pulse. Right upper arm: Normal.        Left upper arm: Normal.        Right forearm: Normal.        Left forearm: Normal.        Right hand: Normal.        Left hand: Normal.        Right upper leg: Normal.        Left upper leg: Normal.        Right lower leg: Normal.        Left lower leg: Normal.        Right foot: Normal.        Left foot: Normal.   Tender areas are indicated by numbered spot         Lymphadenopathy:     He has no cervical adenopathy. He has no axillary adenopathy. Right: No inguinal adenopathy present. Left: No inguinal adenopathy present. Neurological: He is alert and oriented to person, place, and time. He displays abnormal reflex. He displays no atrophy and no tremor. A sensory deficit is present. No cranial nerve deficit. He exhibits normal muscle tone. He has an abnormal Finger-Nose-Finger Test, an abnormal Heel to [employer] and an abnormal Romberg Test. Gait abnormal. Coordination normal. He displays no Babinski's sign on the right side. He displays no Babinski's sign on the left side. Skin: Skin is warm, dry and intact. No abrasion, no bruising, no ecchymosis, no laceration, no petechiae and no rash noted. Rash is not macular, not pustular and not urticarial. He is not diaphoretic. No cyanosis or erythema. No pallor. Nails show no clubbing. Psychiatric: He has a normal mood and affect. His behavior is normal. Judgment and thought content normal. His mood appears not anxious.  His affect functional status remain challengingly complex and patient continues to require intensive therapeutic intervention from multiple therapies, therefore, initiate acute intensive comprehensive inpatient rehabilitation program including PT/OT to improve balance, ambulation, ADLs, and to improve the P/AROM. Functional and medical status reassessed regarding patients ability to participate in therapies and patient found to be able to participate in acute intensive comprehensive inpatient rehabilitation program.  Therapeutic modifications regarding activities in therapies, place, amount of time per day and intensity of therapy made daily. Enroll in acute course of therapy program to include 1 1/2 hours per day of PT 5 to 7 days per week and 1 1/2 hours per day of OT 5 to 7 days per week, and  ST 1/2 hour per day 3-5 days per week . The patient is stable medically and physically on previous exam.       This patient present with significant new onset decreased mobility and inability to perform activities of daily living skills independently and is at significant risk for prolonged disability  For this reason they have been admitted to [facility]. The patient's current functional and medical status are highly complex but the patient is able to participate in intensive rehabilitation. A comprehensive inpatient rehabilitation program is appropriate. The patient will undergo initial evaluation by the rehabilitation team and be discussed at regular treatment team meetings to assess progress, mobility, self care, mood and discharge issues. Physical therapy will be consulted for mobility and endurance issues and should be performed 1 to 2 times per day, 7 days per week for the length of stay. Occupational therapy will be consulted for activities of daily living and should be performed 1 to 2 times per day, 7 days per week for the length of stay.   Their capacity to participate atrial fibrillation, hyperlipidemia - continue blood pressure checks, adjust/add medications (Lipitor, Lisinopril, Lopressor). 3.   Dyslipidemia  4. Type 2 diabetes mellitus   with  Diabetic neuropathy - Continue blood sugar checks, diet, adjust/add medications (Glucophage). 5.   Vitamin D deficiency - supplement and recheck as outpatient. 6.   History of CVA (cerebrovascular accident), with residual Hemiparesis, left -coordinate advanced nuanced rehabilitation with  [facility] accredited acute rehabilitation team  7. Severe cognitive deficits as well as Aphasia - speech therapy consult. 8.   BPH (benign prostatic hypertrophy) - Flomax. Dose Flomax and Flomax at at bedtime and check postvoid residual  9. Chronic back pain dt   Osteoarthritis- K pad Lidoderm prophylactic Tylenol on a scheduled basis patient is artery on high-dose methadone on a regular basis and may not be tolerating that much longer as he ages we will attempt to wean him to a lower dose if tolerated  10. 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 11.   Moderate episode of recurrent major depressive disorder - emotional support, adjust/add medications (Celexa). 12. Right neglect, high tone -  slowly introduce Zanaflex. Monitor liver function test and for over medication  13. Severe thrush Yeast on tongue - I prescribed cinnamon oil. 14. Acute UTI check postvoid residual prescribed Macrobid check urine C AND S      I am especially concerned about Overreliance on long-acting narcotic medication and falls risk    The patient's high risk medication use includes  methadone. The patient is high risk for urinary tract infection, an admission urinalysis has been ordered. I will have the nurses check post void residual bladder volumes and place a catheter if excessive urine is retained in the bladder after voiding.      The patient is risk for deep venous thromosis,complex deep venous thrombosis protocol prophylaxis has been ordered   no blood thinners as patient has intracerebral bleed consult neurology regarding possible blood thinners in the future . The patient is high risk for orthostasis and a hydration program and orthostatic blood pressure screening have been ordered. I will attempt to get old records from the patient's previous hospital stay. Care everywhere on [facility] was utilized. 3.  Current and previous medications were reviewed and summarized and compared to old medication lists from home and from the acute floor. 4.  Complex labs and x-rays were reviewed. I will review patient's old EKG and place it on the chart. 5.  Will provide emotional support for this patient regarding adjustment to their disability. Cognition and mood will be screened daily and addressed by rehabilitation psychology and/or speech therapy as appropriate. I have encouraged the patient to attend the Rehab Adjustment to Disability Support Group and recreational therapy. 6.  Estimated length of stay is 4 weeks. Discharge to home with help from family and home health   PT, OT, RN, aide and . Patient should be independent at discharge. 7.  The patient's medical and rehab prognosis are good. [address] regarding the patient's back up to general medical needs. A welcome letter was presented with an explanation of my services, my specialty and what to expect during the rehabilitation process. As well as introducing myself, I also wrote my name on their bedside marker board with their name as well as the names of the other physicians with an explanation of our individual roles in their care, as well as the rehab process. This note transcribed by Yeison Hill on 09/08/17 at 11:50 a.m.         Herminia Belcher D.O., F.A.A.P.M.&KEVIN

## 2017-09-08 NOTE — PLAN OF CARE
Problem: IP SWALLOWING  Goal: STG - Patient will participate in instrumental assessment of swallowing as appropriate  Outcome: Met This Shift  mbs complete

## 2017-09-08 NOTE — PROGRESS NOTES
Occupational Therapy   Occupational Therapy Initial Assessment  Date: 2017   Patient Name: Aida Dial  MRN: [id number]     : 10/28/1929    Past Medical History:   Diagnosis Date    Abnormality of gait and mobility     Aphasia     Atrial fibrillation (HCC)     BPH (benign prostatic hypertrophy)     Chronic back pain     [facility]     Diabetic neuropathy ([address]     Dyslipidemia     Hemiparesis, left ([address]     Hyperlipidemia     Hypertension     Neuropathy (HCC)     Osteoarthritis     neck, back    Pacemaker     TBI [facility]traumatic brain [facility]) [facility] [facility]     Traumatic Lt superior frontal lobe hematoma     Type II or unspecified type diabetes mellitus without mention of complication, uncontrolled     Vitamin D deficiency      Past Surgical History:   Procedure Laterality Date    CARDIAC PACEMAKER PLACEMENT Left     HERNIA REPAIR                Restrictions  Restrictions/Precautions  Restrictions/Precautions: Fall Risk    Subjective   General  Chart Reviewed: Yes  Family / Caregiver Present: No  Referring Practitioner: Dr. Viri Renteria   Diagnosis: TBI- left superior frontal lobe hematoma s/p fall with impaired mobility   Subjective  Subjective: \"I'm not sure why I am here. \" Pt questionable historian.    Pain Assessment  Patient Currently in Pain: No  Pain Level:  (denies)     Social/Functional History  Social/Functional History  Lives With: Other (comment) (States that grand dtr lives with him)  Type of Home: House  Home Layout: Two level  Home Access: Stairs to enter with rails  Entrance Stairs - Number of Steps: 3  Entrance Stairs - Rails: Both  Bathroom Shower/Tub: Tub/Shower unit  Bathroom Equipment: Grab bars in shower, Shower chair  Home Equipment: Emmit Comber Help From: Family (grand daughter helps PRN)  ADL Assistance: Independent (Pt reported most of the time )  Homemaking Responsibilities: Yes  Meal Prep Responsibility: Primary  Ambulation Assistance: verbal/tactile cues. []  Patient and/or caregiver will demonstrate understanding of recommended HEP for .         [x]  Patient's cognition will improve to safely perform ADLs:  Comprehension: supervised  Expression:  supervised  Social Interaction: Supervised   Problem Solving: SBA  Memory: SBA        Therapy Time   Individual Concurrent Group Co-treatment   Time In 0830         Time Out 0930         Minutes 60               Electronically signed by ELIZ Bill/L on 9/8/2017 at 4:37 PM  Elly Cuadra OTR/L

## 2017-09-08 NOTE — PLAN OF CARE
Problem: IP SWALLOWING  Goal: LTG - patient will tolerate the least restrictive diet consistency to allow for safe consumption of daily meals  Outcome: Ongoing  SLE and BSE completed  Mechanical soft (NDD II)/nectar - rec MBS

## 2017-09-08 NOTE — PLAN OF CARE
Problem: Risk for Impaired Skin Integrity  Goal: Tissue integrity - skin and mucous membranes  Structural intactness and normal physiological function of skin and  mucous membranes.    Outcome: Ongoing    Problem: Falls - Risk of  Goal: Absence of falls  Outcome: Ongoing    Problem: Infection:  Goal: Will remain free from infection  Will remain free from infection  Outcome: Ongoing    Problem: Safety:  Goal: Free from accidental physical injury  Free from accidental physical injury  Outcome: Ongoing  Goal: Free from intentional harm  Free from intentional harm  Outcome: Ongoing    Problem: Daily Care:  Goal: Daily care needs are met  Daily care needs are met  Outcome: Ongoing    Problem: Pain:  Goal: Patients pain/discomfort is manageable  Patients pain/discomfort is manageable  Outcome: Ongoing    Problem: Skin Integrity:  Goal: Skin integrity will stabilize  Skin integrity will stabilize  Outcome: Ongoing

## 2017-09-08 NOTE — PROGRESS NOTES
Speech Language Pathology  SPEECH/LANGUAGE PATHOLOGY  SPEECH/LANGUAGE/COGNITIVE EVALUATION      PATIENT NAME:  Grace Alves      :  10/28/1929      TODAY'S DATE:  2017  ROOM: [place] [id number]     ADMITTING DIAGNOSIS: Abnormality of gait and mobility [R26.9]     ACTIVE PROBLEM LIST:   Patient Active Problem List   Diagnosis    Hypertension    Dyslipidemia    Type 2 diabetes mellitus (HCC)    Diabetic neuropathy ([address]    Vitamin D deficiency    Osteoarthritis    CVA (cerebrovascular accident) ([address]    Atrial fibrillation ([address]    Hemiparesis, left ([address]    TBI (traumatic brain injury) ([address]    Traumatic Lt superior frontal lobe hematoma    Abnormality of gait and mobility due to TBI Lt superior frontal lobe hematoma, [facility] admit 17    Aphasia    BPH (benign prostatic hypertrophy)    Chronic back pain    Hyperlipidemia    Pacemaker    Intraparenchymal hemorrhage of brain (HCC)    Lobar cerebral hemorrhage (HCC)    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    [x]Speech Pathology intervention is recommended with emphasis on the following: cognition, language                  MOTOR SPEECH    Oral Peripheral Examination   []Adequate lingual/labial strength   [x]Generalized oral weakness   []Right labiobuccal weakness   []Left labiobuccal weakness   []Right lingual deviation    []Left lingual deviation   []Inadequate velopharyngeal closure  []Oral apraxia      []CNT    Parameters of Speech Production  Respiration:  [x]WFL []SOB []Inadequate for speech production  Articulation:  [x]WFL []Distortions []Anticipatory struggle []CNT  Resonance:  [x]WFL []Hypernasal  []Hyponasal  []Nasal emission []CNT  Quality:   [x]WFL []Hoarse []Harsh []Strained [] Breathiness []CNT  Pitch:    [x]WFL []High []Low []CNT  Intensity: [x]WFL []Loud []Quiet []CNT  Fluency:  [x]Intact []Dysfluent []CNT  Prosody [x]Intact []Monotone []Irregular fluctuation      RECEPTIVE LANGUAGE    Comprehension of Yes/No Questions:   []WNL    []Incomplete []Latent    [x]Inconsistent   []Perseveration [x]Cueing   []Unable    []CNT      Process  Simple Verbal Commands:   []WNL     [x]Incomplete  []Latent   []Inconsistent   []Perseveration  [x]Cueing      []Unable []CNT    Process Intermediate Verbal Commands:   []WNL        []Incomplete[]Latent           []Inconsistent  []Perseveration    []Cueing      [x]Unable          []CNT    Process Complex Verbal Commands:   []WNL        []Incomplete []Latent           []Inconsistent  []Perseveration    []Cueing       [x]Unable          []CNT    Comprehension of Conversation:     [] WNL        []Incomplete []Latent  [x]Inconsistent    []Perseveration  []Cueing     []Unable []CNT         EXPRESSIVE LANGUAGE   Automatics: [x]Functional [] Impaired  [] Not Tested    Imitation:  Words:         []Functional [x] Impaired  []Not Tested    Sentences:  []Functional [x] Impaired  [] Not Tested       Naming:  Modality used: [x]Verbal        []Written   Confrontation Naming: [x]Functional  [] Impaired  [] Not Tested  Functional Description: [x]Functional [] Impaired   [] Not Tested  Response Naming:       []Functional [x] Impaired   [] Not Tested        Conversation:     [x]Grammatical form: [x] Intact []Disordered [] Not Tested   [x]Paraphasias: []Literal errors [x]Semantic errors [] Neologistic errors       []Running jargon [x]Anomia        COGNITION   Attention/Orientation  Attention: []Sustained attention [x]Easily distracted [] Not Tested  Orientation:  [x]Person  []Place []Date []Reason  for hospitalization    Memory   Biographical:  []Address  [x]Birthdate  [] Age  []Family [] Not Tested  Delayed recall:  []Chair  []Cup  []Grass [x]Did not simple problem solving tasks with mod verbal cues and 75% acc. [x]  Prognosis for improvements is Fair, at baseline level of functional mobility    Pain:none, N/A   []Nursing notified    Safety Devices:  [x] Call light within reach [] Chair alarm activated  [] Bed alarm activated    Patient Education:  Treatment goals discussed with [x]  Patient  [x]  family. The [x]  Patient  [x]  family understand the diagnosis, prognosis and plan of care.                  [] Reinforcement needed      FIM:  Comprehension          Expression   []7 - Independent   []7 - Indpendent   []6 - Modified Independent  []6 - Modified Independent   []5 - Supervision   []5 - Supervision   []4 - Min Assist   []4 - Min Assist   []3 - Mod Assist   []3 - Mod Assist   [x]2 - Max Assist   [x]2 - Max Assist   []1 - Dependent   []1 - Dependent    Problem Solving        Memory   []7 - Independent   []7 - Independent   []6 - Modified Independent  []6 - Modified Independent   []5 - Supervision   []5 - Supervision   []4 - Min Assist   []4 - Min Assist   []3 - Mod Assist   []3 - Mod Assist   [x]2 - Max Assist   [x]2 - Max Assist    []1 - Dependent   [] 1 -Dependent      Rose Marie Bettencourt M.S., CF-SLP

## 2017-09-08 NOTE — PROGRESS NOTES
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 2017    Moderate episode of recurrent major depressive disorder ([address] 2017    Atrial fibrillation (HCC)     Hemiparesis, left (HCC)     TBI (traumatic brain injury) ([address]     Traumatic Lt superior frontal lobe hematoma     Abnormality of gait and mobility due to TBI Lt superior frontal lobe hematoma, [facility] admit 17     Aphasia     BPH (benign prostatic hypertrophy)     Chronic back pain     Hyperlipidemia     Pacemaker     Lobar cerebral hemorrhage ([address] 2017    Intraparenchymal hemorrhage of brain ([address] 2017    Hypertension     Dyslipidemia     Type 2 diabetes mellitus (HCC)     Diabetic neuropathy (HCC)     Vitamin D deficiency     Osteoarthritis     CVA (cerebrovascular accident) ([address]        Past Medical History:   Diagnosis Date    Abnormality of gait and mobility     Aphasia     Atrial fibrillation (HCC)     BPH (benign prostatic hypertrophy)     Chronic back pain     CVA (cerebrovascular accident) ([address]     Diabetic neuropathy ([address]     Dyslipidemia     Hemiparesis, left ([address]     Hyperlipidemia     Hypertension     Neuropathy ([address]     Osteoarthritis     neck, back    Pacemaker     TBI (traumatic brain injury) ([address]     Traumatic Lt superior frontal lobe hematoma     Type II or unspecified type diabetes mellitus without mention of complication, uncontrolled     Vitamin D deficiency      Past Surgical History:   Procedure Laterality Date    CARDIAC PACEMAKER PLACEMENT Left     HERNIA REPAIR         Chart Reviewed: Yes  Family / Caregiver Present: No  Diagnosis: TBI - L superior Frontal Lobe hematoma s/p fall with impaired mobility  General Comment  Comments: Pt up to [facility] - agreeable to PT evaluation (PT time delayed d/t finishing up breakfast)    Restrictions:  Restrictions/Precautions: Fall Risk  Body mass index is 28.19 kg/(m^2). SUBJECTIVE: Subjective: \"Hi! \"    Pre Treatment Pain Screening  Comments / Details: denies    Post Treatment Pain Screening:  Pain Assessment  Pain Level:  (denies)    Prior Level of Function:  Social/Functional History  Lives With: Other (comment) (States that grand dtr lives with him)  Type of Home: House  Additional Comments: Pt is inconsistent historian. Reports that he was completely indpendent with mobility at home. OBJECTIVE:   Vision/Hearing:  Vision: Impaired  Vision Exceptions:  (Noted visuo-spatial neglect, however visual acuity WFL for assessment)  Hearing Exceptions: Hard of hearing/hearing concerns    Cognition:  Overall Orientation Status: Impaired  Orientation Level: Oriented to person  Follows Commands: Impaired (Increased time for processing and hand over hand cues for follow through; decreased attention)  Observation/Palpation  Observation: Pt generally disengaged/lethargic; no acute distress    ROM:  RLE General PROM: ROM limited by increased tone throughout R LE  LLE PROM: WFL    Strength:  Strength Other  Other: Pt unable to follow formal MMT; gross assessment in seated reveals L LE strength 4/5 and generalized weakness throughout R LE d/t neuromotor impairments and increased tone (decreased volitional movement throughout R LE)    Neuro:  Sensation  Overall Sensation Status:  (unable to formally assess)     Balance  Comments: Unable to maintain midline sitting unsupported EOB without Olga to correct. MaxA to maintain balance in standing. Pt lacking spontaneous protective righting reflexes.    Motor Control  Gross Motor?:  (Neglect noted; decreased intrinsic motivation; hypertonic R LE and R UE)    Bed mobility  Bridging: Contact guard assistance;Minimal assistance  Rolling to Left: Minimal assistance  Rolling to Right: Minimal assistance  Supine to Sit: Moderate assistance  Sit to Supine: Dependent/Total  Comment: Impulsive; ataxic; lacking adequate trunk and pelvic strength to lift LEs into bed    Transfers  Sit to Stand: Maximum Assistance  Stand to sit: Maximum Assistance  Bed to Chair: Dependent/Total (+2 assist)  Stand Pivot Transfers: Dependent/Total  Comment: Pt requiring hand over hand assistance to execute tasks; MaxA to stabilize in standing d/t retropulsion and impulsive behavior. Ambulation  Ambulation?: No (Standing only d/t safety concerns. Pt unable to shift adequately for R LE advancement. Able to advance L LE 3\" during transfers with +2 assist)  Stairs/Curb  Stairs?: No (safety concerns)  Wheelchair Activities  All Wheelchair Parts Management: Dependent  Propulsion 1  Propulsion: Manual  Method: LLE;LUE  Level of Assistance: Dependent/Total  Description/ Details: Pt requiring step by step cueing and manual assistance for to locate wheel and propel with L foot. constant cues for management of obstacles and continued participation. 10ft X 2    Activity Tolerance  Activity Tolerance: Fatigued, lethargic        Car transfers: Not tested  Walk 10 ft: Not tested  Walk 50 ft with 2 turns: Not tested  Walk 150 ft in corridor: Not tested  Walking 10 ft on unlevel surface: Not tested  Picking up objects: Not tested  Wheelchair Mobility: No     ASSESSMENT:  Body structures, Functions, Activity limitations: Decreased functional mobility ; Decreased safe awareness;Decreased balance;Decreased coordination;Decreased cognition;Decreased ROM; Decreased strength;Decreased endurance;Decreased vision/visual deficit; Decreased fine motor control  Decision Making: High Complexity  History: High  Exam: High  Clinical Presentation: High    Prognosis: Fair  Patient Education: PT POC; Dc recs; role of PT in rehab  Barriers to Learning: Cognition; decreased attention    CLINICAL IMPRESSION: Pt presenting with above outlined impairments which have negatively impacted his functional mobility status and prevented him from [facility]runing home [facility]. Initiated PT program for balance, mobility and NMR in order to restore physical function and facilitate DC at highest level of indep. D/t the severity of pt's presentation, anticipate that pt will require significant assist upon DC. Will reassess as appropriate, however at this time pt expected to DC at [facility] level.      PLAN OF CARE:  Frequency: 1-2 treatment sessions per day, 5-7 days per week     Current Treatment Recommendations: Strengthening, Transfer Training, Endurance Training, Neuromuscular Re-education, Cognitive Reorientation, Patient/Caregiver Education & Training, Equipment Evaluation, Education, & procurement, Wheelchair Mobility Training, ROM, Balance Training, Functional Mobility Training, Stair training, Gait Training, Home Exercise Program, Safety Education & Training, Cognitive/Perceptual Training    Patient's Goal:  none stated    GOALS:  Short term goals  Short term goal 1: Pt to complet HEP with Olga  Short term goal 2: Pt to complete (6\") reaching tasks in unsupported sitting EOB without LOB X 3min  Short term goal 3: Pt to maintain static standing with LRD and Olga X 1min  Long term goals  Long term goal 1: Pt to complete bed mobility with SBA  Long term goal 2: Pt to complete transfers with CGA  Long term goal 3: Pt to ambulate 25ft with LRD and Olga  Long term goal 4: Pt to manage and propel WC on level surfaces, in protected environments with SBA    ELOS:   Plan weeks: 4-5    Therapy Time:    Individual   Time In 0945   Time Out 1015   Minutes [address] [place], [place], 09/08/17 at 12:38 PM

## 2017-09-08 NOTE — PROCEDURES
70-90°  Viewing Plane(s): LATERAL ONLY  Contrast: commercially prepared, non-standardized barium viscosities; graduated from thin liquid to pudding consistency. Administered via tsp (5 ml boluses), by cup or straw in single and sequentially swallowed boluses as tolerated. Solid evaluated with 1/2 brigido cracker/3 ml barium pudding coated as tolerated. Consistencies Administered During the Evaluation   Liquids: [x]Thin    [x]Nectar   [x]Honey   Solids:  [x]Pureed/Pudding  []Soft Solid   []Brigido cracker   Other:       Method of Intake:     []Self Fed     [x]Set-Up     []Fed by Clinician     [x]Cup      []Spoon     []Straw                    RESULTS   ORAL STAGE []WNL  []WFL  [x]  Exceptions    [] Inadequate labial seal resulting anterior labial spillage from: []right[] left []midline     [x] Decreased mastication due to: []poor/missing dentition [x]decreased lingual control    [x]cognitive status  [x] Delayed A-P transit due to: [x]decreased initiation [x] reduced lingual strength     [x]cognitive function     [x] Decreased bolus formation resulting in premature pharyngeal spillage to the  level of the:  [] valleculae  [x] pyriforms      [x] Oral residuals: [x]anterior sulcus  []sub lingually  []right buccal cavity     []left buccal cavity [x]on tongue base      []throughout oral cavity [x]on superior tongue  []on palate   []on velum        [x]Oral Stage Impression:   Markedly impaired oral phase of swallow characterized by decreased bolus formation resulting in tongue pumping, premature loss of bolus into pharyngeal entry, and oral holding (30 seconds). While pt was holding the bolus orally, it was spilling prematurely into the valleculae. Pt required verbal cues to \"swallow what's in your mouth\". Decreased tongue base retraction to the posterior pharyngeal wall resulted in moderate vallecular and pyriform sinus residuals s/p swallow.  These residuals were unsensed and uncleared independently, though pt was able to clear the residuals with cues for a 2nd dry swallow.        PHARYNGEAL STAGE:     [] WNL  []WFL  [x]  Exceptions    ONSET TIME:  [] Onset time of the pharyngeal swallow was adequate    [x] Delayed initiation of the pharyngeal swallow:    []mild []moderate [x]marked []severe []absent     [x] Swallow reflex was triggered at:   []tongue base []valleculae [x]pyriform sinus     PHARYNGEAL RESIDUALS      Vallecula/Pharyngeal Wall  []No significant residuals were noted in the vallecula    [x]Reduced tongue base retraction resulting in:    [x]residuals in vallecula [x]residual on posterior pharyngeal wall    These residuals were noted for: [x]thin [x]nectar[x] honey [x]pureed []solid  Which: [x]cleared  []did not clear  []partially cleared []mostly cleared  With:    [x]cued []spontaneous [x]double swallow []multiple swallow (  times)  []liquid  chaser    []Residuals in the vallecula were noted due to inadequate epiglottic inversion      Pyriform Sinuses  []No significant residuals were noted in the pyriform sinuses     [x] Residuals in the pyriform sinuses were noted due to:    [x]pharyngeal weakness []cricopharyngeal dysfunction    These residuals were noted for:  [x] thin [x] nectar [x] honey [x] pureed  [] solid  Which: [x] cleared, [] did not clear,  [] partially cleared, [] mostly cleared   With:  [x]cued,  [] spontaneous, [x]double swallow,  [] multiple swallow (  Times),             [] liquid Chaser    LARYNGEAL PENETRATION/ASPIRATION  []Laryngeal penetration was not present during this evaluation    []Aspiration was not present during this evaluation     Stasis -  valleculae Pooling - pyriform sinuses LARYNGEAL    PENETRATION ASPIRATION        Trace   Transient   Deep   Before    During    After   Thin   consistency   liquids x x   x  x    Nectar   consistency   Liquids x x   x      Honey   consistency   liquids x x   x   x   Puree   consistency   x x   x   x       Response to laryngeal penetration:  Response to anomalies were noted    []Inadequate velopharyngeal closure resulting in nasopharyngeal reflux. [] There was presence of Zenkers Diverticulum per Radiologist     []Comments:       CERVICAL ESOPHAGEAL STAGE : [x]Adequate []Inadequate  []Not Assessed     []Cervical osteophytes present per Radiologist     []Structural/mechanical abnormality in cervical esophagus per    Radiologist    [] Redirection of bolus from the esophagus into pharynx     Long Term Goals:  [x] Will tolerate least restrictive diet safely      []Goals to be determined after MBS      [] No Treatment indicated at this time    Short Term Goals:   Pt to be seen  3-5x/week   [x] will improve oral/motor/swallow strength/function   [] will use safe swallow strategies with assist   [x] will improve pharyngeal swallow strength/function   [] will tolerate diet level with no s/s of aspiration. Plan of Care:    [x] Oral/motor exercises  [x] Adduction/voice/pitch exercises   [x]Dysphagia exercies   [x] Mastication exercises   [x] Therapeutic meal/monitoring/feeding    [x]  Prognosis for improvements is: Good,      Pain:none, N/A   []Nursing notified    Radiologist:  Available on Consult as needed, Radiology Tech    Treatment goals were discussed with the: [x] patient  [] family. The []  patient []  family understand the diagnosis, prognosis and plan of care. Thank you for your referral.  Please direct any questions or concerns to Speech Pathology. Images are available through [facility]/PACS. Please see radiologist report for additional details.     Tripp Mcmullen, 09/08/17, 11:58 AM

## 2017-09-08 NOTE — CONSULTS
ferrous sulfate 325 (65 Fe) MG tablet Take 325 mg by mouth daily (with breakfast)   Yes Historical Provider, MD   methadone (DOLOPHINE) 10 MG tablet Take 1 tablet by mouth every 8 hours as needed for Pain. Patient taking differently: Take 10 mg by mouth 2 times daily as needed for Pain  . 2/20/15  Yes [place] Leak, CNP   metFORMIN (GLUCOPHAGE) 500 MG tablet Take 1 tablet by mouth 2 times daily (with meals). 1/15/15  Yes [place] Leak, CNP   acetaminophen (TYLENOL) 325 MG tablet Take 650 mg by mouth every 4 hours as needed for Pain or Fever. 12/26/14  Yes Historical Provider, MD   acetaminophen (TYLENOL) 650 MG suppository Place 650 mg rectally every 4 hours as needed for Fever or Pain. 12/26/14  Yes Historical Provider, MD   aluminum & magnesium hydroxide simethicone 225-200-25 MG/5ML SUSP Take 30 mLs by mouth every 4 hours as needed (for GI distress). 12/26/14  Yes Historical Provider, MD   atorvastatin (LIPITOR) 40 MG tablet Take 20 mg by mouth nightly  12/28/14  Yes Historical Provider, MD   bisacodyl (DULCOLAX) 10 MG suppository Place 10 mg rectally as needed for Constipation (if MOM ineffective). 12/28/14  Yes Historical Provider, MD   famotidine (PEPCID) 20 MG tablet Take 20 mg by mouth 2 times daily. 12/29/14  Yes Historical Provider, MD   Sodium Phosphates (FLEET ENEMA RE) Place 1 Dose rectally as needed (if dulcolax suppository ineffective). 12/26/14  Yes Historical Provider, MD   glucagon (GLUCAGON EMERGENCY) 1 MG injection Infuse 1 kit intravenously once. Yes Historical Provider, MD   glucose (GLUTOSE) 40 % GEL Take 15 g by mouth as needed (for hypoglycemic episode). 12/26/14  Yes Historical Provider, MD   guaiFENesin (ROBITUSSIN) 100 MG/5ML SOLN oral solution Take 10 mLs by mouth every 4 hours as needed for Cough. 12/26/14  Yes Historical Provider, MD   lisinopril (PRINIVIL;ZESTRIL) 40 MG tablet Take 40 mg by mouth every morning.  12/29/14  Yes Historical Provider, MD   magnesium hydroxide (MILK OF MAGNESIA) 400 MG/5ML suspension Take 30 mLs by mouth as needed for Constipation. 12/26/14  Yes Historical Provider, MD   rivaroxaban (XARELTO) 20 MG TABS tablet Take 15 mg by mouth. Give 1 tablet by mouth in the evening for anticoagulant 12/29/14  Yes Historical Provider, MD       Scheduled Meds:   tamsulosin  0.4 mg Oral Nightly    [START ON 9/9/2017] ferrous sulfate  325 mg Oral Dinner    pantoprazole  40 mg Oral Lunch    fish oil  1,000 mg Oral Lunch    magnesium oxide  400 mg Oral Dinner    atorvastatin  20 mg Oral Nightly    citalopram  10 mg Oral Daily    famotidine  20 mg Oral BID    glucagon (rDNA)  1 mg Intravenous Once    lisinopril  40 mg Oral QAM    metFORMIN  500 mg Oral BID WC    methadone  10 mg Oral BID    metoprolol tartrate  50 mg Oral BID     Continuous Infusions:   dextrose       PRN Meds:acetaminophen, acetaminophen, aluminum & magnesium hydroxide-simethicone, bisacodyl, docusate sodium, guaiFENesin, magnesium hydroxide, fleet, glucose, dextrose, glucagon (rDNA), dextrose    No Known Allergies    Social History     Social History    Marital status:      Spouse name: N/A    Number of children: N/A    Years of education: N/A     Occupational History    Not on file. Social History Main Topics    Smoking status: Former Smoker     Types: Cigarettes    Smokeless tobacco: Never Used    Alcohol use No    Drug use: No    Sexual activity: No     Other Topics Concern    Not on file     Social History Narrative    The patient lives alone in a two story home with two steps to enter the home. There is a bedroom and bathroom are on the first floor. His social support includes his granddaughter who lives next door and helps patient daily. He has a cane at home. It is not indicated that he was receiving community services prior to admission. He has history of falls prior to admission. He was independent with mobility with a quad cane prior to admission.   He required assistance for self care prior to admission. His goal is to return home.         Family History   Problem Relation Age of Onset    Family history unknown: Yes       Review Of Systems:   All systems are negative except what is mentioned in the HPI    Physical Exam:  Vitals:    09/07/17 2115 09/08/17 0000 09/08/17 0414 09/08/17 1103   BP: 137/76 (!) 179/87 (!) 168/79 108/66   Pulse: 78 66 62 65   Resp: 18 18 18 16   Temp: 98.6 °F (37 °C) 98.5 °F (36.9 °C) 97 °F (36.1 °C) 97 °F (36.1 °C)   TempSrc: Oral Oral Oral Oral   SpO2: 93% 96% 96% 98%   Weight: 180 lb (81.6 kg)      Height: 5' 7\" (1.702 m)        General Appearance: alert and oriented to person, place and time  Skin: warm and dry, no rash or erythema  Head: normocephalic and atraumatic  Eyes: right eye abrasion, pupils equal, round, and reactive to light, extraocular eye movements intact, conjunctivae normal  ENT: external ear normal bilaterally, nose without deformity   Neck: trachea midline  Pulmonary/Chest: clear to auscultation bilaterally  Cardiovascular: normal rate, irregular rhythm  Abdomen: soft, non-tender, non-distended, normal bowel sounds  Musculoskeletal: right side weakness  Neurologic: [place]    Labs:  Recent Results (from the past 24 hour(s))   POCT Glucose    Collection Time: 09/08/17  6:25 AM   Result Value Ref Range    POC Glucose 93 60 - 115 mg/dl    Performed on ACCU-CHEK    CBC    Collection Time: 09/08/17  7:17 AM   Result Value Ref Range    WBC 6.6 4.8 - 10.8 K/uL    RBC 3.86 (L) 4.70 - 6.10 M/uL    Hemoglobin 11.8 (L) 14.0 - 18.0 g/dL    Hematocrit 36.3 (L) 42.0 - 52.0 %    MCV 94.1 80.0 - 100.0 fL    MCH 30.5 27.0 - 31.3 pg    MCHC 32.4 (L) 33.0 - 37.0 %    RDW 14.5 11.5 - 14.5 %    Platelets 779 (L) 913 - 400 K/uL   Basic Metabolic Panel    Collection Time: 09/08/17  7:17 AM   Result Value Ref Range    Sodium 140 132 - 144 mEq/L    Potassium 4.1 3.5 - 5.1 mEq/L    Chloride 106 98 - 107 mEq/L    CO2 24 22 - 29 mEq/L    Anion Gap 10 7 -

## 2017-09-09 NOTE — PROGRESS NOTES
Devices  Safety Devices in place: Yes  Type of devices: All fall risk precautions in place       Discharge Recommendations:  Continue to assess pending progress     Plan   Plan  Times per week: 5-7 x/wk, 15-90 minutes   Plan weeks: 3-6  Current Treatment Recommendations: Strengthening, ROM, Balance Training, Functional Mobility Training, Endurance Training, Neuromuscular Re-education, Self-Care / ADL, Pain Management, Cognitive Reorientation, Cognitive/Perceptual Training  G-Code     OutComes Score                                                    Goals  Patient Goals   Patient goals :  To get better       Therapy Time   Individual Concurrent Group Co-treatment   Time In 1430         Time Out 1530         Minutes [facility]    Electronically signed by MANISHA Kilgore on 9/9/2017 at 2:56 PM

## 2017-09-09 NOTE — PROGRESS NOTES
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 2017    Moderate episode of recurrent major depressive disorder ([facility] 2017    Atrial fibrillation (HCC)     Hemiparesis, left (HCC)     TBI (traumatic brain injury) ([facility]     Traumatic Lt superior frontal lobe hematoma     Abnormality of gait and mobility due to TBI Lt superior frontal lobe hematoma, [facility] admit 17     Aphasia     BPH (benign prostatic hypertrophy)     Chronic back pain     Hyperlipidemia     Pacemaker     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 Gerardo Gonzalez   Diagnosis: TBI- left superior frontal lobe hematoma s/p fall with impaired mobility   Subjective  Subjective: \"I'm not sure why I am here. \" Pt questionable historian. Pre Treatment Pain Screening  Pain at present: 0  Scale Used: Numeric Score  Pain Assessment  Patient Currently in Pain: No  Pain Level: 0  Vital Signs  Patient Currently in Pain: No   Orientation     Objective     Client completed morning ADLs. Did not want to shower. Min a for lower body dressing and for transfer from eob to . Able to complete grooming with setup. Pt tolerated treatment well. No UB dressing d/t pic line.                        Assessment      Activity Tolerance  Activity Tolerance: Patient Tolerated treatment well  Safety Devices  Safety Devices in place: Yes  Type of devices: All fall risk precautions in place       Discharge Recommendations:  Continue to assess pending progress     Plan   Plan  Times per week: 5-7 x/wk, 15-90 minutes   Plan weeks: 3-6  Current Treatment Recommendations: Strengthening, ROM, Balance Training, Functional Mobility Training, Endurance Training, Neuromuscular Re-education, Self-Care / ADL, Pain Management, Cognitive Reorientation, Cognitive/Perceptual Training  G-Code     OutComes Score                                                    Goals  Patient Goals   Patient goals :  To get better       Therapy Time   Individual Concurrent Group Co-treatment   Time In [facility]         Time [address] Jody Watts    Electronically signed by MANISHA Valdes on 9/9/2017 at 10:45 AM

## 2017-09-09 NOTE — PROGRESS NOTES
[facility]  Speech Language/Pathology  Dysphagia Note    Macy Young  9/9/2017    Time in: 1350  Time out: 1405      Pt being seen for : [x] Dysphagia exercises  [] Oral Motor Exercises             [] Therapeutic meal monitor  [] Other:    Subjective:  Behavior: [] Alert  [] Cooperative  []  Pleasant  [] Confused  [] Agitated                                          [] Uncooperative  [] Distractible [] Motivated  [] Self-Limiting [] Anxious         [x] Other:In/out of sleep. Endurance:  [] Adequate for participation in SLP sessions  [x] Reduced overall              [] Lethargic  [] Other:      Objective/Assessment:   Patient attempted Lingual elevation with min success: 2/6. He required max cues to attempt swallowing exercises and   was minimally successful. Mouth care completed. Spoke with  Ra TOM regarding patient's lethargy. [x] Pt demonstrated no s/s of pain during this visit. none  N/A  [] Nursing notified    Safety Devices:  [] Call light within reach [] Chair alarm activated         [] Bed alarm activated    Plan:  [] Continue as per plan of care  [] Prepare for Discharge   [] Discharge      Patient/Caregiver Education:  Treatment goals discussed with []  Patient  []  family. The []  Patient  []  family understand the diagnosis, prognosis and plan of care.     Signature:  Nicolette Cheatham MA, CCC-SLP

## 2017-09-09 NOTE — PROGRESS NOTES
Physical Therapy  Facility/Department: [facility] Daily Treatment Note    NAME: Horacio Qureshi    : 10/28/1929 (86 y.o.)  MRN: [id number]    Account: [de-identified]  Gender: male    Date of Service: 17      SUBJECTIVE: Patient in bed, agreeable to therapy. Denies pain pre and post treatment. Patient's [facility] present for session. OBJECTIVE:    Bed Mobility:   Rolling: Contact Guard Assist rolling right, Max assist rolling left  Supine to sit: Maximal Assist  Sit to Supine: Contact Guard Assist  Uses momentum sit to supine, patient given verbal cues for technique with fair carryover. Mat Mobility and Bed Mobility x2 each      Transfers: +2 for safety    wc to mat x2 as well as wc to bed x2  Sit to stand: Moderate Assist  Stand to sit: Moderate Assist  Chair to/from bed: Maximal Assist  Car Transfer: Not tested  Safety concerns    Gait/Ambulation: Stood only x10 seconds    Exercise: The below exercises were completed to facilitate strength, motor control   and muscular endurance. Quad sets x10       PLAN:   Continue per POC      Comments:   Quality of wc to mat and bed transfer improved with repitition. After session, patient returned to room, as he needed to be cleaned up and changed. Patient becomes SOB and wheezy after activity. SPO2 93% immedietly after activity. Improves to 97% with rest. Nursing notified. Patient very fatigued after PT session. Falls Safety Armband Present: [x] Yes  [] No    Safety/Judgment:   Poor awareness of when to sit during transfers, needs cues and assist.                 Minutes:30      Transfer/Bed mobility trainin      Gait trainin      Neuro re education:0      Therapeutic ex: 2         Therapy Time:    Individual   Time In 1130   Time Out 1200   Minutes 30       Electronically signed by Subhash Prabhakar PTA on 2017 at 12:52 PM

## 2017-09-09 NOTE — PROGRESS NOTES
non-tender, no enlargement of liver or spleen. Extremities:  No significant lower extremity edema or tenderness. Skin:   Intact to general survey, no visualized or palpated problems. Rehabilitation:  Physical therapy: FIMS:  Bed Mobility:      Transfers: Sit to Stand: Maximum Assistance  Stand to sit: Maximum Assistance  Bed to Chair: Dependent/Total (+2 assist)  Stand Pivot Transfers: Dependent/Total,  ,      FIMS: Bed, Chair, Wheel Chair: 3 - Requires 25-49% assistance to transfer  Walk: 1- Total Assistance (Subject less than 25%)  Distance Walked: 1ft  Wheel Chair: 1- Total Assistance (Subject less than 25%)  Distance Traveled in Wheel Chair: 10ft  Stairs: 0 - Did not occur,  , Assessment: Pt presenting with above outlined impairments which have negatively impacted his functional mobility status and prevented him from retruning home at [facility]. Initiated PT program for balance, mobility and NMR in order to restore physical function and facilitate DC at [facility] level of [facility]. D/t the severity of pt's presentation, anticipate that pt will require significant assist upon DC. Will reassess as appropriate, however at this time pt expected to DC at [facility] level.      Occupational therapy: FIMS:  Eatin - Feeds self with setup/supervision/cues and/or requires only setup/supervision/cues to perform tube feedings  Groomin - Able to perform 3-4 tasks with touching help  Bathin - Did not occur  Dressing-Upper: 4 - Requires assist with buttons/zippers only and/or requires assist with one arm only  Dressing-Lower: 4 - Requires assist with buttons/zippers/shoelaces and/or assist with shoes only  Toileting: 3 - Able to perform 2 tasks  Toilet Transfer: 3 - Requires 25-49% assist getting off toilet  Shower Transfer: 0 - Activity does not occur (pt declined ),  ,      Speech therapy: FIMS: Comprehension: 4 - Patient understands basic needs 75-90%+ of the time  Expression: 4 - Expresses basic ideas/needs 75-90%+ of ENCEPHALOMALACIA THAT REPRESENTS A REMOTE INFARCT. THERE ARE ADVANCED AGE-RELATED FINDINGS. THERE IS DEGENERATIVE DISEASE IN THE CERVICAL SPINE, WITHOUT EVIDENCE  OF FRACTURE OR TRAUMATIC SUBLUXATION. All CT scans at this facility use dose modulation, iterative reconstruction, and/or weight based dosing when appropriate to reduce radiation dose to as low as reasonably achievable. Xr Chest Portable    Result Date: 9/3/2017  EXAMINATION: XR CHEST PORTABLE CLINICAL HISTORY: Fall last night. Right eye injury. COMPARISONS: None available. FINDINGS: Single AP portable view the chest is obtained on September 3, 2017 at 1209 hours. The heart is not enlarged. The mediastinum is not widened. There is no acute process in the lungs. There is granulomatous calcification in AP window. There is an unremarkable single lead permanent pacemaker in place in the left. The chest wall is unremarkable. CONCLUSION: NO ACUTE PROCESS       Previous extensive, complex labs, notes and diagnostics reviewed and analyzed. ALLERGIES:    Allergies as of 09/07/2017    (No Known Allergies)      (please also verify by checking [facility])     Complex Physical Medicine & Rehab Issues Assess & Plan:   1. Severe abnormality of gait and mobility and impaired self-care and ADL's secondary to Severe cognitive deficits right hemiparesis aphasia dysphagia secondary to left superior frontal lobe hematoma . Functional and medical status reassessed regarding patients ability to participate in therapies and patient found to be able to participate in acute intensive comprehensive inpatient rehabilitation program including PT/OT to improve balance, ambulation, ADLs, and to improve the P/AROM. Therapeutic modifications regarding activities in therapies, place, amount of time per day and intensity of therapy made daily.   In bed therapies or bedside therapies prn.   2. Bowel and Bladder dysfunction:  frequent toileting, ambulate to bathroom with assistance,

## 2017-09-10 NOTE — CONSULTS
Consults      Consult Dictated. Impression: Dysphagia s/p brain injury-cva. Recommend: Agree with placement of a PEG. Which will be scheduled in the next 24-48         Hs.

## 2017-09-10 NOTE — PROGRESS NOTES
Subjective: The patient complains of severe  acute  right hemiparesis confusion Global aphasia secondary to recent TBI and left superior frontal lobe hemorrhage relieved by  rest PT OT speech therapy and exacerbated by  severe pain and high tone in his right upper and lower extremity. He is getting IV fluids until he can get his plate placed early next week. His power of  is also in agreement with the PEG tube placement. I transitioned him from saline to PPN. I will hold his Glucophage until he is able to take by mouth. He will come off his Cipro and I'll recheck his urine because completed a 3 day course. ROS x10: The patient also complains of severely impaired mobility and activities of daily living. Otherwise no new problems with vision, hearing, nose, mouth, throat, dermal, cardiovascular, GI, , pulmonary, musculoskeletal, psychiatric or neurological. See Rehab H&P on Rehab chart dated . Vital signs:  BP (!) 178/83 Comment: notified rn   Pulse 65  Temp 97 °F (36.1 °C) (Oral)   Resp 17  Ht 5' 7\" (1.702 m)  Wt 180 lb (81.6 kg)  SpO2 94%  BMI 28.19 kg/m2  I/O:   PO/Intake:   Nothing by mouth due to profound dysphagia    Bowel/Bladder:  continent, no problems noted. General:  Patient is well developed, adequately nourished, non-obese and     well kempt. HEENT:    Severe thrush PERRLA, hearing intact to loud voice, external inspection of ear     and nose benign. Inspection of lips, tongue and gums benign  Musculoskeletal: No significant change in strength or tone. All joints stable. Inspection and palpation of digits and nails show no clubbing,       cyanosis or inflammatory conditions. Neuro/Psychiatric: Affect: flat but pleasant. Alert and oriented to person, place and     situation. No significant change in deep tendon reflexes or     sensation  Lungs:  Diminished CTA-B. Respiration effort is normal at rest.     Heart:   S1 = S2, RRR.   No loud deficits right hemiparesis aphasia dysphagia secondary to left superior frontal lobe hematoma . Functional and medical status reassessed regarding patients ability to participate in therapies and patient found to be able to participate in acute intensive comprehensive inpatient rehabilitation program including PT/OT to improve balance, ambulation, ADLs, and to improve the P/AROM. Therapeutic modifications regarding activities in therapies, place, amount of time per day and intensity of therapy made daily. In bed therapies or bedside therapies prn.   2. Bowel and Bladder dysfunction:  frequent toileting, ambulate to bathroom with assistance, check post void residuals. Check for C.difficile x1 if >2 loose stools in 24 hours, continue bowel & bladder program.  Monitor bowel and bladder function. Lactinex 2 PO every AC. MOM prn, Brown Bomb prn, Glycerin suppository prn, enema prn.  3. Severe low back pain generalized OA pain: reassess pain every shift and prior to and after each therapy session, give prn Tylenol and  scheduled methadone which she's been on long-term prescription monitoring reviewed doses adjusted because of neurologic injury and advanced age, modalities prn in therapy, Lidoderm, K-pad prn.   4. Skin healing and breakdown risk:  continue pressure relief program.  Daily skin exams and reports from nursing. 5. Fatigue due to nutritional and hydration deficiency:  continue to monitor I&Os, calorie counts prn, dietary consult prn.  6. Acute episodic insomnia with situational adjustment disorder:  prn Ambien, monitor for day time sedation. 7. Falls risk elevated:  patient to use call light to get nursing assistance to get up, bed and chair alarm. 8. Elevated DVT risk: progressive activities in PT, continue prophylaxis IHSAN hose, elevation and  no blood thinners because of recent bleed in the brain . 9.  Complex discharge planning:  Weekly team meeting every Monday to assess progress towards goals, tone -   I plan to slowly introduce Zanaflex. Monitor liver function test and for over medication-I will hold Zanaflex until he is able to give his PEG tube in  12. Severe thrush Yeast on tongue - I prescribed cinnamon oil. 13. Acute UTI check postvoid residual prescribed Macrobid check urine C AND S  14. Severe oral pharyngeal dysphasia patient felt his modified barium swallow on 9/8/17. He is now nothing by mouth pending a PEG tube Dr. Micah Castellanos consult dated I will also treat his thrush and prescribed IV fluids and   PPN and, until he is able to participate to been.              Donis Samayoa D.O., PM&R     Attending    [address]

## 2017-09-11 NOTE — PROGRESS NOTES
estimated enerry/ protein needs  · Anthropometric Measures:  · Ht: 5' 7\" (170.2 cm)   · Current Body Wt: 180 lb (81.6 kg)  · Admission Body Wt: 180 lb (81.6 kg)  · Usual Body Wt: 186 lb (84.4 kg) (9/7/17)  · % Weight Change: 6# (3.2%) ,  ~ 1 week  · Ideal Body Wt: 142 lb (64.4 kg), % Ideal Body 127%  · BMI Classification: BMI 25.0 - 29.9 Overweight (28.3)  · Comparative Standards (Estimated Nutrition Needs):  · Estimated Daily Total Kcal: 5255-1016  · Estimated Daily Protein (g): 65-82  · Estimated Daily Total Fluid (ml/day): ~1900    Estimated Intake vs Estimated Needs: Intake Meets Needs (with PPN)    Nutrition Risk Level: High    Nutrition Interventions:   Continue NPO, Start Tube Feeding, Discontinue Parenteral Nutrition (Recommend Diabetic Formula ( GLucerna 1.2) @ 25 ml/hr to increase to a goal rate of 60 ml/hr. D/C PPN and begin 100 ml water flush  6 x daily. Once tolerated, can transition to bolus feeds  360 ml , 4 x daily)  Continued Inpatient Monitoring, Education not appropriate at this time    Nutrition Evaluation:   · Evaluation: Goals set   · Goals: EN to deliver > 85% estimated needs, wt maintained ~ 180#, gluc < 140   · Monitoring: NPO Status, TF Intake, PN Intake, TF Tolerance, Gastric Residuals, Weight, Pertinent Labs    See Adult Nutrition Doc Flowsheet for more detail.      Electronically signed by Juan Francisco Barker RD, LD on 9/11/17 at 4:12 PM

## 2017-09-11 NOTE — FLOWSHEET NOTE
Assumed care of pt at 1900 tonight. Pt is receiving PPN and lipids at this time. Infusing into left arm IV without difficulty. Pt is irritable this carlita. Yelling out and being uncooperative at times. Pt has not tried to get out of bed. Pt is NPO with meds in applesauce but will be NPO after midnight for possible G-tube placement tomorrow by Dr. Kendell Bamberger. Pt is incontinent of urine, wears a depends. Pt complains of being hungry. Pt needs consent signed by Alisia Talley. Bed alarm on. . Call  Ellen Nieto in reach.  NOY BO

## 2017-09-11 NOTE — CONSULTS
[address] [facility] [address] [facility]                                 CONSULTATION    PATIENT NAME: Gricelda Kelly                     :             10/28/1929  MED REC NO:   [id number]                             ROOM:            R255  ACCOUNT NO:   [de-identified]                            ADMISSION DATE:  2017  PROVIDER:     Ashutosh Rocha MD    DATE OF CONSULTATION:  09/10/2017    HISTORY OF PRESENT ILLNESS:  This is an 49-year-old male patient seen  in consultation for placement of a gastrostomy tube for feeding  purpose. Apparently, this patient failed the swallow test and  evaluation yesterday. Otherwise, the patient has a history of a  superior frontal lobe hematoma, status post injury to falling at home,  for which the patient was transferred for [facility].  In view of  the hematoma remaining stable, the patient was transferred over here  and the course was complicated for a stroke, hyperactive delirium,  which resolved on . The patient followed simple command, but has  a new disorder and difficulty with eating, which the patient failed  the swallow test.  Otherwise, the patient is alert and stable. PAST MEDICAL HISTORY:  The patient has a history of hypertension,  dyslipidemia, diabetes mellitus type 2, vitamin D deficiency,  osteoarthritis, the patient had atrial fibrillation, left hemiparesis,  as I mentioned before, had traumatic brain injury, the patient has a  benign prostatic hypertrophy, chronic back pain, hyperlipidemia, also  has a major depressive disorder. PAST SURGICAL HISTORY:  The patient has a hernia repair as well as a  cardiac pacemaker placement. HOME MEDICATIONS:  The patient was taking fish oil, Flomax, Celexa,  Lopressor, mag oxide, Protonix, _____, Dolophine. Ferrous sulfate,  was on Glucophage, on Tylenol, Lipitor, Pepcid, also was on Zestril  and Xarelto.     ALLERGIES:  The patient has no

## 2017-09-11 NOTE — PROGRESS NOTES
Recommendations:  Continue to assess pending progress     Plan   Plan  Times per week: 5-7 x/wk, 15-90 minutes   Plan weeks: 3-6  Current Treatment Recommendations: Strengthening, ROM, Balance Training, Functional Mobility Training, Endurance Training, Neuromuscular Re-education, Self-Care / ADL, Pain Management, Cognitive Reorientation, Cognitive/Perceptual Training  Goals  Patient Goals   Patient goals :  To get better       Therapy Time   Individual Concurrent Group Co-treatment   Time In     1000     Time Out     [address] [place] [place], MANISHA    Electronically signed by MANISHA Sterling on 9/11/2017 at 4:19 PM

## 2017-09-11 NOTE — PROGRESS NOTES
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 He is getting IV fluids until he can get his plate placed early next week. His power of  is also in agreement with the PEG tube placement. I transitioned him from saline to PPN. I will hold his Glucophage until he is able to take by mouth. He will come off his Cipro and I'll recheck his urine because completed a 3 day course. He is occasionally combative. Likely secondary to all the commotion regarding him getting his PEG tube today. Hopefully once his medications get back on board he is able to eat he'll be less combative. He went for the patient today I was instructed to med discharge report readmit to the surgical department could accesses charted medications. I Went ahead and did that. ROS x10: The patient also complains of severely impaired mobility and activities of daily living. Otherwise no new problems with vision, hearing, nose, mouth, throat, dermal, cardiovascular, GI, , pulmonary, musculoskeletal, psychiatric or neurological. See Rehab H&P on Rehab chart dated . Vital signs:  BP (!) 172/69  Pulse 65  Temp 98.2 °F (36.8 °C) (Oral)   Resp 20  Ht 5' 7\" (1.702 m)  Wt 180 lb (81.6 kg)  SpO2 95%  BMI 28.19 kg/m2  I/O:   PO/Intake:   Nothing by mouth due to profound dysphagia    Bowel/Bladder:  continent, no problems noted. General:  Patient is well developed, adequately nourished, non-obese and     well kempt. HEENT:    Severe thrush PERRLA, hearing intact to loud voice, external inspection of ear     and nose benign. Inspection of lips, tongue and gums benign  Musculoskeletal: No significant change in strength or tone. All joints stable.       Inspection and palpation of digits and nails show no clubbing,       cyanosis or inflammatory conditions. Neuro/Psychiatric: Affect: flat but pleasant. Alert and oriented to person, place and     situation. No significant change in deep tendon reflexes or     sensation  Lungs:  Diminished CTA-B. Respiration effort is normal at rest.     Heart:   S1 = S2, RRR. No loud murmurs. Abdomen:  Soft, non-tender, no enlargement of liver or spleen. Extremities:  No significant lower extremity edema or tenderness. Skin:   Intact to general survey, no visualized or palpated problems. Rehabilitation:  Physical therapy: FIMS:  Bed Mobility:      Transfers: Sit to Stand: Maximum Assistance  Stand to sit: Maximum Assistance  Bed to Chair: Dependent/Total (+2 assist)  Stand Pivot Transfers: Dependent/Total,  ,      FIMS: Bed, Chair, Wheel Chair: 1 - Requires >75% assitance to transfer  Walk: 1- Total Assistance (Subject less than 25%)  Distance Walked: 1ft  Wheel Chair: 1- Total Assistance (Subject less than 25%)  Distance Traveled in Wheel Chair: 10ft  Stairs: 0 - Did not occur,  , Assessment: Pt presenting with above outlined impairments which have negatively impacted his functional mobility status and prevented him from retruning home at [facility]. Initiated PT program for balance, mobility and NMR in order to restore physical function and facilitate DC at [facility] level of [facility]. D/t the severity of pt's presentation, anticipate that pt will require significant assist upon DC. Will reassess as appropriate, however at this time pt expected to DC at [facility] level. Occupational therapy: FIMS:  Eatin - Staff performs feeding for patient or patient requires IV fluids for hydration/TPN/PPN (NPO. Has tpn running)  Grooming:  (Activity did not occur)  Bathing:  (Refused. Activity did not occur)  Dressing-Upper:  (Wearing a gown.  Activity did not occur)  Dressing-Lower:  (Activity did not occur)  Toiletin - Total assist  Toilet Transfer: 3 - Requires 25-49% assist getting off toilet  Shower Transfer: 0 - Activity does not occur,  ,      Speech therapy: FIMS: Comprehension: 4 - Patient understands basic needs 75-90%+ of the time  Expression: 2 - Expresses basic ideas/needs 25-49% of the time  Social Interaction: 3 - Patient appropriate  50%-74% of the time  Problem Solving: 3 - Patient solves simple/routine tasks 50%-74%  Memory: 3 - Patient remembers 50%-74% of the time      Lab/X-ray studies reviewed, analyzed and discussed with patient and staff:   Recent Results (from the past 24 hour(s))   POCT Glucose    Collection Time: 09/10/17 11:58 AM   Result Value Ref Range    POC Glucose 107 60 - 115 mg/dl    Performed on ACCU-CHEK    POCT Glucose    Collection Time: 09/10/17  5:17 PM   Result Value Ref Range    POC Glucose 146 (H) 60 - 115 mg/dl    Performed on ACCU-CHEK    POCT Glucose    Collection Time: 09/10/17  9:42 PM   Result Value Ref Range    POC Glucose 169 (H) 60 - 115 mg/dl    Performed on ACCU-CHEK    Urinalysis Reflex to Culture    Collection Time: 09/11/17  3:15 AM   Result Value Ref Range    Color, UA Yellow Straw/Yellow    Clarity, UA Clear Clear    Glucose, Ur Negative Negative mg/dL    Bilirubin Urine Negative Negative    Ketones, Urine Negative Negative mg/dL    Specific Gravity, UA 1.008 1.005 - 1.030    Blood, Urine Negative Negative    pH, UA 6.0 5.0 - 9.0    Protein, UA Negative Negative mg/dL    Urobilinogen, Urine 1.0 <2.0 E.U./dL    Nitrite, Urine Negative Negative    Leukocyte Esterase, Urine Negative Negative    Urine Reflex to Culture Not Indicated    POCT Glucose    Collection Time: 09/11/17  6:13 AM   Result Value Ref Range    POC Glucose 181 (H) 60 - 115 mg/dl    Performed on ACCU-CHEK        Ct Cervical Spine Wo Contrast  Result Date: 9/3/2017   3.2 CM INTRA-AXIAL ACUTE HEMATOMA IN THE LEFT PARIETAL LOBE, WITHIN A REGION OF ENCEPHALOMALACIA THAT REPRESENTS A REMOTE INFARCT. THERE ARE ADVANCED AGE-RELATED FINDINGS.  THERE IS DEGENERATIVE DISEASE IN THE CERVICAL SPINE, WITHOUT EVIDENCE  OF FRACTURE OR TRAUMATIC SUBLUXATION. All CT scans at this facility use dose modulation, iterative reconstruction, and/or weight based dosing when appropriate to reduce radiation dose to as low as reasonably achievable. Xr Chest Portable   Result Date: 9/3/2017   CONCLUSION: NO ACUTE PROCESS       Previous extensive, complex labs, notes and diagnostics reviewed and analyzed. ALLERGIES:    Allergies as of 09/07/2017    (No Known Allergies)      (please also verify by checking MAR)    Today I evaluated this patient for periodic reassessment of medical and functional status. The patient was discussed in detail at the treatment team meeting focusing on current medical issues, progress in therapies, social issues, psychological issues, barriers to progress and strategies to address these barriers, and discharge planning. See the hand written addendum to rehab progress note. The patient continues to be high risk for future disability and their medical and rehabilitation prognosis continue to be good and therefore, we will continue the patient's rehabilitation course as planned. The patient's tentative discharge date was set. Patient and family education was discussed. The patient was made aware of the team discussion regarding their progress. Complex Physical Medicine & Rehab Issues Assess & Plan:   1. Severe abnormality of gait and mobility and impaired self-care and ADL's secondary to Severe cognitive deficits right hemiparesis aphasia dysphagia secondary to left superior frontal lobe hematoma . Functional and medical status reassessed regarding patients ability to participate in therapies and patient found to be able to participate in acute intensive comprehensive inpatient rehabilitation program including PT/OT to improve balance, ambulation, ADLs, and to improve the P/AROM.   Therapeutic modifications regarding activities in therapies, place, amount of time per day and intensity of therapy made daily. In bed therapies or bedside therapies prn.   2. Bowel and Bladder dysfunction:  frequent toileting, ambulate to bathroom with assistance, check post void residuals. Check for C.difficile x1 if >2 loose stools in 24 hours, continue bowel & bladder program.  Monitor bowel and bladder function. Lactinex 2 PO every AC. MOM prn, Brown Bomb prn, Glycerin suppository prn, enema prn.  3. Severe low back pain generalized OA pain: reassess pain every shift and prior to and after each therapy session, give prn Tylenol and  scheduled methadone which she's been on long-term prescription monitoring reviewed doses adjusted because of neurologic injury and advanced age, modalities prn in therapy, Lidoderm, K-pad prn.   4. Skin healing and breakdown risk:  continue pressure relief program.  Daily skin exams and reports from nursing. 5. Fatigue due to nutritional and hydration deficiency:  continue to monitor I&Os, calorie counts prn, dietary consult prn. IV fluids until patient able to use the PEG tube he is currently nothing by mouth  6. Acute episodic insomnia with situational adjustment disorder:  prn Ambien, monitor for day time sedation. 7. Falls risk elevated:  patient to use call light to get nursing assistance to get up, bed and chair alarm. 8. Elevated DVT risk: progressive activities in PT, continue prophylaxis IHSAN hose, elevation and  no blood thinners because of recent bleed in the brain . 9. Complex discharge planning:  Weekly team meeting every Monday to assess progress towards goals, discuss and address social, psychological and medical comorbidities and to address difficulties they may be having progressing in therapy. Patient and family education is in progress. The patient is to follow-up with their family physician after discharge. Complex Active General Medical Issues that complicate care Assess & Plan:    1. showed moderate leukocyte cece check postvoid residual prescribed Macrobid check urine C AND S  14. Severe oral pharyngeal dysphasia patient failed his modified barium swallow on 9/8/17. He is now nothing by mouth pending a PEG tube Dr. Emil Amezquita consult dated I will also treat his thrush and prescribed IV fluids and   PPN and, until he is able to participate to been. This note transcribed by Tanvir Levin on 09/11/17 at 11:45 a.m.       Anamaria Cornejo D.O., PM&R     Attending    [address]

## 2017-09-11 NOTE — PROGRESS NOTES
Speech Language Pathology    NAME:  Ignacio Meneses  ROOM: [id number]/[id number]  :  10/28/1929  DATE: 2017      Speech Therapy attempted to see Ignacio Meneses on this date for a/an:    [] Bedside Swallow Evaluation   [] Speech/Language Evaluation   [] Modified Barium Swallow   [x] Treatment    Pt was unable to be seen due to patient:   [x] Currently off unit- Being transported to receive PEG tube   [] Refused   [] Too lethargic to participate    [] NPO for testing   [] On Bipap   [] Nursing Deferred:   [] Other:        Electronically signed by Warren Mohs, SLP on 17 at 11:12 AM

## 2017-09-12 NOTE — PROGRESS NOTES
[facility] Occupational Therapy      Date: 2017  Patient Name: Ignacio Meneses        MRN: [id number]  Account: [de-identified]   : 10/28/1929  (80 y.o.)  Room: [place] [address]    Attempted OT tx at 10:30 and 11:30 am, but pt. unavailable 2° to:      [x] Pt. . off floor for surgery. Will attempt again when able.     Electronically signed by KEI Stafford on 2017 at 3:51 PM

## 2017-09-12 NOTE — IP AVS SNAPSHOT
After Visit Summary  (Discharge Instructions)    Medication List for Home    Based on the information you provided to us as well as any changes during this visit, the following is your updated medication list.  Compare this with your prescription bottles at home. If you have any questions or concerns, contact your primary care physician's office. Daily Medication List (This medication list can be shared with any healthcare provider who is helping you manage your medications)      There are NEW medications for you.  START taking them after you leave the hospital        Last Dose    Next Dose Due AM NOON PM NIGHT    calcium carbonate 500 MG chewable tablet   Commonly known as:  TUMS   1 tablet by PEG Tube route 2 times daily                500 mg on 9/25/2017 10:00 AM                               coenzyme Q10 100 MG Caps capsule   Take 1 capsule by mouth 2 times daily                100 mg on 9/25/2017 10:04 AM                               insulin lispro 100 UNIT/ML injection vial   Commonly known as:  HUMALOG   Inject 0-3 Units into the skin nightly                1 Units on 9/24/2017  4:59 PM                               insulin lispro 100 UNIT/ML injection vial   Commonly known as:  HUMALOG   Inject 0-6 Units into the skin 3 times daily (with meals)                1 Units on 9/24/2017  4:59 PM                               lactobacillus acidophilus   2 tablets by PEG Tube route daily                2 tablets on 9/25/2017 10:00 AM     Next dose dur tomorrow 9-26-17 at 8AM.                       lisinopril-hydrochlorothiazide 20-25 MG per tablet   Commonly known as:  PRINZIDE;ZESTORETIC   1 tablet by PEG Tube route daily                1 tablet on 9/25/2017  3:26 PM     Next dose due tomorrow 9-26-17 at [address] told us you were taking these medications at home, but the amount or how often you take this medication has CHANGED        Last Dose Next Dose Due AM NOON PM NIGHT    acetaminophen 325 MG tablet   Commonly known as:  TYLENOL   Take 650 mg by mouth every 4 hours as needed for Pain or Fever. What changed:  Another medication with the same name was removed. Continue taking this medication, and follow the directions you see here. 650 mg on 9/16/2017  5:11 PM     Take 650mg every 4 hours as needed for pain or fever. atorvastatin 10 MG tablet   Commonly known as:  LIPITOR   1 tablet by PEG Tube route nightly   What changed:    - medication strength  - how much to take  - how to take this                10 mg on 9/24/2017  8:15 PM                               methadone 5 MG tablet   Commonly known as:  DOLOPHINE   1 tablet by PEG Tube route 2 times daily  For chronic pain. What changed:    - medication strength  - how much to take  - how to take this  - when to take this  - reasons to take this  - additional instructions                5 mg on 9/25/2017 10:00 AM                               tamsulosin 0.4 MG capsule   Commonly known as:  FLOMAX   Take 1 capsule by mouth nightly   What changed:  when to take this                0.4 mg on 9/24/2017  8:16 PM                                 These are medications you told us you were taking at home, CONTINUE taking them after you leave the hospital        Last Dose    Next Dose Due AM NOON PM NIGHT    aluminum & magnesium hydroxide simethicone 225-200-25 MG/5ML Susp   Take 30 mLs by mouth every 4 hours as needed (for GI distress). Take as needed every 4 hours. bisacodyl 10 MG suppository   Commonly known as:  DULCOLAX   Place 10 mg rectally as needed for Constipation (if MOM ineffective). Take as needed daily for constipation.                        citalopram 10 MG tablet   Commonly known as:  CELEXA   Take 10 mg by mouth daily                10 mg on 9/25/2017 10:06 AM Next dose due tomorrow 9-26-17 at 8am.                       docusate sodium 100 MG capsule   Commonly known as:  COLACE   Take 100 mg by mouth 2 times daily as needed for Constipation                  Take twice daily as needed for constipation. famotidine 20 MG tablet   Commonly known as:  PEPCID   Take 20 mg by mouth 2 times daily. ferrous sulfate 325 (65 Fe) MG tablet   Take 325 mg by mouth daily (with breakfast)                  Next dose due tomorrow 9-26-17 at 7am.                       Fish Oil 1200 MG Caps   Take 1 capsule by mouth daily                1,000 mg on 9/25/2017  3:24 PM     Next dose due tomorrow 9-26-17 at 8am.                       [address] 1 Dose rectally as needed (if dulcolax suppository ineffective). GLUCAGON EMERGENCY 1 MG injection   Generic drug:  glucagon   Infuse 1 kit intravenously once. glucose 40 % Gel   Commonly known as:  GLUTOSE   Take 15 g by mouth as needed (for hypoglycemic episode). magnesium hydroxide 400 MG/5ML suspension   Commonly known as:  MILK OF MAGNESIA   Take 30 mLs by mouth as needed for Constipation. magnesium oxide 400 MG tablet   Commonly known as:  MAG-OX   Take 400 mg by mouth daily                  Next dose due tomorrow 9-26-17 at 8am.                       metFORMIN 500 MG tablet   Commonly known as:  GLUCOPHAGE   Take 1 tablet by mouth 2 times daily (with meals).                                             metoprolol tartrate 50 MG tablet   Commonly known as:  LOPRESSOR   Take 50 mg by mouth 2 times daily                25 mg on 9/25/2017 10:01 AM                               PROTONIX 40 MG tablet   Generic drug:  pantoprazole   Take 40 mg by mouth daily                  Next dose due tomorrow 9-26-17 at 8am. These are the medications you have told us you were taking at home, STOP taking them after you leave the hospital     guaiFENesin 100 MG/5ML Soln oral solution   Commonly known as:  ROBITUSSIN       lisinopril 40 MG tablet   Commonly known as:  PRINIVIL;ZESTRIL       rivaroxaban 20 MG Tabs tablet   Commonly known as:  XARELTO       SAVAYSA 60 MG Tabs   Generic drug:  edoxaban tosylate            Where to Get Your Medications      You can get these medications from any pharmacy     Bring a paper prescription for each of these medications     atorvastatin 10 MG tablet    calcium carbonate 500 MG chewable tablet    coenzyme Q10 100 MG Caps capsule    insulin lispro 100 UNIT/ML injection vial    insulin lispro 100 UNIT/ML injection vial    lactobacillus acidophilus    lisinopril-hydrochlorothiazide 20-25 MG per tablet    methadone 5 MG tablet    tamsulosin 0.4 MG capsule               Allergies as of 9/25/2017     No Known Allergies      Immunizations as of 9/25/2017     No immunizations on file. Last Vitals          Most Recent Value    Temp  97 °F (36.1 °C)    Pulse  68    Resp  16    BP  103/65         After Visit Summary    This summary was created for you. Thank you for entrusting your care to us. The following information includes details about your hospital/visit stay along with steps you should take to help with your recovery once you leave the hospital.  In this packet, you will find information about the topics listed below:    · Instructions about your medications including a list of your home medications  · A summary of your hospital visit  · Follow-up appointments once you have left the hospital  · Your care plan at home      You may receive a survey regarding the care you received during your stay. Your input is valuable to us. We encourage you to complete and return your survey in the envelope provided. We hope you will choose us in the future for your healthcare needs. [address] [facility]  [phone]        Preventive Care        Date Due    Tetanus Combination Vaccine (1 - Tdap) 10/28/1948    Zoster Vaccine 10/28/1989    Pneumococcal Vaccines (two) for all adults aged 72 and over (1 of 2 - PCV13) 10/28/1994    Yearly Flu Vaccine (1) 2017                 Care Plan Once You Return Home    This section includes instructions you will need to follow once you leave the hospital.  Your care team will discuss these with you, so you and those caring for you know how to best care for your health needs at home. This section may also include educational information about certain health topics that may be of help to you. Discharge [address] [place] [address] Form    Patient Name: Macy Young   :  10/28/1929  MRN:  [id number]    Admit date:  2017  Discharge date:  2017    Code Status Order: DNR-CC   Advance Directives: No    Admitting Physician:  Pedro Pablo Houston DO  PCP: Shannon Hobbs MD    Discharging Nurse: [facility] Unit/Room#: F140/R232-10  Discharging Unit Phone Number: [phone]    Emergency Contact:        Past Surgical History:  Past Surgical History:   Procedure Laterality Date    CARDIAC PACEMAKER PLACEMENT Left 2015    HERNIA REPAIR      SC EGD PERCUTANEOUS PLACEMENT GASTROSTOMY TUBE N/A 2017    EGD ESOPHAGOGASTRODUODENOSCOPY PEG TUBE INSERTION performed by Nitish Flowers MD at [address] [place] [address] ESOPHAGOGASTRODUODENOSCOPY TRANSORAL DIAGNOSTIC N/A 2017    EGD ESOPHAGOGASTRODUODENOSCOPY performed by Magda Butler DO at [facility]       Immunization History: There is no immunization history on file for this patient.     Active Problems:  Patient Active Problem List   Diagnosis    Secondary hypertension    Dyslipidemia    Type 2 diabetes mellitus ([address]    Diabetic neuropathy ([address]    Vitamin D deficiency    Osteoarthritis    CVA (cerebrovascular accident) ([address]  Atrial fibrillation (HCC)    Hemiparesis, left (HCC)    TBI [facility]) [facility]    Traumatic Lt superior frontal lobe hematoma    Abnormality of gait and mobility due to TBI Lt superior frontal lobe hematoma, [facility] admit 09/07/17    Aphasia    BPH (benign prostatic hypertrophy)    Chronic back pain    Hyperlipidemia    Pacemaker    Intraparenchymal hemorrhage of brain (HCC)    Lobar cerebral hemorrhage (HCC)    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    Moderate episode of recurrent major depressive disorder (HCC)       Isolation/Infection:   Isolation     No Isolation            Nurse Assessment:  Last Vital Signs: /65  Pulse 68  Temp 97 °F (36.1 °C)  Resp 16  Ht 5' 7\" (1.702 m)  Wt 170 lb (77.1 kg)  SpO2 98%  BMI 26.63 kg/m2    Last documented pain score (0-10 scale): Pain Level: 0  Last Weight:   Wt Readings from Last 1 Encounters:   09/20/17 170 lb (77.1 kg)     Mental Status:  alert orientedx2    IV Access:  - None    Nursing Mobility/ADLs:  Walking   Assisted  Transfer  Assisted  Bathing  Assisted  Dressing  Assisted  Toileting  Assisted  Feeding  Dependent  Med Admin  Dependent  Med Delivery   peg pt npo    Wound Care Documentation and Therapy:        Elimination:  Continence:   · Bowel: No  · Bladder: No  Urinary Catheter: None   Colostomy/Ileostomy/Ileal Conduit: No and Peg tube    Date of Last BM:    No intake or output data in the 24 hours ending 09/25/17 1611       Safety Concerns:     Sundowners [place] and At Risk for Falls    Impairments/Disabilities:      None    Nutrition Therapy:  Current Nutrition Therapy:   - Tube Feedings:  Diabetic    Routes of Feeding: Gastrostomy Tube  Liquids: npo  Daily Fluid Restriction: no  Last Modified Barium Swallow with Video (Video Swallowing Test): done on 09/13/2017/***    Treatments at the Time of Hospital Discharge:   Respiratory Treatments:    Oxygen Therapy:  is not on home oxygen therapy.   Ventilator: - No ventilator support    Rehab Therapies: Physical Therapy, Occupational Therapy and Speech/Language Therapy  Weight Bearing Status/Restrictions: No weight bearing restirctions  Other Medical Equipment (for information only, NOT a DME order): Uses 2 people to transfer  Other Treatments:      Patient's personal belongings (please select all that are sent with patient):       RN SIGNATURE:  Electronically signed by Nehemias Westfall RN on 9/25/17 at 4:25 PM    PHYSICIAN SECTION    Prognosis: Good    Condition at Discharge: Stable    Rehab Potential (if transferring to Rehab): Good    Recommended Labs or Other Treatments After Discharge:      Physician Certification: I certify the above information and transfer of Mikell Phoenix  is necessary for the continuing treatment of the diagnosis listed and that he requires [facility] for greater 30 days. Update Admission H&P: No change in H&P    PHYSICIAN SIGNATURE: Dr Lugo Cancer   Electronically signed by Nehemias Westfall RN on 9/25/17 at [address] [place] [address]    CASE MANAGEMENT/SOCIAL WORK SECTION    Inpatient Status Date: Patient admitted on 9/12/2017 dc 9/25 to [place] [address] Readmission Risk Assessment Score:  Risk Score: 24   (Score > 14= high risk for readmission)    Discharging to Facility/ Agency   · Name: [facility]  · Address:  · Phone:[phone]  · Fax:    Dialysis Facility (if applicable)   · Name:  · Address:  · Dialysis Schedule:  · Phone:  · Fax:    / signature: Electronically signed by Nehemias Westfall RN on 9/25/17 at 4:14 PM      Diet Instructions     ? Good nutrition is important when healing from an illness, injury, or surgery. Follow any nutrition recommendations given to you during your hospital stay. ? If you were given an oral nutrition supplement while in the hospital, continue to take this supplement at home. You can take it with meals, in-between meals, and/or before bedtime.  These supplements can be

## 2017-09-12 NOTE — PROGRESS NOTES
Did not occur,  , Assessment: Pt presenting with above outlined impairments which have negatively impacted his functional mobility status and prevented him from retruning home at [facility]. Initiated PT program for balance, mobility and NMR in order to restore physical function and facilitate DC at highest level of [facility]. D/t the severity of pt's presentation, anticipate that pt will require significant assist upon DC. Will reassess as appropriate, however at this time pt expected to DC at [facility] level.      Occupational therapy: FIMS:  Eatin - Staff performs feeding for patient or patient requires IV fluids for hydration/TPN/PPN (npo)  Grooming:  (Activity did not occur)  Bathing:  (Refused. Activity did not occur)  Dressing-Upper:  (Wearing a gown.  Activity did not occur)  Dressing-Lower:  (Activity did not occur)  Toiletin - Total assist  Toilet Transfer: 3 - Requires 25-49% assist getting off toilet  Shower Transfer: 0 - Activity does not occur,  ,       Speech therapy: FIMS: Comprehension: 3 - Patient understands basic needs 50-74% of the time  Expression: 2 - Expresses basic ideas/needs 25-49% of the time  Social Interaction: 3 - Patient appropriate  50%-74% of the time  Problem Solvin - Patient solves simple/routine tasks 25%-49%  Memory: 2 - Patient remembers 25%-49% of the time       Lab/X-ray studies reviewed, analyzed and discussed with patient and staff:               Recent Results          Recent Results (from the past 24 hour(s))   Comprehensive Metabolic Panel     Collection Time: 17 10:00 AM   Result Value Ref Range     Sodium 138 132 - 144 mEq/L     Potassium 3.6 3.5 - 5.1 mEq/L     Chloride 100 98 - 107 mEq/L     CO2 23 22 - 29 mEq/L     Anion Gap 15 (H) 7 - 13 mEq/L     Glucose 147 (H) 74 - 109 mg/dL     BUN 13 8 - 23 mg/dL     CREATININE 0.73 0.70 - 1.20 mg/dL     GFR Non- >60.0 >60     GFR  >60.0 >60     Calcium 8.7 8.6 - 10.2 mg/dL     Total Protein 6.6 6.4 - 8.1 g/dL     Alb 3.4 (L) 3.9 - 4.9 g/dL     Total Bilirubin 0.7 0.0 - 1.2 mg/dL     Alkaline Phosphatase 459 (H) 35 - 104 U/L     ALT 27 0 - 41 U/L     AST 27 0 - 40 U/L     Globulin 3.2 2.3 - 3.5 g/dL   POCT Glucose     Collection Time: 09/11/17 11:34 AM   Result Value Ref Range     POC Glucose 176 (H) 60 - 115 mg/dl     Performed on ACCU-CHEK     POCT Glucose     Collection Time: 09/11/17  4:53 PM   Result Value Ref Range     POC Glucose 141 (H) 60 - 115 mg/dl     Performed on ACCU-CHEK     POCT Glucose     Collection Time: 09/11/17  8:42 PM   Result Value Ref Range     POC Glucose 132 (H) 60 - 115 mg/dl     Performed on ACCU-CHEK     POCT Glucose     Collection Time: 09/12/17  6:04 AM   Result Value Ref Range     POC Glucose 117 (H) 60 - 115 mg/dl     Performed on ACCU-CHEK              Ct Cervical Spine Wo Contrast  Result Date: 9/3/2017   3.2 CM INTRA-AXIAL ACUTE HEMATOMA IN THE LEFT PARIETAL LOBE, WITHIN A REGION OF ENCEPHALOMALACIA THAT REPRESENTS A REMOTE INFARCT. THERE ARE ADVANCED AGE-RELATED FINDINGS. THERE IS DEGENERATIVE DISEASE IN THE CERVICAL SPINE, WITHOUT EVIDENCE  OF FRACTURE OR TRAUMATIC SUBLUXATION. All CT scans at this facility use dose modulation, iterative reconstruction, and/or weight based dosing when appropriate to reduce radiation dose to as low as reasonably achievable.           Xr Chest Portable   Result Date: 9/3/2017   CONCLUSION: NO ACUTE PROCESS         Previous extensive, complex labs, notes and diagnostics reviewed and analyzed.      ALLERGIES:        Allergies as of 09/07/2017    (No Known Allergies)      (please also verify by checking MAR)     Today I evaluated this patient for periodic reassessment of medical and functional status.   The patient was discussed in detail at the treatment team meeting focusing on current medical issues, progress in therapies, social issues, psychological issues, barriers to progress and strategies to address these barriers, and

## 2017-09-12 NOTE — PROGRESS NOTES
Physical Therapy  Facility/Department: [facility]  Physical Therapy Missed Treatment     NAME: Collin Eagle    : 10/28/1929 (53 y.o.)  MRN: [id number]    Account: [de-identified]  Gender: male           [x] Patient Unavailable:  Pt off floor. Pt has not yet returned from surgery (PEG tube install). Will attempt PT treatment again at earliest convenience.       Electronically signed by Matti Zuleta PTA on 17 at 4:33 PM

## 2017-09-12 NOTE — PROGRESS NOTES
Renal Adjustment Per Protocol: Pepcid 20mg bid changed to daily based on    Recent Labs      09/11/17   1000   CREATININE  0.73    CrCl cannot be calculated (Unknown ideal weight.).     CrCl mannually calculated  Age 80  Male  Ht 170cm  Wt 81.6 kg  Serum Cr 1 mg/dL  IBW 65.87 kg  CrCl 48.5 ml/min

## 2017-09-12 NOTE — PLAN OF CARE
Problem: Risk for Impaired Skin Integrity  Goal: Tissue integrity - skin and mucous membranes  Structural intactness and normal physiological function of skin and  mucous membranes.    Outcome: Ongoing    Problem: Falls - Risk of  Goal: Absence of falls  Outcome: Ongoing    Problem: Infection:  Goal: Will remain free from infection  Will remain free from infection   Outcome: Ongoing    Problem: Safety:  Goal: Free from accidental physical injury  Free from accidental physical injury   Outcome: Ongoing  Goal: Free from intentional harm  Free from intentional harm   Outcome: Ongoing    Problem: Daily Care:  Goal: Daily care needs are met  Daily care needs are met   Outcome: Ongoing    Problem: Pain:  Goal: Patients pain/discomfort is manageable  Patients pain/discomfort is manageable   Outcome: Ongoing    Problem: Skin Integrity:  Goal: Skin integrity will stabilize  Skin integrity will stabilize   Outcome: Ongoing    Problem: IP COMMUNICATION/DYSARTHRIA  Goal: LTG - patient will improve expressive language skills to allow for communication of wants and needs in daily activities  Outcome: Ongoing    Problem: IP SWALLOWING  Goal: LTG - patient will tolerate the least restrictive diet consistency to allow for safe consumption of daily meals  Outcome: Ongoing  Goal: STG - Patient will participate in instrumental assessment of swallowing as appropriate  Outcome: Ongoing    Problem: Nutrition  Goal: Optimal nutrition therapy  Outcome: Ongoing

## 2017-09-12 NOTE — PROGRESS NOTES
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 He is getting IV fluids until he can get his plate placed early next week. His power of  is also in agreement with the PEG tube placement. I transitioned him from saline to PPN. I will hold his Glucophage until he is able to take by mouth. He will come off his Cipro and I'll recheck his urine because completed a 3 day course. He is occasionally combative. Likely secondary to all the commotion regarding him getting his PEG tube today. Hopefully once his medications get back on board he is able to eat he'll be less combative. He went for the patient today I was instructed to med discharge report readmit to the surgical department could accesses charted medications. I Went ahead and did that. ROS x10: The patient also complains of severely impaired mobility and activities of daily living. Otherwise no new problems with vision, hearing, nose, mouth, throat, dermal, cardiovascular, GI, , pulmonary, musculoskeletal, psychiatric or neurological. See Rehab H&P on Rehab chart dated . Vital signs:  /83  Pulse 87  Temp 98 °F (36.7 °C) (Oral)   Resp 18  Ht 5' 7\" (1.702 m)  Wt 180 lb (81.6 kg)  SpO2 99%  BMI 28.19 kg/m2  I/O:   PO/Intake:   Nothing by mouth due to profound dysphagia    Bowel/Bladder:  continent, no problems noted. General:  Patient is well developed, adequately nourished, non-obese and     well kempt. HEENT:    Severe thrush PERRLA, hearing intact to loud voice, external inspection of ear     and nose benign. Inspection of lips, tongue and gums benign  Musculoskeletal: No significant change in strength or tone. All joints stable.       Inspection and palpation of digits and nails show no clubbing,       cyanosis or inflammatory conditions. Neuro/Psychiatric: Affect: flat but pleasant. Alert and oriented to person, place and     situation. No significant change in deep tendon reflexes or     sensation  Lungs:  Diminished CTA-B. Respiration effort is normal at rest.     Heart:   S1 = S2, RRR. No loud murmurs. Abdomen:  Soft, non-tender, no enlargement of liver or spleen. Extremities:  No significant lower extremity edema or tenderness. Skin:   Intact to general survey, no visualized or palpated problems. Rehabilitation:  Physical therapy: FIMS:  Bed Mobility:      Transfers: Sit to Stand: Maximum Assistance  Stand to sit: Maximum Assistance  Bed to Chair: Dependent/Total (+2 assist)  Stand Pivot Transfers: Dependent/Total,  ,      FIMS: Bed, Chair, Wheel Chair: 1 - Requires >75% assitance to transfer  Walk: 1- Total Assistance (Subject less than 25%)  Distance Walked: 1ft  Wheel Chair: 1- Total Assistance (Subject less than 25%)  Distance Traveled in Wheel Chair: 10ft  Stairs: 0 - Did not occur,  , Assessment: Pt presenting with above outlined impairments which have negatively impacted his functional mobility status and prevented him from retruning home at [facility]. Initiated PT program for balance, mobility and NMR in order to restore physical function and facilitate DC at [facility] level of [facility]. D/t the severity of pt's presentation, anticipate that pt will require significant assist upon DC. Will reassess as appropriate, however at this time pt expected to DC at [facility] level. Occupational therapy: FIMS:  Eatin - Staff performs feeding for patient or patient requires IV fluids for hydration/TPN/PPN (npo)  Grooming:  (Activity did not occur)  Bathing:  (Refused. Activity did not occur)  Dressing-Upper:  (Wearing a gown.  Activity did not occur)  Dressing-Lower:  (Activity did not occur)  Toiletin - Total assist  Toilet Transfer: 3 - Requires 25-49% assist getting off toilet  Shower Transfer: 0 - Activity does not occur,  ,      Speech therapy: FIMS: Comprehension: 3 - Patient understands basic needs 50-74% of the time  Expression: 2 - Expresses basic ideas/needs 25-49% of the time  Social Interaction: 3 - Patient appropriate  50%-74% of the time  Problem Solvin - Patient solves simple/routine tasks 25%-49%  Memory: 2 - Patient remembers 25%-49% of the time      Lab/X-ray studies reviewed, analyzed and discussed with patient and staff:   Recent Results (from the past 24 hour(s))   Comprehensive Metabolic Panel    Collection Time: 17 10:00 AM   Result Value Ref Range    Sodium 138 132 - 144 mEq/L    Potassium 3.6 3.5 - 5.1 mEq/L    Chloride 100 98 - 107 mEq/L    CO2 23 22 - 29 mEq/L    Anion Gap 15 (H) 7 - 13 mEq/L    Glucose 147 (H) 74 - 109 mg/dL    BUN 13 8 - 23 mg/dL    CREATININE 0.73 0.70 - 1.20 mg/dL    GFR Non-African American >60.0 >60    GFR  >60.0 >60    Calcium 8.7 8.6 - 10.2 mg/dL    Total Protein 6.6 6.4 - 8.1 g/dL    Alb 3.4 (L) 3.9 - 4.9 g/dL    Total Bilirubin 0.7 0.0 - 1.2 mg/dL    Alkaline Phosphatase 459 (H) 35 - 104 U/L    ALT 27 0 - 41 U/L    AST 27 0 - 40 U/L    Globulin 3.2 2.3 - 3.5 g/dL   POCT Glucose    Collection Time: 17 11:34 AM   Result Value Ref Range    POC Glucose 176 (H) 60 - 115 mg/dl    Performed on ACCU-CHEK    POCT Glucose    Collection Time: 17  4:53 PM   Result Value Ref Range    POC Glucose 141 (H) 60 - 115 mg/dl    Performed on ACCU-CHEK    POCT Glucose    Collection Time: 17  8:42 PM   Result Value Ref Range    POC Glucose 132 (H) 60 - 115 mg/dl    Performed on ACCU-CHEK    POCT Glucose    Collection Time: 17  6:04 AM   Result Value Ref Range    POC Glucose 117 (H) 60 - 115 mg/dl    Performed on ACCU-CHEK        Ct Cervical Spine Wo Contrast  Result Date: 9/3/2017   3.2 CM INTRA-AXIAL ACUTE HEMATOMA IN THE LEFT PARIETAL LOBE, WITHIN A REGION OF ENCEPHALOMALACIA THAT REPRESENTS A REMOTE INFARCT. THERE ARE ADVANCED AGE-RELATED FINDINGS. THERE IS DEGENERATIVE DISEASE IN THE CERVICAL SPINE, WITHOUT EVIDENCE  OF FRACTURE OR TRAUMATIC SUBLUXATION. All CT scans at this facility use dose modulation, iterative reconstruction, and/or weight based dosing when appropriate to reduce radiation dose to as low as reasonably achievable. Xr Chest Portable   Result Date: 9/3/2017   CONCLUSION: NO ACUTE PROCESS       Previous extensive, complex labs, notes and diagnostics reviewed and analyzed. ALLERGIES:    Allergies as of 09/07/2017    (No Known Allergies)      (please also verify by checking MAR)    Today I evaluated this patient for periodic reassessment of medical and functional status. The patient was discussed in detail at the treatment team meeting focusing on current medical issues, progress in therapies, social issues, psychological issues, barriers to progress and strategies to address these barriers, and discharge planning. See the hand written addendum to rehab progress note. The patient continues to be high risk for future disability and their medical and rehabilitation prognosis continue to be good and therefore, we will continue the patient's rehabilitation course as planned. The patient's tentative discharge date was set. Patient and family education was discussed. The patient was made aware of the team discussion regarding their progress. Complex Physical Medicine & Rehab Issues Assess & Plan:   1. Severe abnormality of gait and mobility and impaired self-care and ADL's secondary to Severe cognitive deficits right hemiparesis aphasia dysphagia secondary to left superior frontal lobe hematoma .   Functional and medical status reassessed regarding patients ability to participate in therapies and patient found to be able to participate in acute intensive comprehensive inpatient rehabilitation program including PT/OT to improve balance, ambulation, ADLs, and to improve the P/AROM. Therapeutic modifications regarding activities in therapies, place, amount of time per day and intensity of therapy made daily. In bed therapies or bedside therapies prn.   2. Bowel and Bladder dysfunction:  frequent toileting, ambulate to bathroom with assistance, check post void residuals. Check for C.difficile x1 if >2 loose stools in 24 hours, continue bowel & bladder program.  Monitor bowel and bladder function. Lactinex 2 PO every AC. MOM prn, Brown Bomb prn, Glycerin suppository prn, enema prn.  3. Severe low back pain generalized OA pain: reassess pain every shift and prior to and after each therapy session, give prn Tylenol and  scheduled methadone which she's been on long-term prescription monitoring reviewed doses adjusted because of neurologic injury and advanced age, modalities prn in therapy, Lidoderm, K-pad prn.   4. Skin healing and breakdown risk:  continue pressure relief program.  Daily skin exams and reports from nursing. 5. Fatigue due to nutritional and hydration deficiency:  continue to monitor I&Os, calorie counts prn, dietary consult prn. IV fluids until patient able to use the PEG tube he is currently nothing by mouth  6. Acute episodic insomnia with situational adjustment disorder:  prn Ambien, monitor for day time sedation. 7. Falls risk elevated:  patient to use call light to get nursing assistance to get up, bed and chair alarm. 8. Elevated DVT risk: progressive activities in PT, continue prophylaxis IHSAN hose, elevation and  no blood thinners because of recent bleed in the brain . 9. Complex discharge planning:  Weekly team meeting every Monday to assess progress towards goals, discuss and address social, psychological and medical comorbidities and to address difficulties they may be having progressing in therapy. Patient and family education is in progress. The patient is to follow-up with their family physician after discharge.         Complex Active General prescribed cinnamon oil. 13. Acute UTI, UA on 09/08/17 showed moderate leukocyte cece check postvoid residual prescribed Macrobid check urine C AND S  14. Severe oral pharyngeal dysphasia patient failed his modified barium swallow on 9/8/17. He is now nothing by mouth pending a PEG tube Dr. Mayito Bernard consult dated I will also treat his thrush and prescribed IV fluids and   PPN and, until he is able to participate to been. This note transcribed by Vikki De La Cruz on 09/11/17 at 11:45 a.m.       Leyla Zepeda D.O., PM&R     Attending    [address]

## 2017-09-13 NOTE — PROGRESS NOTES
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 2017    Moderate episode of recurrent major depressive disorder ([facility] 2017    Atrial fibrillation (HCC)     Hemiparesis, left (HCC)     TBI (traumatic brain injury) ([facility]     Traumatic Lt superior frontal lobe hematoma     Abnormality of gait and mobility due to TBI Lt superior frontal lobe hematoma, [facility] 17     Aphasia     BPH (benign prostatic hypertrophy)     Chronic back pain     Hyperlipidemia     Pacemaker     Lobar cerebral hemorrhage ([facility] 2017    Intraparenchymal hemorrhage of brain ([facility] 2017    Hypertension     Dyslipidemia     Type 2 diabetes mellitus (HCC)     Diabetic neuropathy (HCC)     Vitamin D deficiency     Osteoarthritis     CVA (cerebrovascular accident) ([facility]        Restrictions  Restrictions/Precautions  Restrictions/Precautions: Fall Risk, General Precautions  Position Activity Restriction  Other position/activity restrictions: HOB 30degrees, Abd binder   NPO  Subjective   General  Referring Practitioner: Dr. Loretta Wilson   Diagnosis: TBI- left superior frontal lobe hematoma s/p fall with impaired mobility   Pre Treatment Pain Screening  Intervention List: Patient able to continue with treatment  Comments / Details: Pain at start of tx and end of PM tx. was not rated nor described. No significant pain behaviors noted. Pain Assessment  Patient Currently in Pain: Yes  Pain Assessment:  (did not rate)  Pain Type:  (did not describe)  Pain Radiating Towards: \"everywhere  Vital Signs  Patient Currently in Pain: Yes      Objective     Pt. utilized 1# weight for L/UE ADL strengthening and rom.   Pt. required initial assist for given exercises and encouragement to continue. Pt. pinch 1-8# resistive clothespins onto graded horizontal dowel rods with difficulty pinching and placing 4,6 8# resistances. Pt. participated at a very low level. Discharge Recommendations: Continue to assess pending progress  REQUIRES OT FOLLOW UP: Yes  Activity Tolerance  Activity Tolerance: Treatment limited secondary to agitation;Treatment limited secondary to decreased cognition  Activity Tolerance: poor participation  Safety Devices  Safety Devices in place: Yes  Type of devices: All fall risk precautions in place       Discharge Recommendations:  Continue to assess pending progress     Plan   Plan  Times per week: 5-7 x/wk,  minutes   Times per day: Daily  Plan weeks: 3-6 weeks  Current Treatment Recommendations: Strengthening, ROM, Balance Training, Functional Mobility Training, Endurance Training, Neuromuscular Re-education, Self-Care / ADL, Pain Management, Cognitive Reorientation, Cognitive/Perceptual Training, Patient/Caregiver Education & Training, Equipment Evaluation, Education, & procurement, Safety Education & Training  Goals  Patient Goals   Patient goals : Pt nodded when ask if Pt would like to get stronger.         Therapy Time   Individual Concurrent Group Co-treatment   Time In 1430         Time Out 1500         Minutes [address] [place] [address]    Electronically signed by MANISHA Durham on 9/13/2017 at 4:28 PM

## 2017-09-13 NOTE — PROGRESS NOTES
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 2017    Moderate episode of recurrent major depressive disorder ([address] 2017    Atrial fibrillation (HCC)     Hemiparesis, left (HCC)     TBI (traumatic brain injury) ([address]     Traumatic Lt superior frontal lobe hematoma     Abnormality of gait and mobility due to TBI Lt superior frontal lobe hematoma, [facility]     Aphasia     BPH (benign prostatic hypertrophy)     Chronic back pain     Hyperlipidemia     Pacemaker     Lobar cerebral hemorrhage ([address] 2017    Intraparenchymal hemorrhage of brain ([address] 2017    Hypertension     Dyslipidemia     Type 2 diabetes mellitus (HCC)     Diabetic neuropathy (HCC)     Vitamin D deficiency     Osteoarthritis     CVA (cerebrovascular accident) ([address]        Past Medical History:   Diagnosis Date    Abnormality of gait and mobility     Aphasia     Atrial fibrillation (HCC)     BPH (benign prostatic hypertrophy)     Chronic back pain     CVA (cerebrovascular accident) ([address]     Diabetic neuropathy ([address]     Dyslipidemia     Hemiparesis, left ([address]     Hyperlipidemia     Hypertension     Neuropathy ([address]     Osteoarthritis     neck, back    Pacemaker     TBI (traumatic brain injury) ([address]     Traumatic Lt superior frontal lobe hematoma     Type II or unspecified type diabetes mellitus without mention of complication, uncontrolled     Vitamin D deficiency      Past Surgical History:   Procedure Laterality Date    CARDIAC PACEMAKER PLACEMENT Left     HERNIA REPAIR      DE EGD PERCUTANEOUS PLACEMENT GASTROSTOMY TUBE N/A concerns    Cognition:  Orientation Level: Disoriented X4  Follows Commands: Impaired (COmbative and resistive to cues; unable to appropriately respond verbally; agitated )  Observation/Palpation  Observation: Pt generally disengaged/lethargic; no acute distress; noted R hand swelling    ROM:  RLE PROM: WFL  LLE PROM: WFL  RUE PROM: WFL  LUE PROM: WFL    Strength:  Strength Other  Other: Pt unable to follow formal MMT; Resistant to most activities - functional assessment reveals gross weakness generally throughout all extremities and trunk with R side most effected. Pt able to flex hips and knees partially in gravity eliminated position; maintains midline trunk in unsupported sitting once positioned    Neuro:        Balance  Comments: Unable to follow formal balance assessment; Pt maintains unsupported sitting EOB with CGA once positioned; impulsive and unable to follow commands with no awarness to environment or safety. Absent protetive righting reflexes. Motor Control  Gross Motor?:  (Resistive, disoriented)    Bed mobility  Rolling to Left: Dependent/Total  Rolling to Right: Dependent/Total  Supine to Sit: Dependent/Total  Sit to Supine: Dependent/Total  Scooting: Dependent/Total  Comment: Unable to follow commands, and resistive to most manual cues and assistance, striking out at therapist during sit>supine    Transfers  Sit to Stand: Unable to assess;Dependent/Total  Stand to sit: Unable to assess;Dependent/Total  Comment: 3 attempts made with pt unable to participate d/t increased agitation and confusion; resists motion to transfer weight to feet with retropulsion. Ambulation  Ambulation?: No (unable to assess d/t safety)  Stairs/Curb  Stairs? :  (unable to assess d/t safety )    Activity Tolerance  Activity Tolerance: Treatment limited secondary to agitation;Patient limited by cognitive status        Car transfers: Not tested  Walk 10 ft: Not tested  Walk 50 ft with 2 turns: Not tested  Walk 150 ft in

## 2017-09-13 NOTE — PROGRESS NOTES
Assess & Plan: 1.   Intraparenchymal hemorrhage of brain Lobar cerebral hemorrhage   TBI (traumatic brain injury),  Traumatic Lt superior frontal lobe hematoma-with high tone issues - consult Dr. Maryellen Harry. [facility] thinners until seen by Dr. Kwaku Carrington and limited other toxic sedating medications  2.   Hypertension, atrial fibrillation, hyperlipidemia - continue blood pressure checks, adjust/add medications (Lipitor, Lisinopril, Lopressor). 3.   Type 2 diabetes mellitus   with  Diabetic neuropathy - Continue blood sugar checks, 1500-calorie ADA diet, adjust/add medications hold Glucophage until patient by mouth  His blood sugars looked good 111-92-patient may not need Glucophage as his blood sugars have been at perfect consistent here  5.   History of CVA (cerebrovascular accident), with residual Hemiparesis, left -coordinate advanced nuanced rehabilitation with  [facility] accredited acute rehabilitation team  6.   Severe cognitive deficits as well as Aphasia - speech therapy consult. 7.   BPH (benign prostatic hypertrophy) - Flomax.  Dose  at at bedtime and check postvoid residual  8.   Chronic back pain dt   Osteoarthritis- K pad Lidoderm prophylactic Tylenol on a scheduled basis patient is artery on high-dose methadone on a regular basis and may not be tolerating that much longer as he ages we will attempt to wean him to a lower dose if tolerated  9.   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 I have decreased dose of methadone because of severe sedation and advancing age  8.   Moderate episode of recurrent major depressive disorder - emotional support, adjust/add medications (Celexa). 11. Right neglect, high tone -   I plan to slowly introduce Zanaflex.  Monitor liver function test and for over medication-I will hold Zanaflex until he is able to give his PEG tube in  12.  Severe thrush Yeast on tongue - I prescribed cinnamon oil.    13. Acute UTI, UA on 09/08/17 showed moderate leukocyte cece check postvoid residual prescribed Macrobid check urine C AND S  Severe oral pharyngeal dysphasia  patent tube feeding transition to bolus feeds   Update he is now status post PEG tube placement      This note transcribed by Debbie Back on 09/13/17 at 11:51 a.m.        George Garzon D.O., PM&R                                                                 Attending                                         Manuel Brito

## 2017-09-13 NOTE — PROGRESS NOTES
Physical Therapy  Facility/Department: [facility] Daily Treatment Note    NAME: Josesito Landrum    : 10/28/1929 (89 y.o.)  MRN: [id number]    Account: [de-identified]  Gender: male    Date of Service: 17      SUBJECTIVE:     Start of tx pain 0/10    End of tx pain 0/10      OBJECTIVE:    Bed Mobility: (REHAB MAT)  Rolling: Moderate Assist to L (Min to R)  Supine to sit: Moderate Assist  Sit to Supine: Moderate Assist     *Completed multiple times emphasizing technique (clubbing)     Transfers:   Sit to stand: Minimal Assist > CGA  Stand to sit: Contact Guard Assist  Chair to/from bed: Minimal Assist  HHA no AD  (2X stand pivot)  Car Transfer: Not tested        Gait/Ambulation:   Stood only    Curb Step:  NT  unsafe    Stair Negotiation:  NT  Unsafe    Exercise:   NT      Neuromuscular re-education - the following techniques and tasks were performed in therapy session to facilitate normalized motor control, balance reactions and movement patterns              NDT:  ·  Sit to stand  ·  Motor control: several side lying to sit attempts with varying assist (Max<>Min A)        Weight shift:  · Ant/Post: ant WS with MRE into extension  · Rotation: LUE reaching with rotation to varying heights. Balance:  · Dynamic  · Static: Pt sat EOB on rehab mat unsupported with no LOB noted       PLAN:   Continue per POC      Comments:   Decreased assist for rolling to R. Pt able to stand; pt has more motor control of RLE and minimal of RUE. Increased time to complete bed mobility training. Pt able to maintain midline in seated EOB. Falls Safety Armband Present: [x] Yes  [] No    Safety/Judgment:   Fair            Minutes:26      Transfer/Bed mobility trainin      Gait training:       Neuro re education: 12      Therapeutic ex:       Therapy Time:    Individual   Time In 1334   Time Out 1400   Minutes 26       Electronically signed by Lucía Zamora PTA on 2017 at 1:37 PM

## 2017-09-13 NOTE — CONSULTS
Transdermal Daily     Continuous Infusions:   dextrose      dextrose       PRN Meds:.dextrose, dextrose, glucagon (rDNA), glucose, acetaminophen, bisacodyl, dextrose, dextrose, fleet, glucagon (rDNA), glucose, guaiFENesin, magnesium hydroxide, acetaminophen, aluminum & magnesium hydroxide-simethicone, HYDROmorphone  .  dextrose      dextrose          Allergies:     No Known Allergies     Review of Systems:       Review of Systems   Constitutional: Negative for diaphoresis and fatigue. HENT: Negative. Eyes: Negative. Respiratory: Negative. Negative for cough, chest tightness, shortness of breath, wheezing and stridor. Cardiovascular: Negative. Negative for chest pain, palpitations and leg swelling. Gastrointestinal: Negative. Negative for blood in stool and nausea. Genitourinary: Negative. Musculoskeletal: Negative. Skin: Negative. Neurological: Positive for weakness. Negative for dizziness, syncope and light-headedness. Hematological: Negative. Psychiatric/Behavioral: Negative. Objective Findings:     Vitals:BP (!) 176/92  Pulse 83  Temp 97 °F (36.1 °C) (Oral)   Resp 15  Ht 5' 7\" (1.702 m)  Wt 173 lb 11.6 oz (78.8 kg)  SpO2 97%  BMI 27.21 kg/m2     Physical Examination:    Physical Exam   Constitutional: He appears healthy. No distress. HENT:   Normal cephalic and Atraumatic   Eyes: Pupils are equal, round, and reactive to light. Neck: Normal range of motion and thyroid normal. Neck supple. No JVD present. No adenopathy. No thyromegaly present. Cardiovascular: Normal rate, regular rhythm, intact distal pulses and normal pulses. Murmur heard. Systolic murmur is present with a grade of 1/6   Right sided weakness of ARM   Pulmonary/Chest: Effort normal and breath sounds normal. He has no wheezes. He has no rales. He exhibits no tenderness. Abdominal: Soft. Bowel sounds are normal.   Musculoskeletal: Normal range of motion.  He exhibits no edema or hematoma. Examination of the cervical spine reveals relatively mild degenerative disease, with spondylosis greatest at C6-7. There is advanced apophyseal joint arthrosis bilaterally symmetric. There is no fracture. There is no traumatic subluxation. 3.2 CM INTRA-AXIAL ACUTE HEMATOMA IN THE LEFT PARIETAL LOBE, WITHIN A REGION OF ENCEPHALOMALACIA THAT REPRESENTS A REMOTE INFARCT. THERE ARE ADVANCED AGE-RELATED FINDINGS. THERE IS DEGENERATIVE DISEASE IN THE CERVICAL SPINE, WITHOUT EVIDENCE  OF FRACTURE OR TRAUMATIC SUBLUXATION. All CT scans at this facility use dose modulation, iterative reconstruction, and/or weight based dosing when appropriate to reduce radiation dose to as low as reasonably achievable. Xr Chest Portable    Result Date: 9/3/2017  EXAMINATION: XR CHEST PORTABLE CLINICAL HISTORY: Fall last night. Right eye injury. COMPARISONS: None available. FINDINGS: Single AP portable view the chest is obtained on September 3, 2017 at 1209 hours. The heart is not enlarged. The mediastinum is not widened. There is no acute process in the lungs. There is granulomatous calcification in AP window. There is an unremarkable single lead permanent pacemaker in place in the left. The chest wall is unremarkable. CONCLUSION: NO ACUTE PROCESS         Impression:     1. Htn  CVA  Metoprolol recently started. Plan:   1. Add Lisinopril/HCTZ  Continue all other meds. 2.       Comments: Thank you for allowing us to participate in the care of this patient. Will continue to follow. Please call if questions or concerns arise.     Electronically signed by Kristen Alamo MD on 9/13/2017 at 5:12 PM

## 2017-09-13 NOTE — PROGRESS NOTES
Physical Therapy Assessment Note  Facility/Department: [facility] R255/R255-01      NAME: Chilo Childs  : 10/28/1929 (89 y.o.)  MRN: [id number]  CODE STATUS: DNR-CC    Date of Service: 2017    Returned to pt's room at 120PM to continue mobility assessment. Pt resting in supine position with LPN addressing PEG tube upon PT arrival. Pt agreeable to assessment. Pt completed sit>supine transfer with MaxA. Initiates motion by sliding LEs to EOB, however requires significant assist to lift trunk. Once positioned EOB, pt maintains static sitting without assist.    Pt completed modified squat/pivot transfer bed>WC with MaxA. Pt initiates movement with verbal direction to reach for WC armrest and manual cueing to transfer weight to to feet. Able to adjust his bottom in WC with SBA. Completes Sit<>stand from [facility] X 5 trials with MaxA required d/t decreased anterior weight shift.      Time in: 1320  Time out: 368 Vivienne Vazquez, PT, 17 at 3:24 PM

## 2017-09-13 NOTE — PROGRESS NOTES
Indpendent   []6 - Modified Independent  []6 - Modified Independent   []5 - Supervision   []5 - Supervision   []4 - Min Assist   []4 - Min Assist   []3 - Mod Assist   []3 - Mod Assist   [x]2 - Max Assist   [x]2 - Max Assist   []1 - Dependent   []1 - Dependent    Problem Solving        Memory   []7 - Independent   []7 - Independent   []6 - Modified Independent  []6 - Modified Independent   []5 - Supervision   []5 - Supervision   []4 - Min Assist   []4 - Min Assist   []3 - Mod Assist   []3 - Mod Assist   []2 - Max Assist   []2 - Max Assist   [x]1 - Dependent   [x] 1 -Dependent            Mimi Pederson M.S., CF-SLP

## 2017-09-13 NOTE — PROGRESS NOTES
Occupational Therapy   Occupational Therapy Initial Assessment  Date: 2017   Patient Name: Haroldo Berrios  MRN: [id number]     : 10/28/1929    Past Medical History:   Diagnosis Date    Abnormality of gait and mobility     Aphasia     Atrial fibrillation (HCC)     BPH (benign prostatic hypertrophy)     Chronic back pain     [address]A (cerebrovascular accident) ([address]     Diabetic neuropathy ([address]     Dyslipidemia     Hemiparesis, left ([address]     Hyperlipidemia     Hypertension     Neuropathy (HCC)     Osteoarthritis     neck, back    Pacemaker     TBI (traumatic brain injury) ([address]     Traumatic Lt superior frontal lobe hematoma     Type II or unspecified type diabetes mellitus without mention of complication, uncontrolled     Vitamin D deficiency      Past Surgical History:   Procedure Laterality Date    CARDIAC PACEMAKER PLACEMENT Left 2015    HERNIA REPAIR      ME EGD PERCUTANEOUS PLACEMENT GASTROSTOMY TUBE N/A 2017    EGD ESOPHAGOGASTRODUODENOSCOPY PEG TUBE INSERTION performed by Maria Antonia Elliott MD at [address] [place] [address] ESOPHAGOGASTRODUODENOSCOPY TRANSORAL DIAGNOSTIC N/A 2017    EGD ESOPHAGOGASTRODUODENOSCOPY performed by Maxime Rutledge DO at [facility]       Treatment Diagnosis: TBI- left superior frontal lobe hematoma s/p fall with impaired mobility       Restrictions  Restrictions/Precautions  Restrictions/Precautions: Fall Risk, General Precautions    Subjective   General  Chart Reviewed: Yes  Referring Practitioner: Dr. Lexii Richards   Diagnosis: TBI- left superior frontal lobe hematoma s/p fall with impaired mobility   Pain Assessment  Patient Currently in Pain: No  Pain Assessment:  (unable to assess)  Odom-Cui Pain Rating: Hurts worst  Pain Level: 10  Pain Type: Chronic pain  Oxygen Therapy  SpO2: 97 %  O2 Device: None (Room air)  Social/Functional History  Social/Functional History  Lives With: Alone  Type of Home: House  Home Layout: Two level  Home Access: Stairs to enter with rails  Entrance Stairs - Number of Steps: 3  Bathroom Equipment: Shower chair (Pt nodded head yes when asked if he had a shower chair)  Home Equipment: Cane, Rolling walker  Receives Help From: Family  ADL Assistance: Independent  Homemaking Responsibilities: Yes  Meal Prep Responsibility: Primary  Ambulation Assistance: Independent  Transfer Assistance: Independent  Active : No  Patient's  Info: pt nodded head when ask if graddaughter drive pt  Mode of Transportation: Car  Occupation: Retired  Type of occupation: worked on railroad   Leisure & Hobbies: Pt unable to answer question  Additional Comments: Pt unbale to offer PLOF or home set up information during interview. Information provided by grand daughter. Per her report, pt lives alone. She lives next door. States that pt has a cane and [employer], however doesn't like to use either. He is independent at household level from mobility standpoint. [address] daughter states that she bathes him and assists with homemaking responsibilities.  Reports that pt has had some residual R side weakness from remote CVA, however at this time, he is significantly weakner than his normal.  (09/08/17)       Objective   Vision: Impaired (Pt nodded head when ask if he has a hard time seeing things but nodded no when asked if he wears glasses.)  Hearing: Exceptions to [facility]  Hearing Exceptions: Hard of hearing/hearing concerns       Observation/Palpation  Observation: Pt generally disengaged/lethargic; no acute distress; noted R hand swelling  Toilet Transfers  Toilet Transfers Comments: Unsafe to perform 2° to pt very lathargic and could not keep eyes open  Shower Transfers  Shower Transfers: Not tested  [employer] Transfers Comments: Unsafe to perform 2° to pt very lathargic and could not keep eyes open  Wheelchair Bed Transfers  Wheelchair Transfers Comments: Unsafe to perform 2° to pt very lathargic and could not keep eyes open     ADL  Feeding: Dependent/Total (Pt NPO and on a  minutes   Times per day: Daily  Plan weeks: 3-6 weeks  Current Treatment Recommendations: Strengthening, ROM, Balance Training, Functional Mobility Training, Endurance Training, Neuromuscular Re-education, Self-Care / ADL, Pain Management, Cognitive Reorientation, Cognitive/Perceptual Training, Patient/Caregiver Education & Training, Equipment Evaluation, Education, & procurement, Safety Education & Training     Goals  Patient Goals   Patient goals : Pt nodded when ask if Pt would like to get stronger. [x]  Patient will complete self care as followed using the recommended adaptive equipment and/or adaptive techniques as instructed:  Feeding: Mod I  Grooming: Mod I  Bathing: SBA  UE Dressing: Set Up  LE Dressing: Set Up  Toileting: SBA  Toilet Transfers: Min A   Tub Transfers: Min A   [x]  Patient will sequence self-care routine with 25% verbal/tactile cues. [x]  Patient will improve static and dynamic standing balance to complete pants management at device level     [x]  Patient will improve R UE Function (AROM, strength, motor control, tone normalization) to complete ADLs as projected. [x]  Patient will improve functional endurance to tolerate/complete 60 minutes of ADLs. [x]  Patient will improve L UE strength and endurance to 4/5 in order to participate in self-care activities as projected. [x]  Patient will improve B hand fine motor coordination to [facility] in order to manage clothing fasteners/self-care containers in a timely manner         [x]  Patient will perform kitchen mobility at device level without episodes of LOB and good safety awareness          [x]  Patient will access appropriate D/C site with as few architectural barriers as possible.                    [x]  Patient's cognition will improve to safely perform ADLs:  Comprehension: Supervision  Expression: Supervision  Social Interaction: Min A  Problem Solving: Min A  Memory: Min A             Therapy Time   Individual

## 2017-09-14 NOTE — PROGRESS NOTES
[facility]   Acute  Rehabilitation  MUSIC THERAPY      Date:  9/14/2017        Patient Name: Sanna Hagan       MRN: [id number]        YOB: 1929 (80 y.o.)       Gender: male  Diagnosis: TBI- left superior frontal lobe hematoma s/p fall with impaired mobility   Referring Practitioner: Dr. Susana Mallory     RESTRICTIONS/PRECAUTIONS:  Restrictions/Precautions: Fall Risk, General Precautions  Vision:  (Unable to assess formally; averted gaze - opens eyes on command 50% of time. )  Hearing: Exceptions to [facility]  Hearing Exceptions: Hard of hearing/hearing concerns    Time of Session: 11:35am - 11:55am     PAIN RATING BEFORE MUSIC THERAPY SESSION:   [x] No  [] Yes        Location: n/a    Pain Rating: n/a    Comment(s): n/a    ANXIETY RATING BEFORE MUSIC THERAPY SESSION:  [x] No  [] Yes         Anxiety Rating: n/a    Comment(s): n/a    SUBJECTIVE: \"I like [employer] music. \"     OBJECTIVE:        [x] To Improve Mood     [x] To Increase Social Well-Being  [x] To Increase Focus   [x] To Increase Emotional Well-Being  [x] To Increase Eye Contact    [] To Increase Spiritual Well-Being   [] To Decrease Anxiety   [] To Increase Relaxation   [] To Decrease Pain    [] To Increase Communication  [] To Increase Movement to Music       MUSIC INTERVENTION PROVIDED:     [x] Live Music on Voice  [] Recorded Music   [x] Live Music on Guitar  [x] Discussion Related to Music   [] Live Music on Q-chord  [x] Discussion Related to Pt Experience   [] Live Music on Percussion      PARTICIPATION LEVEL OF PATIENT:     [x] Active with discussion   [x] Passive with discussion   [] Active with singing    [x] Passive with singing   [] Active with instrument playing  [] Passive with instrument playing   [] Actively listening to music   [x] Passively listening to music.          OUTCOMES OBSERVED:      [x] Improved Mood   [] Increased Social Well-Being  [] Increased Focus   [x] Increased Emotional Well-Being  [] Increased Eye

## 2017-09-14 NOTE — PROGRESS NOTES
Comfortable. Tolerating g-tube feeding. Abdomen soft , no distension. No leakage or bleeding aroun g-tube.

## 2017-09-14 NOTE — PROGRESS NOTES
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 2017    Moderate episode of recurrent major depressive disorder ([facility] 2017    Atrial fibrillation (HCC)     Hemiparesis, left (HCC)     TBI (traumatic brain injury) ([facility]     Traumatic Lt superior frontal lobe hematoma     Abnormality of gait and mobility due to TBI Lt superior frontal lobe hematoma, [facility]     Aphasia     BPH (benign prostatic hypertrophy)     Chronic back pain     Hyperlipidemia     Pacemaker     Lobar cerebral hemorrhage ([facility] 2017    Intraparenchymal hemorrhage [facility] brain ([facility] 2017    Secondary hypertension     Dyslipidemia     Type 2 diabetes mellitus (HCC)     Diabetic neuropathy (HCC)     Vitamin D deficiency     Osteoarthritis     CVA (cerebrovascular accident) ([facility]        Restrictions  Restrictions/Precautions  Restrictions/Precautions: Fall Risk, General Precautions  Position Activity Restriction  Other position/activity restrictions: HOB 30degrees, Abd binder   NPO  Subjective   General  Referring Practitioner: Dr. Ty Lara   Diagnosis: TBI- left superior frontal lobe hematoma s/p fall with impaired mobility   Pre Treatment Pain Screening  Scale Used:  (did not rate)  Intervention List: Patient able to continue with treatment  Pain Assessment  Patient Currently in Pain: Yes  Pain Assessment:  (would not rate)  Pain Level:  (would not rate)  Pain Type: Acute pain  Pain Location: Abdomen     Objective     Pt. was instructed on L/digit strengthening activity utilizing the 1-8# resistive clothes pins and placing them on graded horizontal dowel rods.   Pt. demonstrated improved tolerance and ability pinching open and placing the 6-8# pins on thicker dowel rods. Pt required extra time to complete and did doze off a few times. Assessment      Activity Tolerance  Activity Tolerance: Treatment limited secondary to agitation;Treatment limited secondary to decreased cognition  Activity Tolerance: mild inmovement in tx. participation. Safety Devices  Safety Devices in place: Yes  Type of devices: Bed alarm in place;Call light within reach; Patient at risk for falls; Left in bed;Nurse notified       Discharge Recommendations:  Continue to assess pending progress     Plan   Plan  Times per week: 5-7 x/wk,  minutes   Times per day: Daily  Plan weeks: 3-6 weeks  Current Treatment Recommendations: Strengthening, ROM, Balance Training, Functional Mobility Training, Endurance Training, Neuromuscular Re-education, Self-Care / ADL, Pain Management, Cognitive Reorientation, Cognitive/Perceptual Training, Patient/Caregiver Education & Training, Equipment Evaluation, Education, & procurement, Safety Education & Training  Goals  Patient Goals   Patient goals : Pt nodded when ask if Pt would like to get stronger.         Therapy Time   Individual Concurrent Group Co-treatment   Time In 1430         Time Out 1500         Minutes [address] [place] [address] [facility]    Electronically signed by MANISHA Arana on 9/14/2017 at 4:41 PM

## 2017-09-14 NOTE — PROGRESS NOTES
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 He complains of abdominal cramps. He was found out of bed last p.m.  he denies any injury there is no signs of trauma. He is no longer having the abdominal cramping and 8 is feeling better with the abdominal binder for support His right UE is swollen at IV site.      ROS x10: The patient also complains of severely impaired mobility and activities of daily living. Otherwise no new problems with vision, hearing, nose, mouth, throat, dermal, cardiovascular, GI, , pulmonary, musculoskeletal, psychiatric or neurological. See Rehab H&P on Rehab chart dated 09/08/17.      Vital signs:                                     /83  Pulse 87  Temp 98 °F (36.7 °C) (Oral)   Resp 18  Ht 5' 7\" (1.702 m)  Wt 180 lb (81.6 kg)  SpO2 99%  BMI 28.19 kg/m2  I/O:                                                      PO/Intake:   Nothing by mouth due to profound dysphagia. PEG-tube feedings       PEG-tube 25cc Glucerna titrate to 60cc then bolus with water flush. Bowel/Bladder:  continent, no problems noted. General:                                         Patient is well developed, adequately nourished, non-obese and                                                                    well kempt. HEENT:                                           Severe thrush PERRLA, hearing intact to loud voice, external inspection of ear                                                                    and nose benign. Inspection of lips, tongue and gums benign  Musculoskeletal:                 No significant change in strength or tone. All joints stable. Inspection and palpation of digits and nails show no clubbing,                                                                      cyanosis or inflammatory conditions. Neuro/Psychiatric:              Affect: flat but pleasant. Alert and oriented to person, place and                                                                    situation. No significant change in deep tendon reflexes or                                                                    sensation  Lungs:                                            Diminished CTA-B. Respiration effort is normal at rest.     Heart:                                                        S1 = S2, RRR. No loud murmurs. Abdomen:                                      Soft,  PEG tube-tender, no enlargement of liver or spleen. Extremities:                                   No significant lower extremity edema or tenderness. Right UE infiltrated. Skin:                                                         Intact to general survey, no visualized or palpated problems.     Rehabilitation:  Physical therapy: FIMS:  Bed Mobility:       Transfers: Sit to Stand: Maximum Assistance  Stand to sit: Maximum Assistance  Bed to Chair: Dependent/Total (+2 assist)  Stand Pivot Transfers: Dependent/Total,  ,       FIMS: Bed, Chair, Wheel Chair: 1 - Requires >75% assitance to transfer  Walk: 1- Total Assistance (Subject less than 25%)  Distance Walked: 1ft  Wheel Chair: 1- Total Assistance (Subject less than 25%)  Distance Traveled in Wheel Chair: 10ft  Stairs: 0 - Did not occur,  , Assessment: Pt presenting with above outlined impairments which have negatively impacted his functional mobility status and prevented him from retruning home at [facility]. Initiated PT program for balance, mobility and NMR in order to restore physical function and facilitate DC at highest level of indep.  D/t the severity of pt's presentation, anticipate that pt will require significant assist upon DC. Will reassess as appropriate, however at this time pt expected to DC at [facility] level.      Occupational therapy: FIMS:  Eatin - Staff performs feeding for patient or patient requires IV fluids for hydration/TPN/PPN (npo)  Grooming:  (Activity did not occur)  Bathing:  (Refused. Activity did not occur)  Dressing-Upper:  (Wearing a gown.  Activity did not occur)  Dressing-Lower:  (Activity did not occur)  Toiletin - Total assist  Toilet Transfer: 3 - Requires 25-49% assist getting off toilet  Shower Transfer: 0 - Activity does not occur,  ,       Speech therapy: FIMS: Comprehension: 3 - Patient understands basic needs 50-74% of the time  Expression: 2 - Expresses basic ideas/needs 25-49% of the time  Social Interaction: 3 - Patient appropriate  50%-74% of the time  Problem Solvin - Patient solves simple/routine tasks 25%-49%  Memory: 2 - Patient remembers 25%-49% of the time       Lab/X-ray studies reviewed, analyzed and discussed with patient and staff:               Recent Results          Recent Results (from the past 24 hour(s))   Comprehensive Metabolic Panel     Collection Time: 17 10:00 AM   Result Value Ref Range     Sodium 138 132 - 144 mEq/L     Potassium 3.6 3.5 - 5.1 mEq/L     Chloride 100 98 - 107 mEq/L     CO2 23 22 - 29 mEq/L     Anion Gap 15 (H) 7 - 13 mEq/L     Glucose 147 (H) 74 - 109 mg/dL     BUN 13 8 - 23 mg/dL     CREATININE 0.73 0.70 - 1.20 mg/dL     GFR Non- >60.0 >60     GFR  >60.0 >60     Calcium 8.7 8.6 - 10.2 mg/dL     Total Protein 6.6 6.4 - 8.1 g/dL     Alb 3.4 (L) 3.9 - 4.9 g/dL     Total Bilirubin 0.7 0.0 - 1.2 mg/dL     Alkaline Phosphatase 459 (H) 35 - 104 U/L     ALT 27 0 - 41 U/L     AST 27 0 - 40 U/L     Globulin 3.2 2.3 - 3.5 g/dL   POCT Glucose     Collection Time: 17 11:34 AM   Result Value Ref Range     POC Glucose 176 (H) 60 - 115 discussion regarding their progress.     Complex Physical Medicine & Rehab Issues Assess & Plan:                1. Severe abnormality of gait and mobility and impaired self-care and ADL's with severe cognitive deficits right hemiparesis aphasia dysphagia secondary to left superior frontal lobe hematoma . Functional and medical status reassessed regarding patients ability to participate in therapies and patient found to be able to participate in acute intensive comprehensive inpatient rehabilitation program including PT/OT to improve balance, ambulation, ADLs, and to improve the P/AROM. Therapeutic modifications regarding activities in therapies, place, amount of time per day and intensity of therapy made daily. In bed therapies or bedside therapies prn.   2. Bowel and Bladder dysfunction:  frequent toileting, ambulate to bathroom with assistance, check post void residuals. Check for C.difficile x1 if >2 loose stools in 24 hours, continue bowel & bladder program.  Monitor bowel and bladder function. Lactinex 2 PO every AC. MOM prn, Brown Bomb prn, Glycerin suppository prn, enema prn.  3. Severe Postop pain to tenderness in the abdomen  low back pain generalized OA pain: reassess pain every shift and prior to and after each therapy session, give prn Tylenol and  scheduled methadone which she's been on long-term prescription monitoring reviewed doses adjusted because of neurologic injury and advanced age, modalities prn in therapy, Lidoderm, K-pad prn. Add abdominal binder  4. Skin healing and breakdown risk:  continue pressure relief program.  Daily skin exams and reports from nursing. 5. Fatigue due to nutritional and hydration deficiency:  continue to monitor I&Os, calorie counts prn, dietary consult prn. IV fluids until patient able to use the PEG tube he is currently nothing by mouth  6. Acute episodic insomnia with situational adjustment disorder:  prn Ambien, monitor for day time sedation.   7. Falls risk elevated:  patient to use call light to get nursing assistance to get up, bed and chair alarm. 8. Elevated DVT risk: progressive activities in PT, continue prophylaxis IHSAN hose, elevation and  no blood thinners because of recent bleed in the brain . 9. Complex discharge planning: discharge date is set for 10/12/17 to home alone with 24 hour supervision and home health PT, OT, RN, aide and . Weekly team meeting every  Thursday's  to assess progress towards goals, discuss and address social, psychological and medical comorbidities and to address difficulties they may be having progressing in therapy. Patient and family education is in progress. The patient is to follow-up with their family physician after discharge.         Complex Active General Medical Issues that complicate care Assess & Plan: 1.   Intraparenchymal hemorrhage of brain Lobar cerebral hemorrhage   TBI (traumatic brain injury),  Traumatic Lt superior frontal lobe hematoma-with high tone issues - consult Dr. Viola Justin. [facility] thinners until seen by Dr. Michael Phillips and limited other toxic sedating medications  2.   Hypertension, atrial fibrillation, hyperlipidemia - continue blood pressure checks, adjust/add medications (Lipitor, Lisinopril, Lopressor). 3.   Type 2 diabetes mellitus   with  Diabetic neuropathy - Continue blood sugar checks, 1500-calorie ADA diet, adjust/add medications hold Glucophage until patient by mouth  His blood sugars looked good 111-92-patient may not need Glucophage as his blood sugars have been at perfect consistent here  5.   History of CVA (cerebrovascular accident), with residual Hemiparesis, left -coordinate advanced nuanced rehabilitation with  [facility] accredited acute rehabilitation team  6.   Severe cognitive deficits as well as Aphasia - speech therapy consult.    7.   BPH (benign prostatic hypertrophy) - Flomax.  Dose  at at bedtime and check postvoid

## 2017-09-14 NOTE — PROGRESS NOTES
Physical Therapy  Facility/Department: [facility]  Rehabilitation Daily Treatment Note    NAME: Yeison Michael    : 10/28/1929 (89 y.o.)  MRN: [id number]    Account: [de-identified]  Gender: male    Date of Service: 17      SUBJECTIVE: Pt able to state \"no\" when assessing for pain. Start of tx pain 0/10  End of tx pain 0/10    OBJECTIVE:    Bed Mobility:   NT in PM    Transfers:   NT - not focus of tx session    Gait/Ambulation:   NT d/t safety concerns    Stair Negotiation:   NT d/t safety concerns    WC Training:   Reviewed locking and unlocking brakes. SBA-Min w/ L brake, dependent for R brake  Pre-WC propulsion activities include placing b/l sliders under feet to promote knee flex/ext in order to use feet to assist in steering, propulsion, and retro pulsion. Propulsion 5' fwd and 5' bkwd x3 trials w/ Mod-Max assist w/ b/l LE's and LUE  Pt unable to perform safe change in direction. Reviewed positional changing w/ max-dep assist for pressure relief. PLAN:   Continue per POC      Comments:   Pt requiring maximal VC/TC's to keep eyes open and remain awake throughout tx session. Multiple repeated cues needed for sequencing and instruction. Pt able to utilize RLE, however has some neglect and requires cues for usage.     Falls Safety Armband Present: [x] Yes  [] No    Safety/Judgment: Poor             Minutes: 30       WC Trainin    Therapy Time:    Individual   Time In 1330   Time Out 1400   Minutes 30       Electronically signed by Tyron Cowden, PTA on 2017 at 1:33 PM

## 2017-09-14 NOTE — PROGRESS NOTES
Progress Note  Patient: Kate Ventura  Unit/Bed: R501/U169-11  YOB: 1929  MRN: [id number]  Acct: [de-identified]   Admitting Diagnosis: TBI (traumatic brain injury) [facility]) [H06.1S4T]  Abnormality of gait and mobility [R26.9]  Admit Date:  9/12/2017  Hospital Day: 2    Chief Complaint:     87yoM Cerebral blled/CVA with residula right sided Hemiparesis admitted for rehab. Pt received PEG yesterdya due to inablity to swallow.     He is noted to be hypertensive. He has no complaints of h/a cp dyspnea    9/14/17:  Sleeping quietly. Alert upon exam.  No cpmplaints. BP much improved with ACE-I/HCTZ + BB      Review of Systems:   Review of Systems    Constitutional: Negative for diaphoresis and fatigue. HENT: Negative. Eyes: Negative. Respiratory: Negative. Negative for cough, chest tightness, shortness of breath, wheezing and stridor. Cardiovascular: Negative. Negative for chest pain, palpitations and leg swelling. Gastrointestinal: Negative. Negative for blood in stool and nausea. Genitourinary: Negative. Musculoskeletal: Negative. Skin: Negative. Neurological: Positive for weakness. Negative for dizziness, syncope and light-headedness. Hematological: Negative. Psychiatric/Behavioral: Negative  Physical Examination:    /72  Pulse 69  Temp 98 °F (36.7 °C) (Oral)   Resp 18  Ht 5' 7\" (1.702 m)  Wt 173 lb 11.6 oz (78.8 kg)  SpO2 96%  BMI 27.21 kg/m2   Physical Exam   Constitutional: He appears chronically ill. Neck: Neck supple. No adenopathy. Cardiovascular: Regular rhythm and intact distal pulses. Murmur heard. Pulmonary/Chest: Breath sounds normal. He has no wheezes. He has no rales. Abdominal: Soft. He exhibits no distension. Musculoskeletal: He exhibits no edema. Skin: Skin is warm.        LABS:  CBC:   Lab Results   Component Value Date    WBC 10.6 09/13/2017    RBC 4.57 09/13/2017    RBC 4.49 01/11/2012    HGB 14.2 09/13/2017    HCT 42.7 09/13/2017    MCV 93.4 09/13/2017    MCH 31.0 09/13/2017    MCHC 33.1 09/13/2017    RDW 14.1 09/13/2017     09/13/2017    MPV 10.1 10/26/2015     CBC with Differential:    Lab Results   Component Value Date    WBC 10.6 09/13/2017    RBC 4.57 09/13/2017    RBC 4.49 01/11/2012    HGB 14.2 09/13/2017    HCT 42.7 09/13/2017     09/13/2017    MCV 93.4 09/13/2017    MCH 31.0 09/13/2017    MCHC 33.1 09/13/2017    RDW 14.1 09/13/2017    LYMPHOPCT 7.7 09/03/2017    MONOPCT 8.1 09/03/2017    BASOPCT 0.3 09/03/2017    MONOSABS 0.9 09/03/2017    LYMPHSABS 0.8 09/03/2017    EOSABS 0.0 09/03/2017    BASOSABS 0.0 09/03/2017     CMP:    Lab Results   Component Value Date     09/13/2017    K 4.0 09/13/2017     09/13/2017    CO2 21 09/13/2017    BUN 14 09/13/2017    CREATININE 0.83 09/13/2017    GFRAA >60.0 09/13/2017    LABGLOM >60.0 09/13/2017    GLUCOSE 94 09/13/2017    GLUCOSE 110 01/11/2012    PROT 6.6 09/11/2017    LABALBU 3.4 09/11/2017    CALCIUM 8.7 09/13/2017    BILITOT 0.7 09/11/2017    ALKPHOS 459 09/11/2017    AST 27 09/11/2017    ALT 27 09/11/2017     BMP:    Lab Results   Component Value Date     09/13/2017    K 4.0 09/13/2017     09/13/2017    CO2 21 09/13/2017    BUN 14 09/13/2017    LABALBU 3.4 09/11/2017    CREATININE 0.83 09/13/2017    CALCIUM 8.7 09/13/2017    GFRAA >60.0 09/13/2017    LABGLOM >60.0 09/13/2017    GLUCOSE 94 09/13/2017    GLUCOSE 110 01/11/2012     Magnesium:    Lab Results   Component Value Date    MG 1.7 09/03/2017     Troponin:    Lab Results   Component Value Date    TROPONINI <0.010 09/03/2017       EKG:      Assessment:    Active Hospital Problems    Diagnosis Date Noted    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

## 2017-09-14 NOTE — PLAN OF CARE
Problem: Falls - Risk of  Goal: Absence of falls  Outcome: Not Met This Shift  New interventions place three side rails up. Place the bed alarm zone on 2.

## 2017-09-14 NOTE — PROGRESS NOTES
[facility]  Speech Language/Pathology  Rehab Daily Note                  Yu Diaz  9/14/2017    Rehab Dx/Hx: TBI (traumatic brain injury) ([facility] [address] [S06.9X9A]  Abnormality of gait and mobility [R26.9]    Precautions: falls    ST Dx: [x] Aphasia  [] Dysarthria  [] Apraxia   [] Oropharyngeal dysphagia              [x] Cognitive Impairment  [] Other:     Date of Admit: 9/12/2017  Room #: R255/R255-01    Time in: 1300 Time out: 1330  Subjective:  Behavior: [x] Alert  [x] Cooperative  []  Pleasant  [x] Confused  [] Agitated                                          [] Uncooperative  [] Distractible [] Motivated  [] Self-Limiting [] Anxious          [] Other:    Endurance:  [] Adequate for participation in SLP sessions  [] Reduced overall              [x] Lethargic  [] Other:              Interventions used this date:  [] Speech Treatment       [x] Expressive Language  [x] Receptive Language   [x] Dysphagia Treatment   [] Cognitive Skill Beto  [] Oral Motor  [] Voice Treatment       []  AAC     [] ESTIM  [] Speech production       [] Therapeutic Meal Monitor               [] Instruction in compensatory strategies       Objective/Assessment:  Pt more alert today, oriented to self only. Appropriately participated in simple, friendly conversation. Named items in images with mod verbal cues and 75% acc. Answered simple wh- questions with 75% acc and mod verbal cues. Completed laryngeal strengthening exercises 10x/each following model. Completed OM exercises 10x/each following model.          Treatment/Activity Tolerance:     [x]  Patient tolerated treatment well []  Patient limited by fatique    []  Patient limited by pain  []  Patient limited by other medical complications   []  Other:     Plan: [x]  Continue per plan of care []  Alter current plan (see comments)    []  Plan of care initiated []  Hold pending MD visit []  Discharge    Pain:  [x] Pt demonstrated no s/s of pain during this visit.  none  N/A      Patient/ Caregiver Education:  [x] Patient/Caregiver Educated on session and progression towards goals. [] Patient/Caregiver stated verbal understanding of directions.    [x] Reinforcement needed;  [x] patient   [] family    Safety Devices:  [] Call light within reach  [x] Chair alarm activated  [] Bed alarm activated  [] Other:    Comprehension          Expression   []7 - Independent   []7 - Indpendent   []6 - Modified Independent  []6 - Modified Independent   []5 - Supervision   []5 - Supervision   []4 - Min Assist   []4 - Min Assist   []3 - Mod Assist   [x]3 - Mod Assist   [x]2 - Max Assist   []2 - Max Assist   []1 - Dependent   []1 - Dependent    Problem Solving        Memory   []7 - Independent   []7 - Independent   []6 - Modified Independent  []6 - Modified Independent   []5 - Supervision   []5 - Supervision   []4 - Min Assist   []4 - Min Assist   []3 - Mod Assist   []3 - Mod Assist   []2 - Max Assist   []2 - Max Assist   [x]1 - Dependent   [x] 1 -Dependent    Rogerio Briscoe M.S., CF-SLP

## 2017-09-15 NOTE — PROGRESS NOTES
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 He complains of abdominal cramps-especially with tube feeds I'll reconsult Dr. Ronnie Bhatt regarding the pain. The patient transitioned to change to bolus Glucerna feedings-and every before meals and at bedtime blood sugar checks.      ROS x10: The patient also complains of severely impaired mobility and activities of daily living. Otherwise no new problems with vision, hearing, nose, mouth, throat, dermal, cardiovascular, GI, , pulmonary, musculoskeletal, psychiatric or neurological. See Rehab H&P on Rehab chart dated 09/08/17.      Vital signs:                                     /83  Pulse 87  Temp 98 °F (36.7 °C) (Oral)   Resp 18  Ht 5' 7\" (1.702 m)  Wt 180 lb (81.6 kg)  SpO2 99%  BMI 28.19 kg/m2  I/O:                                                      PO/Intake:   Nothing by mouth due to profound dysphagia. PEG-tube feedings       PEG-tube bolus Glucerna feedings with water flush. Bowel/Bladder:  continent, no problems noted. General:                                         Patient is well developed, adequately nourished, non-obese and                                                                    well kempt. HEENT:                                           Severe thrush PERRLA, hearing intact to loud voice, external inspection of ear                                                                    and nose benign. Inspection of lips, tongue and gums benign  Musculoskeletal:                 No significant change in strength or tone. All joints stable. Inspection and palpation of digits and nails show no clubbing,                                                                      cyanosis or inflammatory conditions. Neuro/Psychiatric:              Affect: flat but pleasant. Alert and oriented to person, place and                                                                    situation. No significant change in deep tendon reflexes or                                                                    sensation  Lungs:                                            Diminished CTA-B. Respiration effort is normal at rest.     Heart:                                                        S1 = S2, RRR. No loud murmurs. Abdomen:                                      Soft,  PEG tube-tender, no enlargement of liver or spleen. Extremities:                                   No significant lower extremity edema or tenderness. Right UE infiltrated. Skin:                                                         Intact to general survey, no visualized or palpated problems.     Rehabilitation:  Physical therapy: FIMS:  Bed Mobility:       Transfers: Sit to Stand: Maximum Assistance  Stand to sit: Maximum Assistance  Bed to Chair: Dependent/Total (+2 assist)  Stand Pivot Transfers: Dependent/Total,  ,       FIMS: Bed, Chair, Wheel Chair: 1 - Requires >75% assitance to transfer  Walk: 1- Total Assistance (Subject less than 25%)  Distance Walked: 1ft  Wheel Chair: 1- Total Assistance (Subject less than 25%)  Distance Traveled in Wheel Chair: 10ft  Stairs: 0 - Did not occur,  , Assessment: Pt presenting with above outlined impairments which have negatively impacted his functional mobility status and prevented him from retruning home at [facility]. Initiated PT program for balance, mobility and NMR in order to restore physical function and facilitate DC at highest level of indep.  D/t the severity of pt's presentation, anticipate that pt will require significant assist upon DC. Will reassess as appropriate, however at this time pt expected to DC at [facility] level.      Occupational therapy: FIMS:  Eatin - Staff performs feeding for patient or patient requires IV fluids for hydration/TPN/PPN (npo)  Grooming:  (Activity did not occur)  Bathing:  (Refused. Activity did not occur)  Dressing-Upper:  (Wearing a gown.  Activity did not occur)  Dressing-Lower:  (Activity did not occur)  Toiletin - Total assist  Toilet Transfer: 3 - Requires 25-49% assist getting off toilet  Shower Transfer: 0 - Activity does not occur,  ,       Speech therapy: FIMS: Comprehension: 3 - Patient understands basic needs 50-74% of the time  Expression: 2 - Expresses basic ideas/needs 25-49% of the time  Social Interaction: 3 - Patient appropriate  50%-74% of the time  Problem Solvin - Patient solves simple/routine tasks 25%-49%  Memory: 2 - Patient remembers 25%-49% of the time       Lab/X-ray studies reviewed, analyzed and discussed with patient and staff:               Recent Results          Recent Results (from the past 24 hour(s))   Comprehensive Metabolic Panel     Collection Time: 17 10:00 AM   Result Value Ref Range     Sodium 138 132 - 144 mEq/L     Potassium 3.6 3.5 - 5.1 mEq/L     Chloride 100 98 - 107 mEq/L     CO2 23 22 - 29 mEq/L     Anion Gap 15 (H) 7 - 13 mEq/L     Glucose 147 (H) 74 - 109 mg/dL     BUN 13 8 - 23 mg/dL     CREATININE 0.73 0.70 - 1.20 mg/dL     GFR Non- >60.0 >60     GFR  >60.0 >60     Calcium 8.7 8.6 - 10.2 mg/dL     Total Protein 6.6 6.4 - 8.1 g/dL     Alb 3.4 (L) 3.9 - 4.9 g/dL     Total Bilirubin 0.7 0.0 - 1.2 mg/dL     Alkaline Phosphatase 459 (H) 35 - 104 U/L     ALT 27 0 - 41 U/L     AST 27 0 - 40 U/L     Globulin 3.2 2.3 - 3.5 g/dL   POCT Glucose     Collection Time: 17 11:34 AM   Result Value Ref Range     POC Glucose 176 (H) 60 - 115 mg/dl     Performed on ACCU-CHEK     POCT Glucose     plans were discussed along with barriers to progress and strategies to address these barriers, patient encouraged to continue to discuss discharge plans with .      Complex Physical Medicine & Rehab Issues Assess & Plan:                1. Severe abnormality of gait and mobility and impaired self-care and ADL's with severe cognitive deficits right hemiparesis aphasia dysphagia secondary to left superior frontal lobe hematoma . Functional and medical status reassessed regarding patients ability to participate in therapies and patient found to be able to participate in acute intensive comprehensive inpatient rehabilitation program including PT/OT to improve balance, ambulation, ADLs, and to improve the P/AROM. Therapeutic modifications regarding activities in therapies, place, amount of time per day and intensity of therapy made daily. In bed therapies or bedside therapies prn.   2. Bowel and Bladder dysfunction:  frequent toileting, ambulate to bathroom with assistance, check post void residuals. Check for C.difficile x1 if >2 loose stools in 24 hours, continue bowel & bladder program.  Monitor bowel and bladder function. Lactinex 2 PO every AC. MOM prn, Brown Bomb prn, Glycerin suppository prn, enema prn.  3. Severe Postop pain to tenderness in the abdomen  low back pain generalized OA pain: reassess pain every shift and prior to and after each therapy session, give prn Tylenol and  scheduled methadone which she's been on long-term prescription monitoring reviewed doses adjusted because of neurologic injury and advanced age, modalities prn in therapy, Lidoderm, K-pad prn. Add abdominal binder  4. Skin healing and breakdown risk:  continue pressure relief program.  Daily skin exams and reports from nursing. 5. Fatigue due to nutritional and hydration deficiency:  continue to monitor I&Os, calorie counts prn, dietary consult prn.   IV fluids until patient able to use the PEG tube he is currently

## 2017-09-15 NOTE — PROGRESS NOTES
[facility] Occupational Therapy      Date: 9/15/2017  Patient Name: Eddie Santoyo        MRN: [id number]  Account: [de-identified]   : 10/28/1929  (80 y.o.)  Room: [place] [address]    Attempted OT tx at 11:00 AM but withheld d/t pt. experiencing significant abdominal pain and unable to par.ticipate in tx. at a functional therapeutic level. Nursing is aware of pt. current status. Pt. was left in front of the nurses station to be monitored and for pt. safety. ANUPAM Browne present. [] Hold per ns request    [] Pt declined     [] Pt. . off floor for test/procedure. Will attempt again when able.     Electronically signed by MANISHA Santoyo on 9/15/2017 at 11:33 AM

## 2017-09-15 NOTE — PROGRESS NOTES
[facility] Occupational Therapy      Date: 9/15/2017  Patient Name: Dominick Art        MRN: [id number]  Account: [de-identified]   : 10/28/1929  (80 y.o.)  Room: [place] [address]    Attempted OT tx at 2:30PM, but pt. unavailable 2° to:    [x] Hold per nsg request    [] Pt declined     [] Pt. . off floor for test/procedure. Will attempt again when able.     Electronically signed by MANISHA Koo on 9/15/2017 at 2:25 PM

## 2017-09-15 NOTE — PROGRESS NOTES
Apparently pt has been c/o pain, s/p peg placement. Tolerating g-tube feeding.   Will procede  With ct scan of abdonen

## 2017-09-15 NOTE — PROGRESS NOTES
Speech Language Pathology    NAME:  Yeison Michael  ROOM: M636/W593-08  :  10/28/1929  DATE: 9/15/2017      Speech Therapy attempted to see Yeison Michael on this date for a/an:    [] Bedside Swallow Evaluation   [] Speech/Language Evaluation   [] Modified Barium Swallow   [x] Treatment    Pt was unable to be seen due to patient:   [x] Currently off unit for CT scan    [] Refused   [] Too lethargic to participate    [] NPO for testing   [] On Bipap   [] Nursing Deferred:   [] Other:        Mitchell Flor M.S., CF-SLP

## 2017-09-15 NOTE — PROGRESS NOTES
Physical Therapy  Facility/Department: [facility] Daily Treatment Note    NAME: Eddie Santoyo    : 10/28/1929 (46 y.o.)  MRN: [id number]    Account: [de-identified]  Gender: male    Date of Service: 09/15/17      SUBJECTIVE: No pain reported but reporting significant stomach nausea. Pt groaning and grunting during entire Tx. Difficulty obtaining responses to questions. Start of tx pain 0/10    End of tx pain 0/10      OBJECTIVE:    Bed Mobility:   Not tested      Transfers:   NT      Gait/Ambulation:   Unsafe    Curb Step:  Unsafe     Stair Negotiation:  NT unsafe    Exercise: The below exercises were completed to facilitate strength, motor control   and muscular endurance. Seated:  AP: attempted but unable  LAQ: x5, x3  Hip Flex: 2x5  HS Curls:  LLE x5      Neuromuscular Re-Education/Balance:   NT      PLAN:   Continue per POC      Comments:    Pt significantly limited today by stomach discomfort/nausea. Pt presents seated in WC in moderate flexion with several paper towels scattered around. Provided pt with vomit receptacle and hand towels as pt was repeatedly spitting and \"heaving\" in effort to evacuate stomach contents; only managing to spit up mucous. Attempted seated exercises but pt would be interrupted after 3-5 reps by violent stomach reactions. Nursing reports that pt had been in bed all morning and was just recently moved into WC. PT aide reports that pt was hanging off EOB while foot and bed rails were elevated. Did not attempted WC mobility 2° to pt status      Falls Safety Armband Present: [x] Yes  [] No    Safety/Judgment:    Poor+             Minutes: 30      Transfer/Bed mobility training:       Gait training:       Neuro re education:       Therapeutic ex: 15      VARIANCE: 15 mins: difficulty maintaining pt focus 2° to stomach issues.      Therapy Time:    Individual   Time In 1030   Time Out 1100   Minutes 30       Electronically signed by Raymundo Raza PTA on 9/15/2017 at 5:09 PM

## 2017-09-16 NOTE — PROGRESS NOTES
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 He complains of abdominal cramps-especially with tube feeds I'll reconsult Dr. Iman Quintana regarding the pain. The patient transitioned to change to bolus Glucerna feedings-and every before meals and at bedtime blood sugar checks.      ROS x10: The patient also complains of severely impaired mobility and activities of daily living. Otherwise no new problems with vision, hearing, nose, mouth, throat, dermal, cardiovascular, GI, , pulmonary, musculoskeletal, psychiatric or neurological. See Rehab H&P on Rehab chart dated 09/08/17.      Vital signs:                                     /83  Pulse 87  Temp 98 °F (36.7 °C) (Oral)   Resp 18  Ht 5' 7\" (1.702 m)  Wt 180 lb (81.6 kg)  SpO2 99%  BMI 28.19 kg/m2  I/O:                                                      PO/Intake:   Nothing by mouth due to profound dysphagia. PEG-tube feedings       PEG-tube bolus Glucerna feedings with water flush. Bowel/Bladder:  continent, no problems noted. General:                                         Patient is well developed, adequately nourished, non-obese and                                                                    well kempt. HEENT:                                           Severe thrush PERRLA, hearing intact to loud voice, external inspection of ear                                                                    and nose benign. Inspection of lips, tongue and gums benign  Musculoskeletal:                 No significant change in strength or tone. All joints stable. Inspection and palpation of digits and nails show no clubbing,                                                                      cyanosis or inflammatory conditions. Neuro/Psychiatric:              Affect: flat but pleasant. Alert and oriented to person, place and                                                                    situation. No significant change in deep tendon reflexes or                                                                    sensation  Lungs:                                            Diminished CTA-B. Respiration effort is normal at rest.     Heart:                                                        S1 = S2, RRR. No loud murmurs. Abdomen:                                      Soft,  PEG tube-tender, no enlargement of liver or spleen. Extremities:                                   No significant lower extremity edema or tenderness. Right UE infiltrated. Skin:                                                         Intact to general survey, no visualized or palpated problems.     Rehabilitation:  Physical therapy: FIMS:  Bed Mobility:       Transfers: Sit to Stand: Maximum Assistance  Stand to sit: Maximum Assistance  Bed to Chair: Dependent/Total (+2 assist)  Stand Pivot Transfers: Dependent/Total,  ,       FIMS: Bed, Chair, Wheel Chair: 1 - Requires >75% assitance to transfer  Walk: 1- Total Assistance (Subject less than 25%)  Distance Walked: 1ft  Wheel Chair: 1- Total Assistance (Subject less than 25%)  Distance Traveled in Wheel Chair: 10ft  Stairs: 0 - Did not occur,  , Assessment: Pt presenting with above outlined impairments which have negatively impacted his functional mobility status and prevented him from [facility]runing home at [facility]. Initiated PT program for balance, mobility and NMR in order to restore physical function and facilitate DC at highest level of indep.  D/t the severity of pt's presentation, anticipate that pt will require significant assist upon DC. Will reassess as appropriate, however at this time pt expected to DC at [facility] level.      Occupational therapy: FIMS:  Eatin - Staff performs feeding for patient or patient requires IV fluids for hydration/TPN/PPN (npo)  Grooming:  (Activity did not occur)  Bathing:  (Refused. Activity did not occur)  Dressing-Upper:  (Wearing a gown.  Activity did not occur)  Dressing-Lower:  (Activity did not occur)  Toiletin - Total assist  Toilet Transfer: 3 - Requires 25-49% assist getting off toilet  Shower Transfer: 0 - Activity does not occur,  ,       Speech therapy: FIMS: Comprehension: 3 - Patient understands basic needs 50-74% of the time  Expression: 2 - Expresses basic ideas/needs 25-49% of the time  Social Interaction: 3 - Patient appropriate  50%-74% of the time  Problem Solvin - Patient solves simple/routine tasks 25%-49%  Memory: 2 - Patient remembers 25%-49% of the time       Lab/X-ray studies reviewed, analyzed and discussed with patient and staff:               Recent Results          Recent Results (from the past 24 hour(s))   Comprehensive Metabolic Panel     Collection Time: 17 10:00 AM   Result Value Ref Range     Sodium 138 132 - 144 mEq/L     Potassium 3.6 3.5 - 5.1 mEq/L     Chloride 100 98 - 107 mEq/L     CO2 23 22 - 29 mEq/L     Anion Gap 15 (H) 7 - 13 mEq/L     Glucose 147 (H) 74 - 109 mg/dL     BUN 13 8 - 23 mg/dL     CREATININE 0.73 0.70 - 1.20 mg/dL     GFR Non- >60.0 >60     GFR  >60.0 >60     Calcium 8.7 8.6 - 10.2 mg/dL     Total Protein 6.6 6.4 - 8.1 g/dL     Alb 3.4 (L) 3.9 - 4.9 g/dL     Total Bilirubin 0.7 0.0 - 1.2 mg/dL     Alkaline Phosphatase 459 (H) 35 - 104 U/L     ALT 27 0 - 41 U/L     AST 27 0 - 40 U/L     Globulin 3.2 2.3 - 3.5 g/dL   POCT Glucose     Collection Time: 17 11:34 AM   Result Value Ref Range     POC Glucose 176 (H) 60 - 115 mg/dl     Performed on ACCU-CHEK     POCT Glucose     plans were discussed along with barriers to progress and strategies to address these barriers, patient encouraged to continue to discuss discharge plans with .      Complex Physical Medicine & Rehab Issues Assess & Plan:                1. Severe abnormality of gait and mobility and impaired self-care and ADL's with severe cognitive deficits right hemiparesis aphasia dysphagia secondary to left superior frontal lobe hematoma . Functional and medical status reassessed regarding patients ability to participate in therapies and patient found to be able to participate in acute intensive comprehensive inpatient rehabilitation program including PT/OT to improve balance, ambulation, ADLs, and to improve the P/AROM. Therapeutic modifications regarding activities in therapies, place, amount of time per day and intensity of therapy made daily. In bed therapies or bedside therapies prn.   2. Bowel and Bladder dysfunction:  frequent toileting, ambulate to bathroom with assistance, check post void residuals. Check for C.difficile x1 if >2 loose stools in 24 hours, continue bowel & bladder program.  Monitor bowel and bladder function. Lactinex 2 PO every AC. MOM prn, Brown Bomb prn, Glycerin suppository prn, enema prn.  3. Severe Postop pain to tenderness in the abdomen  low back pain generalized OA pain: reassess pain every shift and prior to and after each therapy session, give prn Tylenol and  scheduled methadone which she's been on long-term prescription monitoring reviewed doses adjusted because of neurologic injury and advanced age, modalities prn in therapy, Lidoderm, K-pad prn. Add abdominal binder  4. Skin healing and breakdown risk:  continue pressure relief program.  Daily skin exams and reports from nursing. 5. Fatigue due to nutritional and hydration deficiency:  continue to monitor I&Os, calorie counts prn, dietary consult prn.   IV fluids until patient able to use the PEG tube he is currently Hemiparesis, left -coordinate advanced nuanced rehabilitation with  [facility] accredited acute rehabilitation team  5.   Severe cognitive deficits as well as Aphasia - speech therapy consult. 6.   BPH (benign prostatic hypertrophy) - Flomax.  Dose  at at bedtime and check postvoid residual  7.   Chronic back pain dt   Osteoarthritis- K pad Lidoderm prophylactic Tylenol on a scheduled basis patient is artery on high-dose methadone on a regular basis and may not be tolerating that much longer as he ages we will attempt to wean him to a lower dose if tolerated  8.   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 I have decreased dose of methadone because of severe sedation and advancing age-his pain seems to be a day and he is much more alert and awake. 9.   Moderate episode of recurrent major depressive disorder - emotional support, adjust/add medications (Celexa). 10. Right neglect, high tone -   I plan to slowly introduce Zanaflex.  Monitor liver function test and for over medication-I will hold Zanaflex until he is able to give his PEG tube in  11. Severe thrush Yeast on tongue - I prescribed cinnamon oil. 12. Acute UTI, UA on 09/08/17 showed moderate leukocyte cece check postvoid residual prescribed Macrobid check urine C AND S.   13. Severe oral pharyngeal dysphagia  patent tube feeding transition to bolus feeds, Update he is now status post PEG tube placement. NPO. Re-consult dietary.    14. Abdominal cramps.        Vasile Chambers MD covering        Kala Woodson D.O., PM&R                                                                 Attending                                         Manuel Brito

## 2017-09-16 NOTE — FLOWSHEET NOTE
Assumed care of patient. Patient moved from room 255 to room 239 d/t patient yelling out and thrashing around in bed and sliding down to bottom of bed per staff. Patient sitting at desk with staff when not in therapy. Patient calm and cooperative at present time.

## 2017-09-16 NOTE — PROGRESS NOTES
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 2017    Moderate episode of recurrent major depressive disorder ([facility] 2017    Atrial fibrillation (HCC)     Hemiparesis, left (HCC)     TBI (traumatic brain injury) ([facility]     Traumatic Lt superior frontal lobe hematoma     Abnormality of gait and mobility due to TBI Lt superior frontal lobe hematoma, [facility] admit 17     Aphasia     BPH (benign prostatic hypertrophy)     Chronic back pain     Hyperlipidemia     Pacemaker     Lobar cerebral hemorrhage ([facility] 2017    [facility]    Secondary hypertension     Dyslipidemia     Type 2 diabetes mellitus (HCC)     Diabetic neuropathy (HCC)     Vitamin D deficiency     Osteoarthritis     CVA (cerebrovascular accident) ([facility]        Restrictions  Restrictions/Precautions  Restrictions/Precautions: Fall Risk, General Precautions  Position Activity Restriction  Other position/activity restrictions: HOB 30degrees, Abd binder   NPO  Subjective   General  Chart Reviewed: Yes  Family / Caregiver Present: No  Referring Practitioner: Dr. Yovanny Dong   Diagnosis: TBI- left superior frontal lobe hematoma s/p fall with impaired mobility   Subjective  Subjective: Patient was lying in bed with his pants down around his hips but was unable to explain why. Pre Treatment Pain Screening  Pain at present: 0  Scale Used:  Faces  Intervention List: Patient able to continue with treatment  Pain Assessment  Patient Currently in Pain: No  Pain Assessment: Faces  Pain Level: 0  Effect of Pain on Daily Activities:  (Patient has pain in his Right Upper extremity to the touch.)  Vital Signs  Patient Currently in Pain: No Orientation  Orientation  Overall Orientation Status: Impaired  Orientation Level: Oriented to person  Objective  Patient able to place and remove large pegs in foam board with mod verbal cues to engage. Assessment      Activity Tolerance  Activity Tolerance: Patient Tolerated treatment well  Safety Devices  Safety Devices in place: Yes  Type of devices: All fall risk precautions in place       Discharge Recommendations:  Continue to assess pending progress     Plan   Plan  Times per week: 5-7 x/wk,  minutes   Times per day: Daily  Plan weeks: 3-6 weeks  Current Treatment Recommendations: Strengthening, ROM, Balance Training, Functional Mobility Training, Endurance Training, Neuromuscular Re-education, Self-Care / ADL, Pain Management, Cognitive Reorientation, Cognitive/Perceptual Training, Patient/Caregiver Education & Training, Equipment Evaluation, Education, & procurement, Safety Education & Training  G-Code     OutComes Score                                                    Goals  Patient Goals   Patient goals : Pt nodded when ask if Pt would like to get stronger. Therapy Time   Individual Concurrent Group Co-treatment   Time In 1030         Time Out 1100         Minutes 30            Variance: 30  Reason:  (Patient was not up and dressed according to the schedule.  RAMIREZ was doubled due to the fact the previous patient was also not up and dressed and now behind schedule.)    JAMES Garrett/L  Electronically signed by MANISHA Garrett on 9/16/17 at 11:29 AM

## 2017-09-16 NOTE — PROGRESS NOTES
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 2017    Moderate episode of recurrent major depressive disorder ([facility] 2017    Atrial fibrillation (HCC)     Hemiparesis, left (HCC)     TBI (traumatic brain injury) ([facility]     Traumatic Lt superior frontal lobe hematoma     Abnormality of gait and mobility due to TBI Lt superior frontal lobe hematoma, [facility] admit 17     Aphasia     BPH (benign prostatic hypertrophy)     Chronic back pain     Hyperlipidemia     Pacemaker     Lobar cerebral hemorrhage ([facility] 2017    [facility]    Secondary hypertension     Dyslipidemia     Type 2 diabetes mellitus (HCC)     Diabetic neuropathy (HCC)     Vitamin D deficiency     Osteoarthritis     CVA (cerebrovascular accident) ([facility]        Restrictions  Restrictions/Precautions  Restrictions/Precautions: Fall Risk, General Precautions  Position Activity Restriction  Other position/activity restrictions: HOB 30degrees, Abd binder   NPO  Subjective   General  Chart Reviewed: Yes  Family / Caregiver Present: No  Referring Practitioner: Dr. Faheem Lainez   Diagnosis: TBI- left superior frontal lobe hematoma s/p fall with impaired mobility   Subjective  Subjective: Patient was lying in bed with his pants down around his hips but was unable to explain why. Pre Treatment Pain Screening  Pain at present: 0  Scale Used:  Faces  Intervention List: Patient able to continue with treatment  Pain Assessment  Patient Currently in Pain: No  Pain Assessment: 0-10  Pain Level: 0  Effect of Pain on Daily Activities:  (Patient has pain in his Right Upper extremity to the touch.)  Vital Signs  Patient Currently in Pain: No Orientation  Orientation  Overall Orientation Status: Impaired  Orientation Level: Oriented to person  Objective  Patient engaged in L UE hand strengthening by placing and removing resistive clothes pins from the rods after verbal instruction. Patient had a visit from two grand daughters today while he was on the nursing unit. Patient engaged in a table top activity to place and remove large pegs in the foam board with AROM and crossing midline. Patient requires cues for midline seated positioning. Patient left in the care of the RN Jose Diane. Assessment      Activity Tolerance  Activity Tolerance: Patient Tolerated treatment well  Safety Devices  Safety Devices in place: Yes  Type of devices: All fall risk precautions in place       Discharge Recommendations:  Continue to assess pending progress     Plan   Plan  Times per week: 5-7 x/wk,  minutes   Times per day: Daily  Plan weeks: 3-6 weeks  Current Treatment Recommendations: Strengthening, ROM, Balance Training, Functional Mobility Training, Endurance Training, Neuromuscular Re-education, Self-Care / ADL, Pain Management, Cognitive Reorientation, Cognitive/Perceptual Training, Patient/Caregiver Education & Training, Equipment Evaluation, Education, & procurement, Safety Education & Training  G-Code     OutComes Score                                                    Goals  Patient Goals   Patient goals : Pt nodded when ask if Pt would like to get stronger.         Therapy Time   Individual Concurrent Group Co-treatment   Time In 1500         Time Out 1607         Minutes 79              JAMES Lopez/L  Electronically signed by MANISHA Lopez on 9/16/17 at 4:19 PM

## 2017-09-16 NOTE — PROGRESS NOTES
Called for avasys or person for 1:1 for pt at 745, [phone], 828. No one is available and no avasys. Will cont to monitor.

## 2017-09-16 NOTE — PROGRESS NOTES
Physical Therapy  Facility/Department: [facility]  Rehabilitation Daily Treatment Note    NAME: Josesito Landrum    : 10/28/1929 (37 y.o.)  MRN: [id number]    Account: [de-identified]  Gender: male    Date of Service: 17      SUBJECTIVE: Pt responding to questions today. Reports feel \"fair\" and having \"a little bit\" of pain. Start of tx pain unable to rate. End of tx pain 0/10    OBJECTIVE:    Bed Mobility:   Rolling: Stand by Assist to Daryel Filbert to L  Supine to sit: Moderate Assist  Sit to Supine: Minimal Assist to LEs, though assist needed to position once supine    Transfers:   Sit to stand: Moderate/Maximal Assist  Stand to sit: Maximal Assist  Chair to/from bed: Maximal Assist  Car Transfer: Not tested   Several trials of STS and SPT for technique with improving tolerance    Gait/Ambulation: NT    Exercise: The below exercises were completed to facilitate strength, motor control   and muscular endurance. Supine:  Bridges 2x10  SAQ x10 PROM on R    Rest breaks between sets and exercises for improved tolerance    Neuromuscular Re-Education/Balance:    the following techniques and tasks were performed in therapy session to facilitate normalized motor control, balance reactions and movement patterns        Static  // bars with 1 UE support up to 45\" MODA  Standing wt lateral shifting with 1 UE support MODA  Single stepping in // bars with 1 UE support with L only x7 MODA with therapist blocking R knee flexion    PLAN:   Continue per POC      Comments: Returned to pt room snf through tx secondary to soaked through brief and pants. Changed and cleaned by therapist and returned to gym. Good tolerance to tx otherwise.              Falls Safety Armband Present: [x] Yes  [] No    Safety/Judgment:  Poor safety awareness      Minutes: 55      Transfer/Bed mobility trainin      Gait training:      Neuro re education: 22      Therapeutic ex: 8    5 min variance for pant change     Therapy Time: Individual   Time In 1100   Time Out 1200   Minutes 60       Electronically signed by Breanna Lima PTA on 9/16/2017 at 12:06 PM

## 2017-09-16 NOTE — FLOWSHEET NOTE
Nursing supervisor notified several times during the night of need for avasyst or sitter care and stated none available. Patient very restless.

## 2017-09-17 NOTE — PROGRESS NOTES
plans were discussed along with barriers to progress and strategies to address these barriers, patient encouraged to continue to discuss discharge plans with .      Complex Physical Medicine & Rehab Issues Assess & Plan:                1. Severe abnormality of gait and mobility and impaired self-care and ADL's with severe cognitive deficits right hemiparesis aphasia dysphagia secondary to left superior frontal lobe hematoma . Functional and medical status reassessed regarding patients ability to participate in therapies and patient found to be able to participate in acute intensive comprehensive inpatient rehabilitation program including PT/OT to improve balance, ambulation, ADLs, and to improve the P/AROM. Therapeutic modifications regarding activities in therapies, place, amount of time per day and intensity of therapy made daily. In bed therapies or bedside therapies prn.   2. Bowel and Bladder dysfunction:  frequent toileting, ambulate to bathroom with assistance, check post void residuals. Check for C.difficile x1 if >2 loose stools in 24 hours, continue bowel & bladder program.  Monitor bowel and bladder function. Lactinex 2 PO every AC. MOM prn, Brown Bomb prn, Glycerin suppository prn, enema prn.  3. Severe Postop pain to tenderness in the abdomen  low back pain generalized OA pain: reassess pain every shift and prior to and after each therapy session, give prn Tylenol and  scheduled methadone which she's been on long-term prescription monitoring reviewed doses adjusted because of neurologic injury and advanced age, modalities prn in therapy, Lidoderm, K-pad prn. Add abdominal binder  4. Skin healing and breakdown risk:  continue pressure relief program.  Daily skin exams and reports from nursing. 5. Fatigue due to nutritional and hydration deficiency:  continue to monitor I&Os, calorie counts prn, dietary consult prn.   IV fluids until patient able to use the PEG tube he is currently nothing by mouth  6. Acute episodic insomnia with situational adjustment disorder:  prn Ambien, monitor for day time sedation. 7. Falls risk elevated:  patient to use call light to get nursing assistance to get up, bed and chair alarm. 8. Elevated DVT risk: progressive activities in PT, continue prophylaxis IHSAN hose, elevation and  no blood thinners because of recent bleed in the brain . 9. Complex discharge planning: discharge date is set for 10/12/17 to home alone with 24 hour supervision and home health PT, OT, RN, aide and . Weekly team meeting every  Thursday's  to assess progress towards goals, discuss and address social, psychological and medical comorbidities and to address difficulties they may be having progressing in therapy. Patient and family education is in progress. The patient is to follow-up with their family physician after discharge.         Complex Active General Medical Issues that complicate care Assess & Plan: 1.   Intraparenchymal hemorrhage of brain Lobar cerebral hemorrhage   TBI (traumatic brain injury),  Traumatic Lt superior frontal lobe hematoma-with high tone issues - consult Dr. Kirit Jimenez. [facility] thinners until seen by Dr. Martha Newberry and limited other toxic sedating medications  2.   Hypertension, atrial fibrillation, hyperlipidemia - continue blood pressure checks, adjust/add medications (Lipitor, Lisinopril, Lopressor). 3.   Type 2 diabetes mellitus   with  Diabetic neuropathy - Continue blood sugar checks every AC and HS, 1500-calorie ADA diet, adjust/add medications hold Glucophage until patient by mouth  His blood sugars looked good 111-92-patient may not need Glucophage as his blood sugars have been at perfect consistent here.   Recent Labs      09/15/17   2040  09/16/17   0636  09/16/17   1233  09/16/17   1617  09/16/17   2041   POCGLU  217*  126*  152*  185*  158*      History of CVA (cerebrovascular accident), with residual Hemiparesis, left -coordinate advanced nuanced rehabilitation with  [facility] accredited acute rehabilitation team  5.   Severe cognitive deficits as well as Aphasia - speech therapy consult. 6.   BPH (benign prostatic hypertrophy) - Flomax.  Dose  at at bedtime and check postvoid residual  7.   Chronic back pain dt   Osteoarthritis- K pad Lidoderm prophylactic Tylenol on a scheduled basis patient is artery on high-dose methadone on a regular basis and may not be tolerating that much longer as he ages we will attempt to wean him to a lower dose if tolerated  8.   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 I have decreased dose of methadone because of severe sedation and advancing age-his pain seems to be a day and he is much more alert and awake. 9.   Moderate episode of recurrent major depressive disorder - emotional support, adjust/add medications (Celexa). 10. Right neglect, high tone -   I plan to slowly introduce Zanaflex.  Monitor liver function test and for over medication-I will hold Zanaflex until he is able to give his PEG tube in  11. Severe thrush Yeast on tongue - I prescribed cinnamon oil. 12. Acute UTI, UA on 09/08/17 showed moderate leukocyte cece check postvoid residual prescribed Macrobid check urine C AND S.   13. Severe oral pharyngeal dysphagia  patent tube feeding transition to bolus feeds, Update he is now status post PEG tube placement. NPO. Re-consult dietary.    14. Abdominal cramps.        Loren Thornton MD covering        George Garzon D.O., PM&R                                                                 Attending                                         Manuel Brito

## 2017-09-17 NOTE — PROGRESS NOTES
Hospitalist Progress Note      PCP: Reena Bradley MD    Date of Admission: 2017      Subjective: No complaints    Medications:  Reviewed    Infusion Medications    dextrose      dextrose       Scheduled Medications    pantoprazole sodium  40 mg Oral BID AC    calcium carbonate  500 mg PEG Tube BID    lactobacillus acidophilus  2 tablet PEG Tube Daily    metoprolol tartrate  50 mg PEG Tube BID    lisinopril-hydrochlorothiazide  1 tablet PEG Tube Daily    cinnamon oil  1 drop Oral 4x Daily    insulin lispro  0-6 Units Subcutaneous TID WC    insulin lispro  0-3 Units Subcutaneous Nightly    tamsulosin  0.4 mg Oral Nightly    atorvastatin  10 mg PEG Tube Nightly    citalopram  10 mg PEG Tube Daily    fish oil  1,000 mg PEG Tube Lunch    magnesium oxide  400 mg PEG Tube Dinner    methadone  5 mg PEG Tube BID    nitrofurantoin (macrocrystal-monohydrate)  100 mg PEG Tube 2 times per day    lidocaine  3 patch Transdermal Daily     PRN Meds: dextrose, dextrose, glucagon (rDNA), glucose, acetaminophen, bisacodyl, dextrose, dextrose, fleet, glucagon (rDNA), glucose, guaiFENesin, magnesium hydroxide, acetaminophen, aluminum & magnesium hydroxide-simethicone, HYDROmorphone      Intake/Output Summary (Last 24 hours) at 17 1129  Last data filed at 17 0900   Gross per 24 hour   Intake              820 ml   Output                0 ml   Net              820 ml       Exam:  BP (!) 110/56  Pulse 65  Temp 98 °F (36.7 °C) (Oral)   Resp 16  Ht 5' 7\" (1.702 m)  Wt 173 lb 11.6 oz (78.8 kg)  SpO2 93%  BMI 27.21 kg/m2    Average, Min, and Max for last 24 hours Vitals:  TEMPERATURE:  Temp  Av °F (36.7 °C)  Min: 98 °F (36.7 °C)  Max: 98 °F (36.7 °C)    RESPIRATIONS RANGE: Resp  Av.3  Min: 16  Max: 18    PULSE RANGE: Pulse  Av  Min: 65  Max: 80    BLOOD PRESSURE RANGE:  Systolic (00VAQ), HGY:091 , Min:100 , QEU:586   ; Diastolic (08BJF), NZN:84, Min:56, Max:81      PULSE OXIMETRY RANGE: SpO2  Av.3 %  Min: 93 %  Max: 98 %    I/O last 3 completed shifts: In: 460 [NG/GT:460]  Out: -     General appearance: No apparent distress, appears stated age and cooperative. HEENT: Pupils equal, round, and reactive to light. Conjunctivae/corneas clear. Neck: Supple, with full range of motion. No jugular venous distention. Trachea midline. Respiratory:  Normal respiratory effort. Clear to auscultation, bilaterally without Rales/Wheezes/Rhonchi. Cardiovascular: Regular rate and rhythm with normal S1/S2 without murmurs, rubs or gallops. Abdomen: Soft, non-tender, non-distended with normal bowel sounds. Musculoskeletal: No clubbing, cyanosis or edema bilaterally. Full range of motion without deformity. Skin: Skin color, texture, turgor normal.  No rashes or lesions.   Neurologic:  grossly non-focal.  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

## 2017-09-18 NOTE — PROGRESS NOTES
show no clubbing,                                                                      cyanosis or inflammatory conditions. Neuro/Psychiatric:              Affect: flat but pleasant. Alert and oriented to person, place and                                                                    situation. No significant change in deep tendon reflexes or                                                                    sensation  Lungs:                                            Diminished CTA-B. Respiration effort is normal at rest.     Heart:                                                        S1 = S2, RRR. No loud murmurs. Abdomen:                                      Soft,  PEG tube-tender, no enlargement of liver or spleen. Extremities:                                   No significant lower extremity edema or tenderness. Right UE infiltrated. Skin:                                                         Intact to general survey, no visualized or palpated problems.     Rehabilitation:  Physical therapy: FIMS:  Bed Mobility:       Transfers: Sit to Stand: Maximum Assistance  Stand to sit: Maximum Assistance  Bed to Chair: Dependent/Total (+2 assist)  Stand Pivot Transfers: Dependent/Total,  ,       FIMS: Bed, Chair, Wheel Chair: 1 - Requires >75% assitance to transfer  Walk: 1- Total Assistance (Subject less than 25%)  Distance Walked: 1ft  Wheel Chair: 1- Total Assistance (Subject less than 25%)  Distance Traveled in Wheel Chair: 10ft  Stairs: 0 - Did not occur,  , Assessment: Pt presenting with above outlined impairments which have negatively impacted his functional mobility status and prevented him from retruning home at [facility]. Initiated PT program for balance, mobility and NMR in order to restore physical function and facilitate DC at highest level of indep. D/t the severity of pt's presentation, anticipate that pt will require significant assist upon DC.  Will reassess as appropriate, however at this time pt expected to DC at [facility] level.      Occupational therapy: FIMS:  Eatin - Staff performs feeding for patient or patient requires IV fluids for hydration/TPN/PPN (npo)  Grooming:  (Activity did not occur)  Bathing:  (Refused. Activity did not occur)  Dressing-Upper:  (Wearing a gown.  Activity did not occur)  Dressing-Lower:  (Activity did not occur)  Toiletin - Total assist  Toilet Transfer: 3 - Requires 25-49% assist getting off toilet  Shower Transfer: 0 - Activity does not occur,  ,       Speech therapy: FIMS: Comprehension: 3 - Patient understands basic needs 50-74% of the time  Expression: 2 - Expresses basic ideas/needs 25-49% of the time  Social Interaction: 3 - Patient appropriate  50%-74% of the time  Problem Solvin - Patient solves simple/routine tasks 25%-49%  Memory: 2 - Patient remembers 25%-49% of the time       Lab/X-ray studies reviewed, analyzed and discussed with patient and staff:               Recent Results          Recent Results (from the past 24 hour(s))   Comprehensive Metabolic Panel     Collection Time: 17 10:00 AM   Result Value Ref Range     Sodium 138 132 - 144 mEq/L     Potassium 3.6 3.5 - 5.1 mEq/L     Chloride 100 98 - 107 mEq/L     CO2 23 22 - 29 mEq/L     Anion Gap 15 (H) 7 - 13 mEq/L     Glucose 147 (H) 74 - 109 mg/dL     BUN 13 8 - 23 mg/dL     CREATININE 0.73 0.70 - 1.20 mg/dL     GFR Non- >60.0 >60     GFR  >60.0 >60     Calcium 8.7 8.6 - 10.2 mg/dL     Total Protein 6.6 6.4 - 8.1 g/dL     Alb 3.4 (L) 3.9 - 4.9 g/dL     Total Bilirubin 0.7 0.0 - 1.2 mg/dL     Alkaline Phosphatase 459 (H) 35 - 104 U/L     ALT 27 0 - 41 U/L     AST 27 0 - 40 U/L     Globulin 3.2 2.3 - 3.5 g/dL   POCT Glucose     Collection Time: 17 11:34 AM   Result Value Ref Range     POC Glucose 176 (H) 60 - 115 mg/dl     Performed on ACCU-CHEK     POCT Glucose     Collection Time: 17  4:53 PM   Result 1. Severe abnormality of gait and mobility and impaired self-care and ADL's with severe cognitive deficits right hemiparesis aphasia dysphagia secondary to left superior frontal lobe hematoma . Functional and medical status reassessed regarding patients ability to participate in therapies and patient found to be able to participate in acute intensive comprehensive inpatient rehabilitation program including PT/OT to improve balance, ambulation, ADLs, and to improve the P/AROM. Therapeutic modifications regarding activities in therapies, place, amount of time per day and intensity of therapy made daily. In bed therapies or bedside therapies prn.   2. Bowel and Bladder dysfunction incontinence:  frequent toileting, ambulate to bathroom with assistance, check post void residuals. Check for C.difficile x1 if >2 loose stools in 24 hours, continue bowel & bladder program.  Monitor bowel and bladder function. Lactinex 2 PO every AC. MOM prn, Brown Bomb prn, Glycerin suppository prn, enema prn.  3. Severe Postop pain to tenderness in the abdomen  low back pain generalized OA pain: reassess pain every shift and prior to and after each therapy session, give prn Tylenol and  scheduled methadone which she's been on long-term prescription monitoring reviewed doses adjusted because of neurologic injury and advanced age, modalities prn in therapy, Lidoderm, K-pad prn. Add abdominal binder  4. Skin healing at PEG site and breakdown risk: Betadine to incision 3 times a day cover with abdominal binder  continue pressure relief program.  Daily skin exams and reports from nursing. 5. Severe Fatigue due to nutritional and hydration deficiency:  continue to monitor I&Os, calorie counts prn, dietary consult prn. IV fluids until patient able to use the PEG tube he is currently nothing by mouth  6. Acute episodic insomnia with situational adjustment disorder:  prn Ambien, monitor for day time sedation.   7. Falls risk

## 2017-09-18 NOTE — FLOWSHEET NOTE
Patient was having an upset stomach this am , this morning OT was cancelled. Afternoon PT cancelled , patient sleeping, would not wake up for therapy.

## 2017-09-18 NOTE — PROGRESS NOTES
[facility] Occupational Therapy      Date: 2017  Patient Name: Kanu Dorantes        MRN: [id number]  Account: [de-identified]   : 10/28/1929  (80 y.o.)  Room: [place] [address]    Attempted OT tx at 3:13 PM, but pt. unavailable 2° to:    [] Hold per nsg request    [] Pt declined     [] Pt. . off floor for test/procedure. Will attempt again when able.     Electronically signed by MANISHA Swain on 2017 at 3:13 PM  -30 min. tx.

## 2017-09-18 NOTE — FLOWSHEET NOTE
Held lunch tube feeding per RN due to upset stomach and large amount of residual. Dietician to see pt.

## 2017-09-18 NOTE — PROGRESS NOTES
Physical Therapy  Facility/Department: [facility]  Physical Therapy Missed Treatment     NAME: Eddie Santoyo    : 10/28/1929 (50 y.o.)  MRN: [id number]    Account: [de-identified]  Gender: male        [x] Patient Declines PT Treatment:  Upon arrival to pt room pt lying on side. Pt awoke but refusing to participate. Pt closed eyes and would not respond. Notified nursing; Shy (RN) arrived to assist but pt did not move nor open eyes. Will attempt PT treatment again at earliest convenience.       Electronically signed by Raymundo Raza PTA on 17 at 5:13 PM

## 2017-09-18 NOTE — PROGRESS NOTES
Nutrition Assessment    Type and Reason for Visit: Reassess    Nutrition Recommendations:  Continue NPO, Modify current Tube Feeding                                                       Spoke with Shy BO, trial nocturnal TF Diabetic Formula @ 70 ml/hr x 10 hrs then bolus 3x day 240 ml  to see if abdominal pain improves, continue current flush 100 ml, 6 xdaily  Malnutrition Assessment:  · Malnutrition Status: Insufficient data  · Context: Acute illness or injury  · Findings of the 6 clinical characteristics of malnutrition (Minimum of 2 out of 6 clinical characteristics is required to make the diagnosis of moderate or severe Protein Calorie Malnutrition based on AND/ASPEN Guidelines):  1. Energy Intake-51% to 75%, greater than or equal to 5 days    2. Weight Loss-5% loss or greater,  (2 weeks)  3. Fat Loss- (differred due to combativness),    4. Muscle Loss-Unable to assess,    5. Fluid Accumulation-No significant fluid accumulation,    6.  Strength-Not measured    Nutrition Diagnosis:   · Problem: Swallowing difficulty  · Etiology: related to Cognitive or neurological impairment, Difficulty swallowing     Signs and symptoms:  as evidenced by Nutrition support - EN, Swallow study results    Nutrition Assessment:  · Subjective Assessment: Per RD pt c/o severe abdominal pain this morning, last BM 9/16, residuals ~ 50 ml, no N/V/D, unable to keep pt awake long enough to ask quesitons re: location of his pina. has received EN x 5 days and today is the first day of abdominal pain.  Staff reports PEG site does not show any signs of infections  · Nutrition-Focused Physical Findings: BM 9/16, no edema  · Wound Type: None  · Current Nutrition Therapies:  · Oral Diet Orders: NPO   · Tube Feeding (TF) Orders:   · Feeding Route: Gastrostomy  · Formula: Diabetic  · Rate (ml/hr):25    · Volume (ml/day): 360 ml, 4 x daily with 100 ml water flush 6x daily  · Duration: Bolus  · Additives/Modulars:  (none)  · TF Residuals:  (50 ml per RN today)  · Water Flushes:  (100 ml 6 x day)  · Current TF & Flush Orders Provides: 1440 ml, 1728 kcals, 86 g protein, 1760ml free water  · Goal TF & Flush Orders Provides: 1440 ml, 1728 kcals, 86 g protein, 1760ml free water  · Additional Calories: EN as ordered delivers  > 85% estimated needs  · Anthropometric Measures:  · Ht: 5' 7\" (170.2 cm)   · Current Body Wt:  (no new)  · Admission Body Wt: 180 lb (81.6 kg)  · Usual Body Wt: 186 lb (84.4 kg) (9/7/17)  · % Weight Change: 13# ( 6.9%),  2 weeks  · Ideal Body Wt: 142 lb (64.4 kg), % Ideal Body 122%  · BMI Classification: BMI 25.0 - 29.9 Overweight (27.3)  · Comparative Standards (Estimated Nutrition Needs):  · Estimated Daily Total Kcal: 9696-5048  · Estimated Daily Protein (g): 63-79  · Estimated Daily Fluid (ml/day): ~1800    Estimated Intake vs Estimated Needs: Intake Meets Needs    Nutrition Risk Level: Moderate    Nutrition Interventions:   Continue NPO, Modify current Tube Feeding (spoke with Shy BO, trial nocturanl TF Diabetic Formula @ 70 ml/hr x 10 hrs then bolus 3x day 240 ml  to see if abdominal pain improves)  Continued Inpatient Monitoring, Education not appropriate at this time    Nutrition Evaluation:   · Evaluation: Progressing toward goals   · Goals: EN to deliver > 85% estimated needs, no c/o abdominal pain   · Monitoring: TF Intake, TF Tolerance, Gastric Residuals, Weight, Comparative Standards    See Adult Nutrition Doc Flowsheet for more detail.      Electronically signed by Evin Rdz RD, LD on 9/18/17 at 3:26 PM

## 2017-09-18 NOTE — PROGRESS NOTES
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 2017    Atrial fibrillation (HCC)     Hemiparesis, left (HCC)     TBI (traumatic brain injury) ([facility]     Traumatic Lt superior frontal lobe hematoma     Abnormality of gait and mobility due to TBI Lt superior frontal lobe hematoma, [facility] admit 17     Aphasia     BPH (benign prostatic hypertrophy)     Chronic back pain     Hyperlipidemia     Pacemaker     [facility]    Intraparenchymal hemorrhage of brain ([facility] 2017    Secondary hypertension     Dyslipidemia     Type 2 diabetes mellitus (HCC)     Diabetic neuropathy (HCC)     Vitamin D deficiency     Osteoarthritis     CVA (cerebrovascular accident) ([facility]        Restrictions  Restrictions/Precautions  Restrictions/Precautions: Fall Risk, General Precautions  Other position/activity restrictions: HOB 30degrees, Abd binder   NPO  Subjective   General  Referring Practitioner: Dr. Lguo Cancer   Diagnosis: TBI- left superior frontal lobe hematoma s/p fall with impaired mobility     Pre Treatment Pain Screening  Comments / Details: Pt. would not respond to question regarding pain. Objective  Pt's granddaughter present this AM.   Tx. attempted but to no avail. Pt. would not participate in tx.  would not speak would just close his eyes. Pt. would not  his feet and put them on the w/c foot rests for which he is able to do himself. After 30 min. of attempted tx.  pt. was returned to his room and nursing informed. ANUPAM Anderson responded. Concerns regarding pt's poor participation in tx. discussed with pt's granddaughter. Assessment      Activity Tolerance  Activity Tolerance: would not participate in tx. Safety Devices  Safety Devices in place: Yes  Type of devices: All fall risk precautions in place       Discharge Recommendations:  Continue to assess pending progress     Plan   Plan  Times per week: 5-7 x/wk,  minutes   Times per day: Daily  Plan weeks: 3-6 weeks  Current Treatment Recommendations: Strengthening, ROM, Balance Training, Functional Mobility Training, Endurance Training, Neuromuscular Re-education, Self-Care / ADL, Pain Management, Cognitive Reorientation, Cognitive/Perceptual Training, Patient/Caregiver Education & Training, Equipment Evaluation, Education, & procurement, Safety Education & Training  Goals  Patient Goals   Patient goals : Pt nodded when ask if Pt would like to get stronger.         Therapy Time   Individual Concurrent Group Co-treatment   Time In 1100         Time Out 1130         Minutes 30             -30  AM Pt. returned to room d't lack of participation in OT.tx.    MANISHA Amador    Electronically signed by MANISHA Amador on 9/18/2017 at 2:32 PM

## 2017-09-18 NOTE — PROGRESS NOTES
[facility]  Speech Language/Pathology  Rehab Daily Note                  Saundra Flores  9/18/2017    Rehab Dx/Hx: TBI (traumatic brain injury) ([facility] [S06.9X9A]  Abnormality of gait and mobility [R26.9]    Precautions: falls    ST Dx: [] Aphasia  [] Dysarthria  [] Apraxia   [x] Oropharyngeal dysphagia              [] Cognitive Impairment  [] Other:     Date of Admit: 9/12/2017  Room #: E816/S695-92    Time in: 1000     Time out: 1015    Subjective:  Behavior: [] Alert  [] Cooperative  []  Pleasant  [] Confused  [] Agitated                                          [] Uncooperative  [] Distractible [] Motivated  [x] Self-Limiting [] Anxious          [] Other:    Endurance:  [] Adequate for participation in SLP sessions  [] Reduced overall              [x] Lethargic  [] Other:              Interventions used this date:  [] Speech Treatment       [] Expressive Language  [] Receptive Language   [x] Dysphagia Treatment   [] Cognitive Skill Beto  [] Oral Motor  [] Voice Treatment       []  AAC     [] ESTIM  [] Speech production       [] Therapeutic Meal Monitor               [] Instruction in compensatory strategies       Objective/Assessment:  With max verbal and tactile cues, pt stated name and birthday. Pt was 1 year off with birthday year. Pt kept eyes closed. Pt did not respond to questions or directions. Thermal tactile stim attempted however pt immediately closed mouth on lemon swab and needed max cues to open mouth so therapist could remove. Session ended early due to pt's poor participation and decreased level of alertness/responsiveness.            Treatment/Activity Tolerance:     []  Patient tolerated treatment well [x]  Patient limited by fatique    []  Patient limited by pain  []  Patient limited by other medical complications   []  Other:     Plan: [x]  Continue per plan of care []  Alter current plan (see comments)    []  Plan of care initiated []  Hold pending MD visit [] Discharge    Pain:  [x] Pt demonstrated no s/s of pain during this visit. Safety Devices:  [x] Call light within reach  [] Chair alarm activated  [x] Bed alarm activated  [] Other:    Comprehension          Expression   []7 - Independent   []7 - Indpendent   []6 - Modified Independent  []6 - Modified Independent   []5 - Supervision   []5 - Supervision   []4 - Min Assist   []4 - Min Assist   []3 - Mod Assist   []3 - Mod Assist   []2 - Max Assist   []2 - Max Assist   [x]1 - Dependent   [x]1 - Dependent    Problem Solving        Memory   []7 - Independent   []7 - Independent   []6 - Modified Independent  []6 - Modified Independent   []5 - Supervision   []5 - Supervision   []4 - Min Assist   []4 - Min Assist   []3 - Mod Assist   []3 - Mod Assist   []2 - Max Assist   []2 - Max Assist   [x]1 - Dependent   [x] 1 -Dependent    Quinn MAZARIEGOS, CCC-SLP 09/18/17

## 2017-09-18 NOTE — PROGRESS NOTES
In 1032   Time Out 1100   Minutes 28       Electronically signed by Brian Santos PTA on 9/18/2017 at 10:34 AM

## 2017-09-19 NOTE — PROGRESS NOTES
Physical Therapy  Facility/Department: [facility]  Rehabilitation Daily Treatment Note    NAME: Mercedes Yuan    : 10/28/1929 (45 y.o.)  MRN: [id number]    Account: [de-identified]  Gender: male    Date of Service: 17      SUBJECTIVE:     Start of tx pain 0/10    End of tx pain 0/10       OBJECTIVE:    Bed Mobility:   Not tested      Transfers:   Sit to stand: Maximal Assist > Moderate Assist  Stand to sit: Moderate Assist  Chair to/from bed: Not tested  Car Transfer: Not tested     *Sit>stand limited by decreased ROM with trunk flexion. Gait/Ambulation:   Assistive Device: Parallel bars   Assist Level: Maximal Assist  Distance: 3 steps, 7 steps, stood with Lat WS, retro 2 steps  Surface: linoleum/hardwood  Gait Deviations: Ataxia, Fair R knee stability, crouched gait, LUE support, Inconsistent SL R foot, max cues for gait pattern      Curb Step:  NT    Stair Negotiation:  NT    Exercise:  NT      Neuromuscular Re-Education/Balance:    NT      PLAN:   Continue per POC      Comments:    Increased time to complete gait activity in parallel bars; pt requires max encouragement to participate. Improved functional mobility. Pt is Max>Miod A to stand but once standing assist required lowers to Min. Pt refusing to fwd bend at hips 2° to pain in stomach (feeding tube site). During Tx pt grunts and groans when presented with instruction for individual activities but is eventually agreeable. Spoke to : reporting mild increase in participation noted today. Falls Safety Armband Present: [x] Yes  [] No    Safety/Judgment:    Poor            Minutes: 30      Transfer/Bed mobility training: 10      Gait trainin      Neuro re education:      Therapeutic ex:       Therapy Time:    Individual   Time In 1330   Time Out 1400   Minutes 30       Electronically signed by Keenan Wright PTA on 2017 at 5:13 PM

## 2017-09-19 NOTE — CARE COORDINATION
Facility/Department: [facility] CASE MANAGEMENT    NAME: Sabiha Preciado      : 10/28/1929  MRN: [id number]  [id number]      Social/Functional History  Social/Functional History  Lives With: Alone  Type of Home: House  Home Layout: Two level  Home Access: Stairs to enter with rails  Entrance Stairs - Number of Steps: 3  Bathroom Equipment: Shower chair (Pt nodded head yes when asked if he had a shower chair)  Home Equipment: Cane, Rolling walker  Receives Help From: Family  ADL Assistance: Independent  Homemaking Responsibilities: Yes  Meal Prep Responsibility: Primary  Ambulation Assistance: Independent  Transfer Assistance: Independent  Active : No  Patient's  Info: pt nodded head when ask if graddaughter drive pt  Mode of Transportation: Car  Occupation: Retired  Type of occupation: worked on railroad   Leisure & Hobbies: Pt unable to answer question  Additional Comments: Pt unbale to offer PLOF or home set up information during interview. Information provided by grand daughter. Per her report, pt lives alone. She lives next door. States that pt has a cane and [employer], however doesn't like to use either. He is independent at household level from mobility standpoint. [address] daughter states that she bathes him and assists with homemaking responsibilities. Reports that pt has had some residual R side weakness from remote CVA, however at this time, he is significantly weakner than his normal.      Spoke with the patient and his Lylia Cutter about the poor participation in the program this morning. She has also spoke with him to encourage a better participation. I will leave a list of skilled and private duty nursing for her on the desk in the patient's room. The patient said he would like to stay and try harder, and the therapists said he did well with the morning therapy today. We will re-evaluate at [place] [address] team-conference.   Electronically signed by Chandrika Bhatti RN on 17

## 2017-09-19 NOTE — PROGRESS NOTES
if he had pain he said yes and moved his neck to indicate the location of pain. He was repositioned in seat x2 and during second attempt at repositioning, patient was able to scoot back into chair for increased posture. Patient needed moderate assistance to sustain upright sitting posture while engaging in peg board task. Patient tolerated upright sitting balance while placing 2-5 pegs x3 rows of pegs. Moderate cues to place pegs across entire board. Moderate assistance for AROM/PROM using L UE to stabilize RUE for 10 reps x2 for forward backward motion, and moved UEs in circular plane for 10 reps x2 with minimal assistance. Patient presented as fatigued following session. Assessment      Activity Tolerance  Activity Tolerance: Treatment limited secondary to decreased cognition;Patient limited by fatigue;Patient limited by pain;Treatment limited secondary to medical complications (free text)  Activity Tolerance: Patient requires frequent rest breaks and minimal encouragement to participate throughout session. Safety Devices  Safety Devices in place: Yes  Type of devices: All fall risk precautions in place       Discharge Recommendations:  Continue to assess pending progress     Plan   Plan  Times per week: 5-7 x/wk,  minutes   Times per day: Daily  Plan weeks: 3-6 weeks  Current Treatment Recommendations: Strengthening, ROM, Balance Training, Functional Mobility Training, Endurance Training, Neuromuscular Re-education, Self-Care / ADL, Pain Management, Cognitive Reorientation, Cognitive/Perceptual Training, Patient/Caregiver Education & Training, Equipment Evaluation, Education, & procurement, Safety Education & Training    Goals  Patient Goals   Patient goals : Pt nodded when ask if Pt would like to get stronger.      Therapy Time   Individual Concurrent Group Co-treatment   Time In 1500         Time Out 1530         Minutes [address] [place], [place] Electronically signed by Kari Galvez OT on 9/19/2017 at 5:01 PM

## 2017-09-19 NOTE — PROGRESS NOTES
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 The patient is tolerating Jevity bolus. [facility] had been complaining of severe abdominal cramping is doing much better on the bolus feeds. His blood sugars are looking better as well. I change timing of tube feedings to decrease GI distress and increase gastric residuals. I am concerned about his depression, trialing SSRI/Celexa. It seems to be helping    ROS x10: The patient also complains of severely impaired mobility and activities of daily living. Otherwise no new problems with vision, hearing, nose, mouth, throat, dermal, cardiovascular, GI, , pulmonary, musculoskeletal, psychiatric or neurological. See Rehab H&P on Rehab chart dated 09/08/17.      Vital signs:                                     /83  Pulse 87  Temp 98 °F (36.7 °C) (Oral)   Resp 18  Ht 5' 7\" (1.702 m)  Wt 180 lb (81.6 kg)  SpO2 99%  BMI 28.19 kg/m2  I/O:                                                      PO/Intake:   Nothing by mouth due to profound dysphagia. PEG-tube feedings       PEG-tube bolus Glucerna feedings with water flush. Bowel/Bladder:  continent, no problems noted. General:                                         Patient is well developed, adequately nourished, non-obese and                                                                    well kempt. HEENT:                                           Severe thrush PERRLA, hearing intact to loud voice, external inspection of ear                                                                    and nose benign.   Inspection of lips, tongue and gums benign  Musculoskeletal:                 No physical function and facilitate DC at highest level of indep. D/t the severity of pt's presentation, anticipate that pt will require significant assist upon DC. Will reassess as appropriate, however at this time pt expected to DC at [facility] [place] level.      Occupational therapy: FIMS:  Eatin - Staff performs feeding for patient or patient requires IV fluids for hydration/TPN/PPN (npo)  Grooming:  (Activity did not occur)  Bathing:  (Refused. Activity did not occur)  Dressing-Upper:  (Wearing a gown.  Activity did not occur)  Dressing-Lower:  (Activity did not occur)  Toiletin - Total assist  Toilet Transfer: 3 - Requires 25-49% assist getting off toilet  Shower Transfer: 0 - Activity does not occur,  ,       Speech therapy: FIMS: Comprehension: 3 - Patient understands basic needs 50-74% of the time  Expression: 2 - Expresses basic ideas/needs 25-49% of the time  Social Interaction: 3 - Patient appropriate  50%-74% of the time  Problem Solvin - Patient solves simple/routine tasks 25%-49%  Memory: 2 - Patient remembers 25%-49% of the time       Lab/X-ray studies reviewed, analyzed and discussed with patient and staff:               Recent Results          Recent Results (from the past 24 hour(s))   Comprehensive Metabolic Panel     Collection Time: 17 10:00 AM   Result Value Ref Range     Sodium 138 132 - 144 mEq/L     Potassium 3.6 3.5 - 5.1 mEq/L     Chloride 100 98 - 107 mEq/L     CO2 23 22 - 29 mEq/L     Anion Gap 15 (H) 7 - 13 mEq/L     Glucose 147 (H) 74 - 109 mg/dL     BUN 13 8 - 23 mg/dL     CREATININE 0.73 0.70 - 1.20 mg/dL     GFR Non- >60.0 >60     GFR  >60.0 >60     Calcium 8.7 8.6 - 10.2 mg/dL     Total Protein 6.6 6.4 - 8.1 g/dL     Alb 3.4 (L) 3.9 - 4.9 g/dL     Total Bilirubin 0.7 0.0 - 1.2 mg/dL     Alkaline Phosphatase 459 (H) 35 - 104 U/L     ALT 27 0 - 41 U/L     AST 27 0 - 40 U/L     Globulin 3.2 2.3 - 3.5 g/dL   POCT Glucose     Collection Time: Patient and family education was discussed. The patient was made aware of the team discussion regarding their progress. Discharge plans were discussed along with barriers to progress and strategies to address these barriers, patient encouraged to continue to discuss discharge plans with .      Complex Physical Medicine & Rehab Issues Assess & Plan:                1. Severe abnormality of gait and mobility and impaired self-care and ADL's with severe cognitive deficits right hemiparesis aphasia dysphagia secondary to left superior frontal lobe hematoma . Functional and medical status reassessed regarding patients ability to participate in therapies and patient found to be able to participate in acute intensive comprehensive inpatient rehabilitation program including PT/OT to improve balance, ambulation, ADLs, and to improve the P/AROM. Therapeutic modifications regarding activities in therapies, place, amount of time per day and intensity of therapy made daily. In bed therapies or bedside therapies prn.   2. Bowel and Bladder dysfunction incontinence:  frequent toileting, ambulate to bathroom with assistance, check post void residuals. Check for C.difficile x1 if >2 loose stools in 24 hours, continue bowel & bladder program.  Monitor bowel and bladder function. Lactinex 2 PO every AC. MOM prn, Brown Bomb prn, Glycerin suppository prn, enema prn.  3. Severe Postop pain to tenderness in the abdomen  low back pain generalized OA pain: reassess pain every shift and prior to and after each therapy session, give prn Tylenol and  scheduled methadone which she's been on long-term prescription monitoring reviewed doses adjusted because of neurologic injury and advanced age, modalities prn in therapy, Lidoderm, K-pad prn. Add abdominal binder  4.  Skin healing at PEG site and breakdown risk: Betadine to incision 3 times a day cover with abdominal binder  continue pressure relief program.  Daily skin exams Edinnella 42

## 2017-09-20 NOTE — PROGRESS NOTES
SHAY:810   ; Diastolic (57KPI), STV:67, Min:72, Max:72      PULSE OXIMETRY RANGE: SpO2  Av %  Min: 93 %  Max: 93 %    I/O last 3 completed shifts: In: 470 [NG/GT:470]  Out: -     General appearance: No apparent distress, appears stated age and cooperative. HEENT: Pupils equal, round, and reactive to light. Conjunctivae/corneas clear. Neck: Supple, with full range of motion. No jugular venous distention. Trachea midline. Respiratory:  Normal respiratory effort. Clear to auscultation, bilaterally without Rales/Wheezes/Rhonchi. Cardiovascular: Regular rate and rhythm with normal S1/S2 without murmurs, rubs or gallops. Abdomen: Soft, non-tender, non-distended with normal bowel sounds. Musculoskeletal: No clubbing, cyanosis or edema bilaterally. Full range of motion without deformity. Skin: Skin color, texture, turgor normal.  No rashes or lesions.   Neurologic:  grossly non-focal.  Ext : no c/c 1 + edema bilaterally    Labs:     Recent Results (from the past 24 hour(s))   POCT Glucose    Collection Time: 17  6:13 AM   Result Value Ref Range    POC Glucose 170 (H) 60 - 115 mg/dl    Performed on ACCU-CHEK    POCT Glucose    Collection Time: 17  1:01 PM   Result Value Ref Range    POC Glucose 198 (H) 60 - 115 mg/dl    Performed on ACCU-CHEK    POCT Glucose    Collection Time: 17  3:47 PM   Result Value Ref Range    POC Glucose 188 (H) 60 - 115 mg/dl    Performed on ACCU-CHEK            Assessment/Plan:    Active Hospital Problems    Diagnosis Date Noted    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 [Z79.899] 2017    Abnormality of gait and mobility due to TBI Lt superior frontal lobe hematoma, [facility] admit 17 [R26.9]     Aphasia [R47.01]     Atrial fibrillation (HCC) [I48.91]     BPH (benign prostatic hypertrophy) [N40.0]     Chronic back pain [M54.9, G89.29]     Hemiparesis, left (HCC) [G81.94]     Hyperlipidemia [E78.5]     TBI (traumatic brain injury) ([facility] [address]

## 2017-09-20 NOTE — PROGRESS NOTES
Occupational Therapy  Facility/Department: [facility]  Daily Treatment Note  NAME: Sanna Hagan  : 10/28/1929  MRN: [id number]    Date of Service: 2017    Patient Diagnosis(es): There were no encounter diagnoses. has a past medical history of Abnormality of gait and mobility; Aphasia; Atrial fibrillation ([facility] [address] BPH (benign prostatic hypertrophy); Chronic back pain; CVA (cerebrovascular accident) ([facility] [address] Diabetic neuropathy ([facility] [address] Dyslipidemia; Hemiparesis, left ([facility] [address] Hyperlipidemia; Hypertension; Neuropathy ([facility] [address] Osteoarthritis; Pacemaker; TBI (traumatic brain injury) ([facility] [address] Traumatic Lt superior frontal lobe hematoma; Type II or unspecified type diabetes mellitus without mention of complication, uncontrolled; and Vitamin D deficiency. has a past surgical history that includes hernia repair; Cardiac pacemaker placement (Left, ); egd percutaneous placement gastrostomy tube (N/A, 2017); and esophagogastroduodenoscopy transoral diagnostic (N/A, 2017). Restrictions  Restrictions/Precautions  Restrictions/Precautions: Fall Risk, General Precautions  Position Activity Restriction  Other position/activity restrictions: HOB 30degrees, Abd binder   NPO  Subjective   General  Chart Reviewed: Yes  Family / Caregiver Present: No  Referring Practitioner: Dr. Susana Mallory   Diagnosis: TBI- left superior frontal lobe hematoma s/p fall with impaired mobility   Subjective  Subjective: Patient receptive to engage in OT treatment session this morning; Granddaughter present for session  Pre Treatment Pain Screening  Pain at present: 0  Scale Used: Numeric Score  Pain Assessment  Patient Currently in Pain: No  Pain Assessment: 0-10  Odom-Baker Pain Rating: No hurt  Vital Signs  Patient Currently in Pain: No   Orientation     Objective     Pt engaged in graded therapeutic activity to promote strength and endurance. Completed attaching pinch pins to multi level rode and removing with mod difficulty. Card matching with cues for correct placement. PROM to R UE. Pt had difficulty staying awake. Assessment      Activity Tolerance  Activity Tolerance: Patient limited by fatigue  Safety Devices  Safety Devices in place: Yes  Type of devices: All fall risk precautions in place       Discharge Recommendations:  Continue to assess pending progress     Plan   Plan  Times per week: 5-7 x/wk,  minutes   Times per day: Daily  Plan weeks: 3-6 weeks  Current Treatment Recommendations: Strengthening, ROM, Balance Training, Functional Mobility Training, Endurance Training, Neuromuscular Re-education, Self-Care / ADL, Pain Management, Cognitive Reorientation, Cognitive/Perceptual Training, Patient/Caregiver Education & Training, Equipment Evaluation, Education, & procurement, Safety Education & Training  G-Code     OutComes Score                                             Goals  Patient Goals   Patient goals : Pt nodded when ask if Pt would like to get stronger.         Therapy Time   Individual Concurrent Group Co-treatment   Time In 1100         Time Out 1200         Minutes 60                 MANISHA Saba Electronically signed by MANISHA Saba on 9/20/2017 at 12:45 PM

## 2017-09-20 NOTE — PROGRESS NOTES
education:      Therapeutic ex:      WC Mob: 15       Therapy Time:    Individual   Time In 1330   Time Out 1400   Minutes 30       Electronically signed by Phyllis Garner PTA on 9/20/2017 at 5:12 PM

## 2017-09-20 NOTE — PROGRESS NOTES
Physical Therapy  Facility/Department: [facility]  Rehabilitation Daily Treatment Note    NAME: Eddie Santoyo    : 10/28/1929 (80 y.o.)  MRN: [id number]    Account: [de-identified]  Gender: male    Date of Service: 17      SUBJECTIVE:     Start of tx pain 0/10    End of tx pain 0/10      OBJECTIVE:    Bed Mobility:   Not tested      Transfers:   Sit to stand: Maximal Assist  Stand to sit: Moderate Assist  Chair to/from bed: Not tested  Car Transfer: Not tested      Gait/Ambulation:   Assistive Device: [employer]  Assist Level: Maximal Assist +1 WC Follow  Distance: 4 steps  Surface: carpet  Gait Deviations: Decreased Trunk Rotation, Decreased Gait Speed, Decreased Heel Strike (on right), Ataxia, Narrow Base of Support, Decreased Step Length and Crouched Gait. R knee fairly stable initially but quickly becomes more unstable once fatigued. Difficulty advancing AD      Gait/Ambulation:   Assistive Device: HHA,   Assist Level: Maximal Assist +3 (WC Follow & IV Pole management +1, HHA +1, R side neglect with R Knee +1)  Distance: 24 feet  Surface: carpet  Gait Deviations: Decreased Trunk Rotation, Decreased Gait Speed, Decreased Heel Strike (on right), Ataxia, Narrow Base of Support, Decreased Step Length and Crouched Gait. Increased distance tolerance noted when pt not required to manage AD      Curb Step:  NT    Stair Negotiation:  NT    Exercise:  NT      Neuromuscular re-education - the following techniques and tasks were performed in therapy session to facilitate normalized motor control, balance reactions and movement patterns             NDT:  ·  Sit to stand: VCs for increased trunk flexion.  Manual assist with Ant scooting for improved transfer         Balance:  · Dynamic  · Static: pt stood in single support briefly while PTA adjusted gait belt (15-20 sec)  No LOB noted      PLAN:   Continue per POC      Comments:   Pt requires more assistance to stand but once stand is able to walk with single support and max cues for alternating foot placement. Pt stood prior to initial walk (4steps) with single support, and maintained fair+ R Knee stability with decreased assistance to maintain standing. Increased participation today although pt requires intermittent cues for eyes open. LPN present for most of Tx while waiting for tube feed to complete cycle. Falls Safety Armband Present: [x] Yes  [] No    Safety/Judgment:    FAIR-  Improved since past 2 days             Minutes: 28      Transfer/Bed mobility training:      Gait trainin      Neuro re education: 6      Therapeutic ex:        Therapy Time:    Individual   Time In 1002   Time Out 1030   Minutes 28       Electronically signed by Wayne Carr PTA on 2017 at 10:04 AM

## 2017-09-20 NOTE — PROGRESS NOTES
Patient resting comfortably in bed. Bed alarm in on, call light is within reach. Patient denies pain at this time.

## 2017-09-20 NOTE — PROGRESS NOTES
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 He is doing well with nocturnal tube feedings. His mood also seems to be doing better is more interactive with a higher dose of the SSRI    ROS x10: The patient also complains of severely impaired mobility and activities of daily living. Otherwise no new problems with vision, hearing, nose, mouth, throat, dermal, cardiovascular, GI, , pulmonary, musculoskeletal, psychiatric or neurological. See Rehab H&P on Rehab chart dated 09/08/17.      Vital signs:                                     /83  Pulse 87  Temp 98 °F (36.7 °C) (Oral)   Resp 18  Ht 5' 7\" (1.702 m)  Wt 180 lb (81.6 kg)  SpO2 99%  BMI 28.19 kg/m2  I/O:                                                      PO/Intake:   Nothing by mouth due to profound dysphagia. PEG-tube bolus       Glucerna feedings with water flush. Bowel/Bladder:  continent, no problems noted. General:                                         Patient is well developed, adequately nourished, non-obese and                                                                    well kempt. HEENT:                                           Severe thrush PERRLA, hearing intact to loud voice, external inspection of ear                                                                    and nose benign. Inspection of lips, tongue and gums benign  Musculoskeletal:                 No significant change in strength or tone. All joints stable.                                                                      Inspection and palpation of digits and nails show no clubbing, cyanosis or inflammatory conditions. Neuro/Psychiatric:              Affect: flat but pleasant. Alert and oriented to person, place and                                                                    situation. No significant change in deep tendon reflexes or                                                                    sensation  Lungs:                                            Diminished CTA-B. Respiration effort is normal at rest.     Heart:                                                        S1 = S2, irreg No loud murmurs. Abdomen:                                      Soft,  PEG tube-tender, no enlargement of liver or spleen. Extremities:                                   No significant lower extremity edema or tenderness. Right UE infiltrated. Skin:                                                         Intact to general survey, no visualized or palpated problems.     Rehabilitation:  Physical therapy: FIMS:  Bed Mobility:       Transfers: Sit to Stand: Maximum Assistance  Stand to sit: Maximum Assistance  Bed to Chair: Dependent/Total (+2 assist)  Stand Pivot Transfers: Dependent/Total,  ,       FIMS: Bed, Chair, Wheel Chair: 1 - Requires >75% assitance to transfer  Walk: 1- Total Assistance (Subject less than 25%)  Distance Walked: 1ft  Wheel Chair: 1- Total Assistance (Subject less than 25%)  Distance Traveled in Wheel Chair: 10ft  Stairs: 0 - Did not occur,  , Assessment: Pt presenting with above outlined impairments which have negatively impacted his functional mobility status and prevented him from retruning home [facility]. Initiated PT program for balance, mobility and NMR in order to restore physical function and facilitate DC at highest level of indep. D/t the severity of pt's presentation, anticipate that pt will require significant assist upon DC.  Will reassess as appropriate, however at this time pt expected to DC at [facility] level.      Occupational therapy: FIMS:  Eatin - Staff performs feeding for patient or patient requires IV fluids for hydration/TPN/PPN (npo)  Grooming:  (Activity did not occur)  Bathing:  (Refused. Activity did not occur)  Dressing-Upper:  (Wearing a gown.  Activity did not occur)  Dressing-Lower:  (Activity did not occur)  Toiletin - Total assist  Toilet Transfer: 3 - Requires 25-49% assist getting off toilet  Shower Transfer: 0 - Activity does not occur,  ,       Speech therapy: FIMS: Comprehension: 3 - Patient understands basic needs 50-74% of the time  Expression: 2 - Expresses basic ideas/needs 25-49% of the time  Social Interaction: 3 - Patient appropriate  50%-74% of the time  Problem Solvin - Patient solves simple/routine tasks 25%-49%  Memory: 2 - Patient remembers 25%-49% of the time       Lab/X-ray studies reviewed, analyzed and discussed with patient and staff:               Recent Results          Recent Results (from the past 24 hour(s))   Comprehensive Metabolic Panel     Collection Time: 17 10:00 AM   Result Value Ref Range     Sodium 138 132 - 144 mEq/L     Potassium 3.6 3.5 - 5.1 mEq/L     Chloride 100 98 - 107 mEq/L     CO2 23 22 - 29 mEq/L     Anion Gap 15 (H) 7 - 13 mEq/L     Glucose 147 (H) 74 - 109 mg/dL     BUN 13 8 - 23 mg/dL     CREATININE 0.73 0.70 - 1.20 mg/dL     GFR Non- >60.0 >60     GFR  >60.0 >60     Calcium 8.7 8.6 - 10.2 mg/dL     Total Protein 6.6 6.4 - 8.1 g/dL     Alb 3.4 (L) 3.9 - 4.9 g/dL     Total Bilirubin 0.7 0.0 - 1.2 mg/dL     Alkaline Phosphatase 459 (H) 35 - 104 U/L     ALT 27 0 - 41 U/L     AST 27 0 - 40 U/L     Globulin 3.2 2.3 - 3.5 g/dL   POCT Glucose     Collection Time: 17 11:34 AM   Result Value Ref Range     POC Glucose 176 (H) 60 - 115 mg/dl     Performed on ACCU-CHEK     POCT Glucose     Collection Time: 17  4:53 PM   Result Value Ref Range     POC Glucose 141 (H) 60 - 115 mg/dl     Performed on

## 2017-09-20 NOTE — PROGRESS NOTES
Occupational Therapy  Facility/Department: [facility]  Daily Treatment Note  NAME: Chilo Childs  : 10/28/1929  MRN: [id number]    Date of Service: 2017    Patient Diagnosis(es): There were no encounter diagnoses. has a past medical history of Abnormality of gait and mobility; Aphasia; Atrial fibrillation ([address] BPH (benign prostatic hypertrophy); Chronic back pain; CVA (cerebrovascular accident) ([facility] [address] Diabetic neuropathy ([facility] [address] Dyslipidemia; Hemiparesis, left ([address] Hyperlipidemia; Hypertension; Neuropathy ([facility] [address] Osteoarthritis; Pacemaker; TBI (traumatic brain injury) ([facility] [address] Traumatic Lt superior frontal lobe hematoma; Type II or unspecified type diabetes mellitus without mention of complication, uncontrolled; and Vitamin D deficiency. has a past surgical history that includes hernia repair; Cardiac pacemaker placement (Left, ); egd percutaneous placement gastrostomy tube (N/A, 2017); and esophagogastroduodenoscopy transoral diagnostic (N/A, 2017). Restrictions  Restrictions/Precautions  Restrictions/Precautions: Fall Risk, General Precautions  Position Activity Restriction  Other position/activity restrictions: HOB 30degrees, Abd binder   NPO  Subjective   General  Chart Reviewed: Yes  Family / Caregiver Present: No  Referring Practitioner: Dr. Clementina Cowart   Diagnosis: TBI- left superior frontal lobe hematoma s/p fall with impaired mobility   Subjective  Subjective: Patient receptive to engage in OT treatment session this morning; Granddaughter present for session  Pre Treatment Pain Screening  Pain at present: 0  Scale Used: Numeric Score  Pain Assessment  Patient Currently in Pain: No  Pain Assessment: 0-10  Odom-Baker Pain Rating: No hurt  Vital Signs  Patient Currently in Pain: No   Orientation     Objective     Pt instructed to pair nuts and bolts however unable to complete task. PROM to R UE. Pt had max difficulty staying awake.

## 2017-09-21 NOTE — PROGRESS NOTES
Occupational Therapy  Facility/Department: [facility]  Daily Treatment Note  NAME: Yeison Michael  : 10/28/1929  MRN: [id number]    Date of Service: 2017    Patient Diagnosis(es): There were no encounter diagnoses. has a past medical history of Abnormality of gait and mobility; Aphasia; Atrial fibrillation ([address] BPH (benign prostatic hypertrophy); Chronic back pain; CVA (cerebrovascular accident) ([facility] [address] Diabetic neuropathy ([facility] [address] Dyslipidemia; Hemiparesis, left ([address] Hyperlipidemia; Hypertension; Neuropathy ([facility] [address] Osteoarthritis; Pacemaker; TBI (traumatic brain injury) ([facility] [address] Traumatic Lt superior frontal lobe hematoma; Type II or unspecified type diabetes mellitus without mention of complication, uncontrolled; and Vitamin D deficiency. has a past surgical history that includes hernia repair; Cardiac pacemaker placement (Left, ); egd percutaneous placement gastrostomy tube (N/A, 2017); and esophagogastroduodenoscopy transoral diagnostic (N/A, 2017). Restrictions  Restrictions/Precautions  Restrictions/Precautions: Fall Risk, General Precautions  Position Activity Restriction  Other position/activity restrictions: HOB 30degrees, Abd binder   NPO  Subjective   General  Referring Practitioner: Dr. Windy Mortensen   Diagnosis: TBI- left superior frontal lobe hematoma s/p fall with impaired mobility   Pre Treatment Pain Screening  Pain at present: 0  Pain Assessment  Patient Currently in Pain: No     Objective  Pt. participated in AM ADL including shower requiring the amount of assist as noted below.   ADL  Feeding: NPO  Grooming: Stand by assistance;Setup  UE Bathing: Maximum assistance  LE Bathing: Dependent/Total  UE Dressing: Dependent/Total  LE Dressing: Dependent/Total  Toileting: None        Functional Mobility  Functional - Mobility Device: Wheelchair  Activity: To/from bathroom  Assist Level: Dependent/Total  Toilet Transfers  Toilet Transfer: Unable to assess  Tub Transfers  Tub

## 2017-09-21 NOTE — PROGRESS NOTES
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 He is doing well with nocturnal tube feedings. His mood also seems to be doing better is more interactive with a higher dose of the SSRI. BP 93/57, BP medications held. I'm concerned about his poor progress in therapy he is more engaged at least more interactive his mood is better but he is making little effort to get better. ROS x10: The patient also complains of severely impaired mobility and activities of daily living. Otherwise no new problems with vision, hearing, nose, mouth, throat, dermal, cardiovascular, GI, , pulmonary, musculoskeletal, psychiatric or neurological. See Rehab H&P on Rehab chart dated 09/08/17.      Vital signs:                                     BP 93/57  Pulse 87  Temp 98 °F (36.7 °C) (Oral)   Resp 18  Ht 5' 7\" (1.702 m)  Wt 180 lb (81.6 kg)  SpO2 99%  BMI 28.19 kg/m2  I/O:                                                      PO/Intake:   Nothing by mouth due to profound dysphagia. PEG-tube bolus       Glucerna feedings with water flush. Bowel/Bladder:  continent, no problems noted. General:                                         Patient is well developed, adequately nourished, non-obese and                                                                    well kempt. HEENT:                                           Severe thrush PERRLA, hearing intact to loud voice, external inspection of ear                                                                    and nose benign. Inspection of lips, tongue and gums benign  Musculoskeletal:                 No significant change in strength or tone. All joints stable. Inspection and palpation of digits and nails show no clubbing,                                                                      cyanosis or inflammatory conditions. Neuro/Psychiatric:              Affect: flat but pleasant. Alert and oriented to person, place and                                                                    situation. No significant change in deep tendon reflexes or                                                                    sensation  Lungs:                                            Diminished CTA-B. Respiration effort is normal at rest.     Heart:                                                        S1 = S2, irreg No loud murmurs. Abdomen:                                      Soft,  PEG tube-tender, no enlargement of liver or spleen. Extremities:                                   No significant lower extremity edema or tenderness. Right UE infiltrated. Skin:                                                         Intact to general survey, no visualized or palpated problems.     Rehabilitation:  Physical therapy: FIMS:  Bed Mobility:       Transfers: Sit to Stand: Maximum Assistance  Stand to sit: Maximum Assistance  Bed to Chair: Dependent/Total (+2 assist)  Stand Pivot Transfers: Dependent/Total,  ,       FIMS: Bed, Chair, Wheel Chair: 1 - Requires >75% assitance to transfer  Walk: 1- Total Assistance (Subject less than 25%)  Distance Walked: 1ft  Wheel Chair: 1- Total Assistance (Subject less than 25%)  Distance Traveled in Wheel Chair: 10ft  Stairs: 0 - Did not occur,  , Assessment: Pt presenting with above outlined impairments which have negatively impacted his functional mobility status and prevented him from retruning home at [facility].  Initiated PT program for balance, mobility and NMR in order to restore physical function and facilitate DC at patient was made aware of the team discussion regarding their progress.     Complex Physical Medicine & Rehab Issues Assess & Plan:                1. Severe abnormality of gait and mobility and impaired self-care and ADL's with severe cognitive deficits right hemiparesis aphasia dysphagia secondary to left superior frontal lobe hematoma . Functional and medical status reassessed regarding patients ability to participate in therapies and patient found to be able to participate in acute intensive comprehensive inpatient rehabilitation program including PT/OT to improve balance, ambulation, ADLs, and to improve the P/AROM. Therapeutic modifications regarding activities in therapies, place, amount of time per day and intensity of therapy made daily. In bed therapies or bedside therapies prn.   2. Bowel and Bladder dysfunction incontinence:  frequent toileting, ambulate to bathroom with assistance, check post void residuals. Check for C.difficile x1 if >2 loose stools in 24 hours, continue bowel & bladder program.  Monitor bowel and bladder function. Lactinex 2 PO every AC. MOM prn, Brown Bomb prn, Glycerin suppository prn, enema prn.  3. Severe Postop pain to tenderness in the abdomen  low back pain generalized OA pain: reassess pain every shift and prior to and after each therapy session, give prn Tylenol and  scheduled methadone which she's been on long-term prescription monitoring reviewed doses adjusted because of neurologic injury and advanced age, modalities prn in therapy, Lidoderm, K-pad prn. Add abdominal binder  4. Skin healing at PEG site and breakdown risk: Betadine to incision 3 times a day cover with abdominal binder  continue pressure relief program.  Daily skin exams and reports from nursing. 5. Severe Fatigue due to nutritional and hydration deficiency:  continue to monitor I&Os, calorie counts prn, dietary consult prn.   IV fluids until patient able to use the PEG tube he is currently nothing check postvoid residual  7.   Chronic back pain dt   Osteoarthritis- K pad Lidoderm prophylactic Tylenol on a scheduled basis patient is artery on high-dose methadone on a regular basis and may not be tolerating that much longer as he ages we will attempt to wean him to a lower dose if tolerated  8.   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 I have decreased dose of methadone because of severe sedation and advancing age-his pain seems to be a day and he is much more alert and awake. 9.   Moderate episode of recurrent major depressive disorder -Add recreational therapy rehabilitation psychology and support group emotional support, adjust/add medications ( continue SSRI/Celexa increase dosing). 10. Right neglect, high tone -   I plan to slowly introduce Zanaflex.  Monitor liver function test and for over medication-   11. Severe thrush Yeast on tongue - I prescribed cinnamon oil. 12. Acute UTI, UA on 09/08/17 showed moderate leukocyte cece check postvoid residual prescribed Macrobid check urine C AND S.   13. Severe oral pharyngeal dysphagia  patent tolerates Jevity bolus feeds, Update he is now status post PEG tube placement and tolerating bolus feeds. NPO. I will add e-stim if okay with speech pathology  14. Abdominal cramps.   Improving sp PEG feed changes        This note transcribed by Ed Boo on 09/21/17 at 10:46 a.m.        Katie Milton D.O., PM&R                                                                 Attending                                         Manuel Brito

## 2017-09-21 NOTE — PROGRESS NOTES
noted HS shortening, stretches completed; pt c/o pain with mild stretching.  *Pt DC date has been moved up to 9/23/17      Falls Safety Armband Present: [x] Yes  [] No    Safety/Judgment:    POOR             Minutes: 30      Transfer/Bed mobility training:      Gait training:      Neuro re education: 7      Therapeutic ex: 8      WC Mobility: 15       Therapy Time:    Individual   Time In 1300   Time Out 1330   Minutes 30       Electronically signed by Jarrett Prado PTA on 9/21/2017 at 1:33 PM

## 2017-09-22 NOTE — PROGRESS NOTES
Occupational Therapy  Facility/Department: [facility]  Daily Treatment Note  NAME: Collin Eagle  : 10/28/1929  MRN: [id number]    Date of Service: 2017    Patient Diagnosis(es): There were no encounter diagnoses. has a past medical history of Abnormality of gait and mobility; Aphasia; Atrial fibrillation ([address] BPH (benign prostatic hypertrophy); Chronic back pain; CVA (cerebrovascular accident) ([address] Diabetic neuropathy ([facility] [address] Dyslipidemia; Hemiparesis, left ([address] Hyperlipidemia; Hypertension; Neuropathy ([facility] [address] Osteoarthritis; Pacemaker; TBI (traumatic brain injury) ([facility] [address] Traumatic Lt superior frontal lobe hematoma; Type II or unspecified type diabetes mellitus without mention of complication, uncontrolled; and Vitamin D deficiency. has a past surgical history that includes hernia repair; Cardiac pacemaker placement (Left, ); egd percutaneous placement gastrostomy tube (N/A, 2017); and esophagogastroduodenoscopy transoral diagnostic (N/A, 2017). Restrictions  Restrictions/Precautions  Restrictions/Precautions: Fall Risk, General Precautions  Position Activity Restriction  Other position/activity restrictions: HOB 30degrees, Abd binder   NPO  Subjective   General  Referring Practitioner: Dr. Misha Gonzalez   Diagnosis: TBI- left superior frontal lobe hematoma s/p fall with impaired mobility   Pre Treatment Pain Screening  Pain at present: 0  Pain Assessment  Patient Currently in Pain: No  Vital Signs  Patient Currently in Pain: No      Objective  AM D/C ADL completed as noted below. ADL was at bed level per pt. choice. Pt. with increased alert state  ADL  Feeding: NPO  Grooming: Setup;Stand by assistance  UE Bathing: Moderate assistance  LE Bathing: Maximum assistance  UE Dressing: Maximum assistance  LE Dressing: Dependent  Additional Comments: ADL bed level. toileting did not occur depends  was wet , pt was cleaned ,dried and clean depends put on.         Balance  Sitting

## 2017-09-22 NOTE — PROGRESS NOTES
Physical Therapy  Facility/Department: [facility] Daily Treatment Note    NAME: Kate Ventura    : 10/28/1929 (80 y.o.)  MRN: [id number]    Account: [de-identified]  Gender: male    Date of Service: 17      SUBJECTIVE: pt not proving replies nor initiating communication     Start of tx pain 0/10     End of tx pain 0/10       OBJECTIVE:    Bed Mobility:   Attempted but pt refused  (see below re: last time completed)     Bed Mobility: (17)   Rolling: Stand by Assist to R, Mod A to L   Supine to sit: Moderate Assist   Sit to Supine: Contact Guard Assist     *Increased time to complete    Transfers:   Pt refused   (see below re: last time completed)     Chair to/from bed: Max A <> Moderate Assist (slide board)  (17)       *Max A on initial (slight incline to bed), Mod A from bed > WC (decline)      Gait/Ambulation:   Pt refused    Curb Step:  NT Unsafe    Stair Negotiation:  NT  Unsafe      Wheelchair Mobility / Management:     Attempted but pt Refused:  See below re: last time completed (17)     Wheelchair Mobility / Management:  (17)   Distance: 10 feet (tile Surface). Straight path, L, R Turns (Attempted 50 feet but pt less responsive today)   Assist: Maximal Assist    *Patient refusing to use LUE to assist with WC advancement today (unaffected side). Pt was able to use LLE today but required more  assistance to propel WC and maintain straight path. Education/Management: Unable to manage leg rests nor brakes at this time. Wheelchair Mobility / Management:  (17)   Distance: 50 feet (tile Surface). Straight path, L, R Turns. 20 feet retro   Assist: Stand by Assist   *Pt able to advance WC with LLE today. In final 44-55' section pt agreeable to and demonstrated LUE also assisting to propel. Education/Management: able to manage L brake only at this time. *Increased time to complete with max cues to stay on task.       Exercise:  Pt refused      Neuromuscular

## 2017-09-22 NOTE — PROGRESS NOTES
Speech Language Pathology    NAME:  Yeison Michael  ROOM: O841/G615-61  :  10/28/1929  DATE: 2017      Speech Therapy attempted to see Yeison Michael on this date for a/an:    [] Bedside Swallow Evaluation   [] Speech/Language Evaluation   [] Modified Barium Swallow   [x] Treatment    Pt was unable to be seen due to patient:   [] Currently off unit   [x] Refused   [x] Too lethargic to participate    [] NPO for testing   [] On Bipap   [] Nursing Deferred:   [x] Other: Pt's family deferred to allow pt more time to rest         Electronically signed by Rhoda Atkinson SLP on 17 at 1:44 PM

## 2017-09-22 NOTE — PROGRESS NOTES
Physical Therapy  Facility/Department: [facility]  Physical Therapy Missed Treatment     NAME: Chilo Childs    : 10/28/1929 (80 y.o.)  MRN: [id number]    Account: [de-identified]  Gender: male        [x] Patient Declines PT Treatment:  Pt unwilling to participate; pt lying on side in bed upon arrival and would not open eyes again after confirming name. Will attempt PT treatment again at earliest convenience.       Electronically signed by Jazmin Monk PTA on 17 at 5:40 PM

## 2017-09-22 NOTE — CARE COORDINATION
Facility/Department: [facility] CASE MANAGEMENT    NAME: Kate Ventura      : 10/28/1929  MRN: [id number]  [id number]      Social/Functional History  Social/Functional History  Lives With: Alone  Type of Home: House  Home Layout: Two level  Home Access: Stairs to enter with rails  Entrance Stairs - Number of Steps: 3  Bathroom Equipment: Shower chair (Pt nodded head yes when asked if he had a shower chair)  Home Equipment: Cane, Rolling walker  Receives Help From: Family  ADL Assistance: Independent  Homemaking Responsibilities: Yes  Meal Prep Responsibility: Primary  Ambulation Assistance: Independent  Transfer Assistance: Independent  Active : No  Patient's  Info: pt nodded head when ask if graddaughter drive pt  Mode of Transportation: Car  Occupation: Retired  Type of occupation: worked on railroad   Leisure & Hobbies: Pt unable to answer question  Additional Comments: Pt unbale to offer PLOF or home set up information during interview. Information provided by grand daughter. Per her report, pt lives alone. She lives next door. States that pt has a cane and [employer], however doesn't like to use either. He is independent at household level from mobility standpoint. [address] daughter states that she bathes him and assists with homemaking responsibilities. Reports that pt has had some residual R side weakness from remote CVA, however at this time, he is significantly weakner than his normal.     Spoke with the patient and his grandaughters and despite conversations from earlier this week indicating that we will plan discharge if the patient is not able to tolerate our program they are still in need of more time to decide on his next facility. I called therapy and the physician to inform that since I can not get a referral out today. The SNF facilities do not have anyone available over the weekends for referral, so the discharge will be planned for Monday.   Electronically signed by Yudi Adams

## 2017-09-22 NOTE — PROGRESS NOTES
Discharge plans were discussed along with barriers to progress and strategies to address these barriers, patient encouraged to continue to discuss discharge plans with .      Complex Physical Medicine & Rehab Issues Assess & Plan:                1. Severe abnormality of gait and mobility and impaired self-care and ADL's with severe cognitive deficits right hemiparesis aphasia dysphagia secondary to left superior frontal lobe hematoma . Functional and medical status reassessed regarding patients ability to participate in therapies and patient found to be able to participate in acute intensive comprehensive inpatient rehabilitation program including PT/OT to improve balance, ambulation, ADLs, and to improve the P/AROM. Therapeutic modifications regarding activities in therapies, place, amount of time per day and intensity of therapy made daily. In bed therapies or bedside therapies prn.   2. Bowel and Bladder dysfunction incontinence:  frequent toileting, ambulate to bathroom with assistance, check post void residuals. Check for C.difficile x1 if >2 loose stools in 24 hours, continue bowel & bladder program.  Monitor bowel and bladder function. Lactinex 2 PO every AC. MOM prn, Brown Bomb prn, Glycerin suppository prn, enema prn.  3. Severe Postop pain to tenderness in the abdomen  low back pain generalized OA pain: reassess pain every shift and prior to and after each therapy session, give prn Tylenol and  scheduled methadone which she's been on long-term prescription monitoring reviewed doses adjusted because of neurologic injury and advanced age, modalities prn in therapy, Lidoderm, K-pad prn. Add abdominal binder  4. Skin healing at PEG site and breakdown risk: Betadine to incision 3 times a day cover with abdominal binder  continue pressure relief program.  Daily skin exams and reports from nursing.   5. Severe Fatigue due to nutritional and hydration deficiency:  continue to monitor I&Os,  Dose  at at bedtime and check postvoid residual  7.   Chronic back pain dt   Osteoarthritis- K pad Lidoderm prophylactic Tylenol on a scheduled basis patient is artery on high-dose methadone on a regular basis and may not be tolerating that much longer as he ages we will attempt to wean him to a lower dose if tolerated  8.   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 I have decreased dose of methadone because of severe sedation and advancing age-his pain seems to be a day and he is much more alert and awake. 9.   Moderate episode of recurrent major depressive disorder -Add recreational therapy rehabilitation psychology and support group emotional support, adjust/add medications ( continue SSRI/Celexa increase dosing). 10. Right neglect, high tone -   I plan to slowly introduce Zanaflex.  Monitor liver function test and for over medication-   11. Severe thrush Yeast on tongue - I prescribed cinnamon oil. 12. Acute UTI, UA on 09/08/17 showed moderate leukocyte cece check postvoid residual prescribed Macrobid check urine C AND S.   13. Severe oral pharyngeal dysphagia  patent tolerates Jevity bolus feeds, Update he is now status post PEG tube placement and tolerating bolus feeds. NPO. I will add e-stim if okay with speech pathology  14. Abdominal cramps.   Improving sp PEG feed changes        This note transcribed by Linda Johnson on 09/22/17 at 10:41 a.m.        Jordan Peters D.O., PM&R                                                                 Attending                                         Manuel Brito

## 2017-09-23 NOTE — PROGRESS NOTES
pathology  13. Abdominal cramps.   Improving sp PEG feed changes                 Cole Molina D.O., PM&R                                                                 Attending                                         Manuel Brito

## 2017-09-23 NOTE — PROGRESS NOTES
onto bottom two rungs of tree stand with multiple rest breaks provided and minimal encouragement to participate in activity. UE Strengthing: Patient engages in multiple therapeutic activities for increased hand strengthening. Patient places ~15 clothespins onto vertical pole with moderate cues for proper orientation of clothespins in order to pinch and place onto target. Patient requires multiple rest breaks to complete activity. Patient completes green theraputty exercises, performing 5 squeezes with right hand, tolerating well. Assessment    Patient tolerates session well this date with minimal encouragement required for participation. Frequent rest breaks provided throughout session. Activity Tolerance  Activity Tolerance: Patient Tolerated treatment well  Activity Tolerance: Patient requires frequent rest breaks and minimal encouragement to participate throughout session. Safety Devices  Safety Devices in place: Yes  Type of devices: All fall risk precautions in place       Discharge Recommendations:  Continue to assess pending progress     Plan   Plan  Times per week: 5-7 x/wk,  minutes   Times per day: Daily  Plan weeks: 3-6 weeks  Current Treatment Recommendations: Strengthening, ROM, Balance Training, Functional Mobility Training, Endurance Training, Neuromuscular Re-education, Self-Care / ADL, Pain Management, Cognitive Reorientation, Cognitive/Perceptual Training, Patient/Caregiver Education & Training, Equipment Evaluation, Education, & procurement, Safety Education & Training    Goals  Patient Goals   Patient goals : Pt nodded when ask if Pt would like to get stronger.         Therapy Time   Individual Concurrent Group Co-treatment   Time In 1100         Time Out 1130         Minutes 30               Electronically signed by ELIZ Collins/L on 9/23/2017 at 11:40 AM  ELIZ Collins/MAYUR

## 2017-09-24 NOTE — PROGRESS NOTES
24 hours, continue bowel & bladder program.  Monitor bowel and bladder function. Lactinex 2 PO every AC. MOM prn, Brown Bomb prn, Glycerin suppository prn, enema prn.  3. Severe Postop pain to tenderness in the abdomen  low back pain generalized OA pain: reassess pain every shift and prior to and after each therapy session, give prn Tylenol and  scheduled methadone which she's been on long-term prescription monitoring reviewed doses adjusted because of neurologic injury and advanced age, modalities prn in therapy, Lidoderm, K-pad prn. Add abdominal binder  4. Skin healing at PEG site and breakdown risk: Betadine to incision 3 times a day cover with abdominal binder  continue pressure relief program.  Daily skin exams and reports from nursing. 5. Severe Fatigue due to nutritional and hydration deficiency:  continue to monitor I&Os,  dietary consult  . PEG tube feeds and water flushes  6. Acute episodic insomnia with situational adjustment disorder:  prn Ambien, monitor for day time sedation. 7. Falls risk elevated:  patient to use call light to get nursing assistance to get up, bed and chair alarm. 8. Elevated DVT risk: progressive activities in PT, continue prophylaxis IHSAN hose, elevation and  no blood thinners because of recent bleed in the brain . 9. Complex discharge planning: discharge date is set for 09/25/17 to  SNF. The patient is to follow-up with their family physician after discharge.         Complex Active General Medical Issues that complicate care Assess & Plan:                        1.   Intraparenchymal hemorrhage of brain Lobar cerebral hemorrhage   TBI (traumatic brain injury),  Traumatic Lt superior frontal lobe hematoma-with high tone issues - consult Dr. Taylor Aguilar. [facility] thinners until seen by Dr. Laura Corcoran and limited other toxic sedating medications  2.   Hypertension, atrial fibrillation, hyperlipidemia - continue blood pressure checks, adjust/add medications (Lipitor, Attending                                         Manuel Brito

## 2017-09-24 NOTE — PLAN OF CARE
Problem: Falls - Risk of  Goal: Absence of falls  Outcome: Ongoing    Problem: Risk for Impaired Skin Integrity  Goal: Tissue integrity - skin and mucous membranes  Structural intactness and normal physiological function of skin and  mucous membranes.    Outcome: Ongoing    Problem: Nutrition  Goal: Optimal nutrition therapy  Outcome: Ongoing    Problem: IP COMMUNICATION/DYSARTHRIA  Goal: LTG - patient will improve expressive language skills to allow for communication of wants and needs in daily activities  Outcome: Ongoing    Problem: IP SWALLOWING  Goal: LTG - patient will tolerate the least restrictive diet consistency to allow for safe consumption of daily meals  Outcome: Ongoing    Problem: Pain:  Goal: Pain level will decrease  Pain level will decrease   Outcome: Ongoing  Goal: Control of acute pain  Control of acute pain   Outcome: Ongoing  Goal: Control of chronic pain  Control of chronic pain   Outcome: Ongoing

## 2017-09-24 NOTE — PROGRESS NOTES
Fax sent to [facility] for scd's.  Electronically signed by Richelle Monroy RN on 9/24/2017 at 3:25 AM

## 2017-09-25 NOTE — PROGRESS NOTES
Will attempt again when able. Electronically signed by MANISHA Ty on 9/25/2017 at 11:30 PM        Assessment      Safety Devices  Safety Devices in place: Yes  Type of devices: All fall risk precautions in place       Discharge Recommendations:  Continue to assess pending progress     Plan   Plan  Times per week: 5-7 x/wk,  minutes   Times per day: Daily  Plan weeks: 3-6 weeks  Current Treatment Recommendations: Strengthening, ROM, Balance Training, Functional Mobility Training, Endurance Training, Neuromuscular Re-education, Self-Care / ADL, Pain Management, Cognitive Reorientation, Cognitive/Perceptual Training, Patient/Caregiver Education & Training, Equipment Evaluation, Education, & procurement, Safety Education & Training  G-Code     OutComes Score                                             Goals  Patient Goals   Patient goals : Pt nodded when ask if Pt would like to get stronger.         Therapy Time   Individual Concurrent Group Co-treatment   Time In          Time Out          Minutes             Variance: 30  Reason: Refusal (pt would not open eyes when spoken to)  Electronically signed by MANISHA Ty on 9/25/2017 at 3:55 PM  MANISHA Ty

## 2017-09-25 NOTE — PROGRESS NOTES
Tolerance  Activity Tolerance: Patient Tolerated treatment well  Activity Tolerance: Patient requires frequent rest breaks and minimal encouragement to participate throughout session. Safety Devices  Safety Devices in place: Yes  Type of devices: All fall risk precautions in place       Discharge Recommendations:  Continue to assess pending progress     Plan   Plan  Times per week: 5-7 x/wk,  minutes   Times per day: Daily  Plan weeks: 3-6 weeks  Current Treatment Recommendations: Strengthening, ROM, Balance Training, Functional Mobility Training, Endurance Training, Neuromuscular Re-education, Self-Care / ADL, Pain Management, Cognitive Reorientation, Cognitive/Perceptual Training, Patient/Caregiver Education & Training, Equipment Evaluation, Education, & procurement, Safety Education & Training  G-Code     OutComes Score                                             Goals  Patient Goals   Patient goals : Pt nodded when ask if Pt would like to get stronger.         Therapy Time   Individual Concurrent Group Co-treatment   Time In 1300         Time Out 1325         Minutes 25               Electronically signed by Pilar DYER on 9/25/2017 at 2:22 PM  MANISHA Perkins

## 2017-09-25 NOTE — DISCHARGE INSTR - DIET
 Good nutrition is important when healing from an illness, injury, or surgery. Follow any nutrition recommendations given to you during your hospital stay.  If you were given an oral nutrition supplement while in the hospital, continue to take this supplement at home. You can take it with meals, in-between meals, and/or before bedtime. These supplements can be purchased at most local grocery stores, pharmacies, and chain super-stores.  If you have any questions about your diet or nutrition, call the hospital and ask for the dietitian. NPO  240ml Glucerna 1.2 Tubefeed  TID  Continous TF 7800-6046  Flush 100ml water 6 times daily.

## 2017-09-25 NOTE — DISCHARGE SUMMARY
Patient is well developed, adequately nourished, non-obese and                                                                    well kempt. HEENT:                                           Severe thrush PERRLA, hearing intact to loud voice, external inspection of ear                                                                    and nose benign. Inspection of lips, tongue and gums benign  Musculoskeletal:                 No significant change in strength or tone. All joints stable. Inspection and palpation of digits and nails show no clubbing,                                                                      cyanosis or inflammatory conditions. Neuro/Psychiatric:              Affect: flat but pleasant. Alert and oriented to person, place and                                                                    situation. No significant change in deep tendon reflexes or                                                                    sensation  Lungs:                                            Diminished CTA-B. Respiration effort is normal at rest.     Heart:                                                        S1 = S2, irreg No loud murmurs. Abdomen:                                      Soft,  PEG tube-tender, no enlargement of liver or spleen. Extremities:                                   No significant lower extremity edema or tenderness. Right UE infiltrated.   Skin:                                                         Intact to general survey, no visualized or palpated problems.     Rehabilitation:  Physical therapy: FIMS:  Bed Mobility:       Transfers: Sit to Stand: Maximum Assistance  Stand to sit: Maximum Assistance  Bed to Chair: Dependent/Total (+2 assist)  Stand Pivot Transfers: Dependent/Total,  ,    FIMS: Bed, Chair, Wheel Chair: 1 - Requires >75% assitance to transfer  Walk: 1- Total Assistance (Subject less than 25%)  Distance Walked: 1ft  Wheel Chair: 1- Total Assistance (Subject less than 25%)  Distance Traveled in Wheel Chair: 10ft  Stairs: 0 - Did not occur,  , Assessment: Pt presenting with above outlined impairments which have negatively impacted his functional mobility status and prevented him from retruning home at [facility]. Initiated PT program for balance, mobility and NMR in order to restore physical function and facilitate DC at [facility] level of [facility]. D/t the severity of pt's presentation, anticipate that pt will require significant assist upon DC. Will reassess as appropriate, however at this time pt expected to DC at [facility] [place]note 2[place] level.      Occupational therapy: FIMS:  Eatin - Staff performs feeding for patient or patient requires IV fluids for hydration/TPN/PPN (npo)  Grooming:  (Activity did not occur)  Bathing:  (Refused. Activity did not occur)  Dressing-Upper:  (Wearing a gown.  Activity did not occur)  Dressing-Lower:  (Activity did not occur)  Toiletin - Total assist  Toilet Transfer: 3 - Requires 25-49% assist getting off toilet  Shower Transfer: 0 - Activity does not occur,  ,       Speech therapy: FIMS: Comprehension: 3 - Patient understands basic needs 50-74% of the time  Expression: 2 - Expresses basic ideas/needs 25-49% of the time  Social Interaction: 3 - Patient appropriate  50%-74% of the time  Problem Solvin - Patient solves simple/routine tasks 25%-49%  Memory: 2 - Patient remembers 25%-49% of the time       Lab/X-ray studies reviewed, analyzed and discussed with patient and staff:               Recent Results          Recent Results (from the past 24 hour(s))   Comprehensive Metabolic Panel     Collection Time: 17 10:00 AM   Result Value Ref Range     Sodium 138 132 - 144 mEq/L     Potassium 3.6 3.5 - 5.1 mEq/L     Chloride 100 98 - 107 mEq/L     CO2 23 22 - 29 mEq/L     Anion Gap 15 (H) 7 - 13 mEq/L     Glucose 147 (H) 74 - 109 mg/dL     BUN 13 8 - 23 mg/dL     CREATININE 0.73 0.70 - 1.20 mg/dL     GFR Non- >60.0 >60     GFR  >60.0 >60     Calcium 8.7 8.6 - 10.2 mg/dL     Total Protein 6.6 6.4 - 8.1 g/dL     Alb 3.4 (L) 3.9 - 4.9 g/dL     Total Bilirubin 0.7 0.0 - 1.2 mg/dL     Alkaline Phosphatase 459 (H) 35 - 104 U/L     ALT 27 0 - 41 U/L     AST 27 0 - 40 U/L     Globulin 3.2 2.3 - 3.5 g/dL   POCT Glucose     Collection Time: 09/11/17 11:34 AM   Result Value Ref Range     POC Glucose 176 (H) 60 - 115 mg/dl     Performed on ACCU-CHEK     POCT Glucose     Collection Time: 09/11/17  4:53 PM   Result Value Ref Range     POC Glucose 141 (H) 60 - 115 mg/dl     Performed on ACCU-CHEK     POCT Glucose     Collection Time: 09/11/17  8:42 PM   Result Value Ref Range     POC Glucose 132 (H) 60 - 115 mg/dl     Performed on ACCU-CHEK     POCT Glucose     Collection Time: 09/12/17  6:04 AM   Result Value Ref Range     POC Glucose 117 (H) 60 - 115 mg/dl     Performed on ACCU-CHEK              Ct Cervical Spine Wo Contrast  Result Date: 9/3/2017   3.2 CM INTRA-AXIAL ACUTE HEMATOMA IN THE LEFT PARIETAL LOBE, WITHIN A REGION OF ENCEPHALOMALACIA THAT REPRESENTS A REMOTE INFARCT. THERE ARE ADVANCED AGE-RELATED FINDINGS. THERE IS DEGENERATIVE DISEASE IN THE CERVICAL SPINE, WITHOUT EVIDENCE  OF FRACTURE OR TRAUMATIC SUBLUXATION.  All CT scans at this facility use dose modulation, iterative reconstruction, and/or weight based dosing when appropriate to reduce radiation dose to as low as reasonably achievable.         Previous extensive, complex labs, notes and diagnostics reviewed and analyzed.      ALLERGIES:        Allergies as of 09/07/2017    (No Known Allergies)      (please also verify by checking MAR)         Complex Physical Medicine & Rehab Issues Assess & Plan:                1. Severe abnormality of gait and mobility and impaired self-care and ADL's his tone is not interfering with therapy will continue range of motion and heat to prevent contracture and improve function  10. Severe thrush Yeast on tongue - 4 times a day cinnamon oil. 11. Acute UTI, UA on 09/08/17 showed moderate leukocyte cece - prescribed Macrobid check urine C AND S.   12. Severe oral pharyngeal dysphagia  - patent tolerates Jevity bolus feeds, Update he is now status post PEG tube placement and tolerating bolus feeds. NPO.     I will add e-stim if okay with speech pathology           This note transcribed by Sherry De La Rosa on 09/25/17 at 9:46 a.m.          Isrrael Chappell D.O., PM&R                                                                 Attending                                         Manuel Brito

## 2017-09-25 NOTE — CARE COORDINATION
Facility/Department: [facility] CASE MANAGEMENT    NAME: Sisi Astorga      : 10/28/1929  MRN: [id number]  [id number]      Social/Functional History  Social/Functional History  Lives With: Alone  Type of Home: House  Home Layout: Two level  Home Access: Stairs to enter with rails  Entrance Stairs - Number of Steps: 3  Bathroom Equipment: Shower chair (Pt nodded head yes when asked if he had a shower chair)  Home Equipment: Cane, Rolling walker  Receives Help From: Family  ADL Assistance: Independent  Homemaking Responsibilities: Yes  Meal Prep Responsibility: Primary  Ambulation Assistance: Independent  Transfer Assistance: Independent  Active : No  Patient's  Info: pt nodded head when ask if graddaughter drive pt  Mode of Transportation: Car  Occupation: Retired  Type of occupation: worked on [employer]   Leisure & Hobbies: Pt unable to answer question  Additional Comments: Pt unbale to offer PLOF or home set up information during interview. Information provided by grand daughter. Per her report, pt lives alone. She lives next door. States that pt has a cane and [employer], however doesn't like to use either. He is independent at household level from mobility standpoint. [address] daughter states that she bathes him and assists with homemaking responsibilities. Reports that pt has had some residual R side weakness from remote CVA, however at this time, he is significantly weakner than his normal.     Spoke with the patient and his grandaughter and they have decided that they would love to go to [address] [place] [address] as fiirst choice called the referral today and would like to discharge patient today if they will accept.   Electronically signed by Carolyn Garcia RN on 17 at 8:22 AM

## 2017-09-26 NOTE — PROGRESS NOTES
Occupational Therapy    Facility/Department: [facility]  Daily Treatment Note  NAME: Jose J Ruiz  : 10/28/1929  MRN: [id number]    Date of Service: 2017    Patient Diagnosis(es): There were no encounter diagnoses. has a past medical history of Abnormality of gait and mobility; Aphasia; Atrial fibrillation ([address] BPH (benign prostatic hypertrophy); Chronic back pain; CVA (cerebrovascular accident) ([address] Diabetic neuropathy ([address] Dyslipidemia; Hemiparesis, left ([address] Hyperlipidemia; Hypertension; Neuropathy ([address] Osteoarthritis; Pacemaker; TBI (traumatic brain injury) ([facility] [address] Traumatic Lt superior frontal lobe hematoma; Type II or unspecified type diabetes mellitus without mention of complication, uncontrolled; and Vitamin D deficiency. has a past surgical history that includes hernia repair; Cardiac pacemaker placement (Left, ); egd percutaneous placement gastrostomy tube (N/A, 2017); and esophagogastroduodenoscopy transoral diagnostic (N/A, 2017). Restrictions  Restrictions/Precautions  Restrictions/Precautions: Fall Risk, General Precautions  Position Activity Restriction  Other position/activity restrictions: HOB 30degrees, Abd binder   NPO  Subjective   General  Chart Reviewed: Yes  Family / Caregiver Present: No  Referring Practitioner: Dr. Linda Masters   Diagnosis: TBI- left superior frontal lobe hematoma s/p fall with impaired mobility   Subjective  Subjective: Patient's granddaughter present for beginning of session      Orientation Imp    Pt. D/C status is as follows and does fluctuate dependent on pt's mood/level of participation.     Feeding:  NPO  Grooming:  Set Up/SBA  Bathing:  Maximal Assist  UB Dressing:  Dependent  LB Dressing:  Dependent  Toileting:  Unable to assess      Balance  Sitting Balance: Stand by assistance  Standing Balance  Sit to stand: Maximum assistance  Stand to sit: Moderate assistance  Comment: all transfers and standing variable dependent on

## 2017-09-26 NOTE — PROGRESS NOTES
EGD PERCUTANEOUS PLACEMENT GASTROSTOMY TUBE N/A 9/12/2017    EGD ESOPHAGOGASTRODUODENOSCOPY PEG TUBE INSERTION performed by Bong Diop MD at [address] ESOPHAGOGASTRODUODENOSCOPY TRANSORAL DIAGNOSTIC N/A 9/12/2017    EGD ESOPHAGOGASTRODUODENOSCOPY performed by Daxa Earl DO at [facility]       Indications for Skilled Intervention: Decreased functional mobility , Decreased safe awareness, Decreased balance, Decreased coordination, Decreased cognition, Decreased ROM, Decreased strength, Decreased endurance, Decreased vision/visual deficit, Decreased fine motor control    GOALS:  Short term goals  Short term goal 1: Pt to complete HEP with Olga   Short term goal 2: Pt to complete (6\") reaching tasks in unsupported sitting EOB without LOB X 3min  Short term goal 3: Pt to maintain static standing with LRD and Olga X 1min  Long term goals  Long term goal 1: Pt to complete bed mobility with CGA  Long term goal 2: Pt to complete transfers with Olga  Long term goal 3: Pt to ambulate 10ft with LRD and ModA  Long term goal 4: Pt to manage and propel WC on level surfaces, in protected environments with ModA    *unable to formally test above goals d/t pt's decreased participation in therapy program    Summary of POC: Throughout rehab stay, pt received skilled physical therapy treatment 1.0 hour daily for 2 weeks.     Skilled Services Provided: Strengthening, Transfer Training, Endurance Training, Neuromuscular Re-education, Cognitive Reorientation, Patient/Caregiver Education & Training, Equipment Evaluation, Education, & procurement, Wheelchair Mobility Training, ROM, Balance Training, Functional Mobility Training, Stair training, Gait Training, Home Exercise Program, Safety Education & Training, Cognitive/Perceptual Training (Please refer to daily notes for all treatment details)    ASSESSMENT: Pt demonstrated decreased attention, and required constant cueing for motivation and active participation throughout POC, at times becoming agitated with therapy staff. Pt unable to demonstrate meaningful functional gains nor tolerate full therapy load. Therefore, care team plan to DC pt to SNF for further therapy at lower intensity and long term discharge planning. Discharge Plan: DC to SNF for further therapy and DC planning needs.      Electronically signed by Jose E Roberts PT on 9/26/2017 at 1:49 PM

## 2017-09-28 NOTE — DISCHARGE SUMMARY
[address] Course: The patient was admitted to the Rehabilitation Unit to address ADL and mobility deficits. The patient was enrolled in acute PT, OT program.  Weekly team meetings were held to assess functional progress toward their goals. The patient's medical issues were addressed. The patient was sent for PEG-tube placement today and then returned to the Rehab Unit. Refer to FIM scores summary report for detailed functional status. Greater than 25 minutes was spent on coordinating patients discharge including follow-up care, medications and patient/family education. ROS x10:  The patient also complains of severely impaired mobility and activities of daily living. Donita Maldonado no new problems with vision, hearing, nose, mouth, throat, dermal, cardiovascular, GI, , pulmonary, musculoskeletal, psychiatric or neurological. See Rehab H&P on Rehab chart dated .      Vital signs:                                     /83  Pulse 87  Temp 98 °F (36.7 °C) (Oral)   Resp 18  Ht 5' 7\" (1.702 m)  Wt 180 lb (81.6 kg)  SpO2 99%  BMI 28.19 kg/m2  I/O:                                                            PO/Intake:   Nothing by mouth due to profound dysphagia                                              Bowel/Bladder:  continent, no problems noted. General:                                         Patient is well developed, adequately nourished, non-obese and                                                                    well kempt.                                        HEENT:                                           Severe thrush PERRLA, hearing intact to loud voice, external inspection of ear                                                                    and nose benign.  Inspection of lips, tongue and gums benign  Musculoskeletal:                 No significant change in strength or tone.  All joints stable. 176 (H) 60 - 115 mg/dl      Performed on ACCU-CHEK      POCT Glucose      Collection Time: 09/11/17  4:53 PM   Result Value Ref Range      POC Glucose 141 (H) 60 - 115 mg/dl      Performed on ACCU-CHEK      POCT Glucose      Collection Time: 09/11/17  8:42 PM   Result Value Ref Range      POC Glucose 132 (H) 60 - 115 mg/dl      Performed on ACCU-CHEK      POCT Glucose      Collection Time: 09/12/17  6:04 AM   Result Value Ref Range      POC Glucose 117 (H) 60 - 115 mg/dl      Performed on ACCU-CHEK                  Ct Cervical Spine Wo Contrast  Result Date: 9/3/2017   3.2 CM INTRA-AXIAL ACUTE HEMATOMA IN THE LEFT PARIETAL LOBE, WITHIN A REGION OF ENCEPHALOMALACIA THAT REPRESENTS A REMOTE INFARCT. THERE ARE ADVANCED AGE-RELATED FINDINGS. THERE IS DEGENERATIVE DISEASE IN THE CERVICAL SPINE, WITHOUT EVIDENCE  OF FRACTURE OR TRAUMATIC SUBLUXATION. All CT scans at this facility use dose modulation, iterative reconstruction, and/or weight based dosing when appropriate to reduce radiation dose to as low as reasonably achievable.              Xr Chest Portable   Result Date: 9/3/2017   CONCLUSION: NO ACUTE PROCESS          Previous extensive, complex labs, notes and diagnostics reviewed and analyzed.       ALLERGIES:          Allergies as of 09/07/2017    (No Known Allergies)      (please also verify by checking MAR)            Complex Physical Medicine & Rehab Issues Assess & Plan:                1.  Severe abnormality of gait and mobility and impaired self-care and ADL's secondary to Severe cognitive deficits right hemiparesis aphasia dysphagia secondary to left superior frontal lobe hematoma .  The patient went for PEG-tube placement today. [facility] then returned to the Rehab unit.      Complex Active General Medical Issues that complicate care Assess & Plan:                       1.   Intraparenchymal hemorrhage of brain Lobar cerebral hemorrhage   TBI (traumatic brain injury),  Traumatic Lt superior frontal lobe hematoma-with high tone issues - consult Dr. Rivera Martel. [facility] thinners until seen by Dr. Karlso Miller and limited other toxic sedating medications  2.   Hypertension, atrial fibrillation, hyperlipidemia - continue blood pressure checks, adjust/add medications (Lipitor, Lisinopril, Lopressor). 3.   Type 2 diabetes mellitus   with  Diabetic neuropathy - Continue blood sugar checks, 1500-calorie ADA diet, adjust/add medications hold Glucophage until patient by mouth  His blood sugars looked good 111-92-patient may not need Glucophage as his blood sugars have been at perfect consistent here  5.   History of CVA (cerebrovascular accident), with residual Hemiparesis, left -coordinate advanced nuanced rehabilitation with  [facility] accredited acute rehabilitation team  6.   Severe cognitive deficits as well as Aphasia - speech therapy consult. 7.   BPH (benign prostatic hypertrophy) - Flomax.  Dose  at at bedtime and check postvoid residual  8.   Chronic back pain dt   Osteoarthritis- K pad Lidoderm prophylactic Tylenol on a scheduled basis patient is artery on high-dose methadone on a regular basis and may not be tolerating that much longer as he ages we will attempt to wean him to a lower dose if tolerated  9.   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  I have decreased dose of methadone because of severe sedation and advancing age  8.   Moderate episode of recurrent major depressive disorder - emotional support, adjust/add medications (Celexa). 11. Right neglect, high tone -   I plan to slowly introduce Zanaflex.  Monitor liver function test and for over medication-I will hold Zanaflex until he is able to give his PEG tube in  12. Severe thrush Yeast on tongue - I prescribed cinnamon oil. 13. Acute UTI, UA on 09/08/17 showed moderate leukocyte cece check postvoid residual prescribed Macrobid check urine C AND S  14.  Severe oral pharyngeal

## 2017-09-29 NOTE — PROGRESS NOTES
somewhat confused, but denied chest pain, palpitations, nausea, vomiting, emesis. Rest of 14-point review of systems is unobtainable. PHYSICAL EXAMINATION: The patient's vital signs are stable. He is afebrile at 96.1, pulse 82, blood pressure 113/60, respirations 14. He is alert and oriented times at least 1 to 2, chronically ill in appearance. Pupils are small, but they are reactive. Oral mucosa moist. No thrush. Neck was supple. Chest showed coarse breath sounds. No crackles, no wheezing. Cardiovascular exam showed a regular rate. Abdomen is soft, nontender. No involuntary guarding or rigidity. PEG tube is in place. Stoma site is intact. Extremities showed trace dorsal pedal pulse. SKIN: No evidence of rash. LYMPH: No axillary or inguinal lymphadenopathy. ASSESSMENT AND PLAN:   1. Status post fall with hematoma. The patient will undergo course with stroke rehabilitation. 2.  Dysphagia. The patient is status post PEG placement, clinically stable. Monitor input. 3.  Hypertension. Blood pressure is stable. No orthostasis. 4.  Diabetes. We will continue on his insulin therapy, oral agents. Repeat A1c, lipid panel. 5.  Weakness. The patient will undergo a course of physical therapy, occupational therapy, skin surveillance, nutritional support with the goal of maximization of his functional status. Please note, hospital records, imaging reports, consultant notes, laboratory results reviewed at this time.           Electronically Signed By: Renetta Milton M.D. on 09/28/2017 01:40:11  ______________________________  Renetta Milton M.D.  [id number]  D: 09/26/2017 17:12:55  T: 09/27/2017 15:43:15

## 2017-10-03 NOTE — PROGRESS NOTES
[address] [id number] [facility]  [phone] Fax    [address], 80 y.o. male presents today with:  Chief Complaint   Patient presents with    Medication Adjustment     HPI  Patient is seen today for multiple medication issues. He has dysphasia, with history of stroke, and has a PEG tube. He is taking nothing by mouth. He has multiple medications that cannot be given through the PEG tube,  including Flomax for BPH, Protonix for GERD, and fish oil. He is on Pepcid and Tums, is asymptomatic, so we will stop the Protonix. We'll switch the Flomax to doxazosin. He is receiving therapies for generalized weakness, however is not making much progress per nursing. He is transferring with the assistance of one person. He is diabetic, on metformin, and last A1c was less than goal. Blood sugars are running consistently less than 150. We'll stop the sliding scale coverage. Code status is to be DNR CC; form signed by daughter/POA. Review of Systems   Constitutional: Positive for fatigue. Negative for fever. Respiratory: Negative for cough, shortness of breath and wheezing. Cardiovascular: Negative for chest pain and leg swelling. Gastrointestinal: Negative for abdominal pain and constipation. Musculoskeletal: Positive for arthralgias and gait problem. Chronic pain, on methadone   Psychiatric/Behavioral: Positive for confusion. The patient is nervous/anxious.       No Known Allergies     Past Medical History:   Diagnosis Date    Abnormality of gait and mobility     Aphasia     Atrial fibrillation (HCC)     BPH (benign prostatic hypertrophy)     Chronic back pain     CVA (cerebrovascular accident) ([address]     Diabetic neuropathy ([address]     Dyslipidemia     Hemiparesis, left ([address]     Hyperlipidemia     Hypertension     Neuropathy ([address]     Osteoarthritis     neck, back    Pacemaker     TBI (traumatic brain injury) ([address]     Traumatic Lt superior frontal lobe hematoma     Type II or unspecified type diabetes mellitus without mention of complication, uncontrolled     Vitamin D deficiency      Past Surgical History:   Procedure Laterality Date    CARDIAC PACEMAKER PLACEMENT Left 2015    HERNIA REPAIR      [place] EGD PERCUTANEOUS PLACEMENT GASTROSTOMY TUBE N/A 9/12/2017    EGD ESOPHAGOGASTRODUODENOSCOPY PEG TUBE INSERTION performed by Edi Orr MD at [address] [facility] ESOPHAGOGASTRODUODENOSCOPY TRANSORAL DIAGNOSTIC N/A 9/12/2017    EGD ESOPHAGOGASTRODUODENOSCOPY performed by Catie Love DO at [facility]     Medications reviewed at facility. Objective  Vitals:    10/03/17 0907   BP: 103/60   Pulse: 63   Resp: 16   Temp: 98.2 °F (36.8 °C)   SpO2: 100%   Weight: 163 lb (73.9 kg)   Height: 5' 8\" (1.727 m)     Physical Exam   Constitutional: He appears unhealthy. No distress. Cardiovascular: Normal rate, regular rhythm and normal heart sounds. No murmur heard. No lower extremity edema   Pulmonary/Chest: Effort normal and breath sounds normal. He has no wheezes. Abdominal: Soft. Bowel sounds are normal. There is no tenderness. PEG tube   Musculoskeletal: Normal range of motion. He exhibits no edema or tenderness. General weakness   Psychiatric: His mood appears anxious. He exhibits abnormal recent memory. He has a flat affect. Vitals reviewed. Assessment & Plan   1. Dysphasia      NPO, on tube feeding. D/C fish oil, protonix, flomax. Switch iron to liquid. Labs in the AM   2. Generalized weakness      Monitoring; continue PT/OT   3. Benign prostatic hyperplasia, presence of lower urinary tract symptoms unspecified, unspecified morphology  doxazosin (CARDURA) 1 MG tablet    Stop Flomax due to NPO with PEG. Begin Doxazosin. 4. DM type 2, controlled, with complication ([address]      Controlled, on metformin. D/C SS coverage and routine accuchecks.    5. Gastroesophageal reflux disease, esophagitis presence not specified Stop Protonix, continue Pepcid. Orders Placed This Encounter   Procedures    DNR comfort care     Orders Placed This Encounter   Medications    doxazosin (CARDURA) 1 MG tablet     Sig: Take 1 tablet by mouth nightly     Dispense:  30 tablet     Refill:  3     Medications Discontinued During This Encounter   Medication Reason    Omega-3 Fatty Acids (FISH OIL) 1200 MG CAPS     insulin lispro (HUMALOG) 100 UNIT/ML injection vial Therapy completed    pantoprazole (PROTONIX) 40 MG tablet     tamsulosin (FLOMAX) 0.4 MG capsule Alternate therapy     Medications reviewed and updated  Continue therapies, and continue to stress fall prevention strategies. Return for follow up as needed while in skilled rehab.     Urbano Block, LAURA

## 2017-10-16 NOTE — PROGRESS NOTES
therapies, and continue to stress fall prevention strategies. Return for follow up as needed while in skilled rehab.     Chris Hernandez, CNP

## 2017-10-19 NOTE — PROGRESS NOTES
Subsequent Progress Note  Patient: Mikell Phoenix  YOB: 1929  MRN: [id number]    Chief Complaint:  Chief Complaint   Patient presents with    Follow-Up from Hospital     Pt was in [facility] 9/12-25/2017. 87yoM Cerebral blled/CVA with residula right sided Hemiparesis admitted for rehab.  Pt received PEG yesterdya due to inablity to swallow.     He is noted to be hypertensive. He has no complaints of h/a cp dyspnea     9/14/17:  Sleeping quietly. Alert upon exam.  No cpmplaints. BP much improved with ACE-I/HCTZ + BB         10/19/17: still at [facility] getting rehab. Right side arm contractured. BP difficult to take. Likely has PAD uE. Right arm BP 80/60  Pt is here with daughtere and grandaughter.   Pt not talkative    EKG:  Past Medical History:   Diagnosis Date    Abnormality of gait and mobility     Aphasia     Atrial fibrillation (HCC)     BPH (benign prostatic hypertrophy)     Chronic back pain     CVA (cerebrovascular accident) ([address]     Diabetic neuropathy ([address]     Dyslipidemia     Hemiparesis, left ([address]     Hyperlipidemia     Hypertension     Neuropathy (HCC)     Osteoarthritis     neck, back    Pacemaker     TBI (traumatic brain injury) ([address]     Traumatic Lt superior frontal lobe hematoma     Type II or unspecified type diabetes mellitus without mention of complication, uncontrolled     Vitamin D deficiency        Past Surgical History:   Procedure Laterality Date    CARDIAC PACEMAKER PLACEMENT Left 2015    HERNIA REPAIR      [place] EGD PERCUTANEOUS PLACEMENT GASTROSTOMY TUBE N/A 9/12/2017    EGD ESOPHAGOGASTRODUODENOSCOPY PEG TUBE INSERTION performed by Baudilio Whitman MD at [address] [facility] ESOPHAGOGASTRODUODENOSCOPY TRANSORAL DIAGNOSTIC N/A 9/12/2017    EGD ESOPHAGOGASTRODUODENOSCOPY performed by Prashanth Graham DO at [facility]       Family History   Problem Relation Age of Onset    Family history unknown: Yes       Social History     Social History    Marital status:      Spouse name: N/A    Number of children: N/A    Years of education: N/A     Social History Main Topics    Smoking status: Former Smoker     Types: Cigarettes    Smokeless tobacco: Never Used    Alcohol use No    Drug use: No    Sexual activity: No     Other Topics Concern    None     Social History Narrative    The patient lives alone in a two story home with two steps to enter the home. There is a bedroom and bathroom are on the first floor. His social support includes his granddaughter who lives next door and helps patient daily. He has a cane at home. It is not indicated that he was receiving community services prior to admission. He has history of falls prior to admission. He was independent with mobility with a quad cane prior to admission. He required assistance for self care prior to admission. His goal is to return home. No Known Allergies    Current Outpatient Prescriptions   Medication Sig Dispense Refill    doxazosin (CARDURA) 1 MG tablet Take 1 tablet by mouth nightly 30 tablet 3    albuterol (PROVENTIL) (2.5 MG/3ML) 0.083% nebulizer solution Take 2.5 mg by nebulization every 4 hours as needed for Wheezing or Shortness of Breath      CALCIUM CARBONATE PO Take 500 mg by mouth 2 times daily      citalopram (CELEXA) 10 MG tablet Take 10 mg by mouth daily      Coenzyme Q-10 100 MG CAPS Take by mouth 2 times daily      famotidine (PEPCID) 20 MG tablet Take 20 mg by mouth 2 times daily      ferrous sulfate 325 (65 Fe) MG tablet Take 325 mg by mouth daily (with breakfast)      LACTOBACILLUS PO Take 2 capsules by mouth daily      atorvastatin (LIPITOR) 10 MG tablet Take 10 mg by mouth daily      magnesium oxide (MAG-OX) 400 MG tablet Take 400 mg by mouth daily      metFORMIN (GLUCOPHAGE) 500 MG tablet Take 500 mg by mouth 2 times daily (with meals)      methadone (DOLOPHINE) 5 MG tablet Take 5 mg by mouth 2 times daily .       metoprolol tartrate (LOPRESSOR)

## 2017-10-24 NOTE — PROGRESS NOTES
[id number]  [phone] Fax    Subjective  Salina Salazar, 80 y.o. male presents today with:  Chief Complaint   Patient presents with    Hypertension     HPI  Patient is seen today for follow-up of hypertension. He had an episode over the weekend with low blood pressure, IV bolus was ordered, and he was apparently combative with nursing so order was given to send him to the ER, but apparently he never did go. Blood pressures reviewed and continue to run on the low side. He was seen by cardiology last week, and according to Dr Adrian Morejon progress note, it appears that he should only be on metoprolol, with hold parameters. We will stop the lisinopril/HCTZ, but leave him on low-dose lisinopril for renal protection. He is diabetic, and last A1c showed good control at 6.3; he is on metformin. Review of Systems   Constitutional: Positive for fatigue. Negative for fever. Eyes: Negative for pain. Respiratory: Negative for cough, shortness of breath and wheezing. Cardiovascular: Negative for chest pain and leg swelling. Gastrointestinal: Negative for abdominal pain. Musculoskeletal: Positive for arthralgias and gait problem. Chronic pain, on methadone   Neurological: Positive for weakness. Negative for headaches. Psychiatric/Behavioral: Positive for confusion. The patient is nervous/anxious. No Known Allergies     Medications reviewed at facility. Objective  Vitals:    10/24/17 0840   BP: 92/70   Pulse: 81   Resp: 18   Temp: 97 °F (36.1 °C)   SpO2: 94%   Weight: 159 lb (72.1 kg)   Height: 5' 8\" (1.727 m)     Lab Results   Component Value Date    LABA1C 6.3 (H) 09/28/2017     Physical Exam   Constitutional: He appears unhealthy. No distress. Cardiovascular: Normal rate, regular rhythm and normal heart sounds. No murmur heard.   No lower extremity edema   Pulmonary/Chest: Effort normal and breath sounds normal. He has no wheezes. Abdominal: Soft. Bowel sounds are normal. There is no tenderness. Musculoskeletal: Normal range of motion. He exhibits no edema or tenderness. General weakness   Psychiatric: His mood appears anxious. He exhibits abnormal recent memory. He has a flat affect. Vitals reviewed. Assessment & Plan   1. Secondary hypertension  lisinopril (ZESTRIL) 2.5 MG tablet    Monitoring; on metoprolol with hold parameters. Stop Lisinpril/HCTZ. CBC, BMP in the AM.   2. Hypotension, unspecified hypotension type      Monitoring; Stop Lisinopril/HCTZ. 3. DM type 2, controlled, with complication (HCC)  lisinopril (ZESTRIL) 2.5 MG tablet    Controlled; on metformin. Continue low dose lisinopril for renal protection. Orders Placed This Encounter   Medications    lisinopril (ZESTRIL) 2.5 MG tablet     Sig: Take 1 tablet by mouth daily     Dispense:  30 tablet     Refill:  0     Medications reviewed and updated  Encourage fluid intake, exercise as tolerated, and good nutrition. Continue therapies, and continue to stress fall prevention strategies. Return for follow up as needed while in skilled rehab.     Haresh Avila, CNP

## 2017-10-27 NOTE — PROGRESS NOTES
Chief Complaint   Patient presents with    Claudication     per [facility]    Atrial Fibrillation     ekg 9/3/2017       10-27-17: Patient presents for initial medical evaluation. Patient is followed on a regular basis by Dr. Nikki Steele MD. Presents from [address] [place] [address], Dr. Damien Kirby for right foot/heal ulcer that is not healing well. S/p b/l LE art dupplex US with severe b/l disease, right NCIOLE is 0.4, left is 0.6. Patient is  Not talkative right now, but does talks normally at nursing home per his DPOA/GD/Consuelo. He had a CVA in 9/2017, also has hx of CVA in 2015 with right sided weakness in right UE. His functional capacity and quality of life has declined since 9/2017. Compliant with meds through feeding tube. Patient is [facility]. Per his GD, she would like to have everything done to heal the wound including undergoing a LE procedure. Follows up with dr. Saranya Ordaz. He does have a hx of Afibb, not on any OAC at this time. lavs reviewed.            Patient Active Problem List   Diagnosis    Secondary hypertension    Dyslipidemia    Type 2 diabetes mellitus ([address]    Diabetic neuropathy ([address]    Vitamin D deficiency    Osteoarthritis    CVA (cerebrovascular accident) ([address]    Atrial fibrillation ([address]    Hemiparesis, left ([address]    TBI (traumatic brain injury) ([address]    Traumatic Lt superior frontal lobe hematoma    Abnormality of gait and mobility due to TBI Lt superior frontal lobe hematoma, [facility] admit 09/07/17    Aphasia    Benign prostatic hyperplasia    Chronic back pain    Hyperlipidemia    Pacemaker    Intraparenchymal hemorrhage of brain (HCC)    Lobar cerebral hemorrhage (HCC)    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    Moderate episode of recurrent major depressive disorder ([address]       Past Surgical History:   Procedure Laterality Date    CARDIAC PACEMAKER PLACEMENT Left 2015    HERNIA REPAIR      ND EGD PERCUTANEOUS PLACEMENT GASTROSTOMY TUBE N/A 9/12/2017    EGD ESOPHAGOGASTRODUODENOSCOPY PEG TUBE INSERTION performed by Corbin Hdez MD at [address] [place] [address] ESOPHAGOGASTRODUODENOSCOPY TRANSORAL DIAGNOSTIC N/A 9/12/2017    EGD ESOPHAGOGASTRODUODENOSCOPY performed by Baudilio Adame DO at [place] [address] Marital status:      Spouse name: N/A    Number of children: N/A    Years of education: N/A     Social History Main Topics    Smoking status: Former Smoker     Types: Cigarettes    Smokeless tobacco: Never Used    Alcohol use No    Drug use: No    Sexual activity: No     Other Topics Concern    None     Social History Narrative    The patient lives alone in a two story home with two steps to enter the home. There is a bedroom and bathroom are on the first floor. His social support includes his granddaughter who lives next door and helps patient daily. He has a cane at home. It is not indicated that he was receiving community services prior to admission. He has history of falls prior to admission. He was independent with mobility with a quad cane prior to admission. He required assistance for self care prior to admission. His goal is to return home.         Family History   Problem Relation Age of Onset    Family history unknown: Yes       Current Outpatient Prescriptions   Medication Sig Dispense Refill    lisinopril (ZESTRIL) 2.5 MG tablet Take 1 tablet by mouth daily 30 tablet 0    doxazosin (CARDURA) 1 MG tablet Take 1 tablet by mouth nightly 30 tablet 3    albuterol (PROVENTIL) (2.5 MG/3ML) 0.083% nebulizer solution Take 2.5 mg by nebulization every 4 hours as needed for Wheezing or Shortness of Breath      CALCIUM CARBONATE PO Take 500 mg by mouth 2 times daily      citalopram (CELEXA) 10 MG tablet Take 10 mg by mouth daily      Coenzyme Q-10 100 MG CAPS Take by mouth 2 times daily      famotidine (PEPCID) 20 MG tablet Take 20 mg by mouth 2 times daily      ferrous sulfate 325 (65 Fe) MG tablet Take 325 mg coloration and turgor, no rashes, no suspicious skin lesions noted      No orders of the defined types were placed in this encounter. ASSESSMENT:    1. Dyslipidemia     2. Atrial fibrillation, unspecified type ([address]     3. Mixed hyperlipidemia     4. Pacemaker     5. Secondary hypertension           PLAN:     Patient will need to continue to follow up with you for their general medical care     As always, aggressive risk factor modification is strongly recommended. We should adhere to the [address] VII guidelines for HTN management and the NCEP ATP III guidelines for LDL-C management. Cardiac diet is always recommended with low fat, cholesterol, calories and sodium. Continue medications at current doses. Will schedule patient for bilateral lower extremity angiogram to rule out obstructive PAD. Family [facility] is agreeable with procedure. Understands risk and benefits. Patient was advised and encouraged to check blood pressure at home or at a pharmacy, maintain a logbook, and also call us back if blood pressure are above the target ranges or if it is low. Patient clearly understands and agrees to the instructions. We will need to continue to monitor muscle and liver enzymes, BUN, CR, and electrolytes.

## 2017-11-09 NOTE — ANESTHESIA POSTPROCEDURE EVALUATION
Department of Anesthesiology  Postprocedure Note    Patient: Eddie Santoyo  MRN: [id number]  YOB: 1929  Date of evaluation: 11/9/2017  Time:  1:08 PM     Procedure Summary     Date:  11/09/17 Room / Location:  Cardiac Cath Services    Anesthesia Start:  1032 Anesthesia Stop:      Procedure:  CATH LAB WITH ANESTHESIA Diagnosis:      Scheduled Providers:   Responsible Provider:  Daly Goss MD    Anesthesia Type:  MAC ASA Status:  4          Anesthesia Type: MAC    Sabas Phase I:      Sabas Phase II:      Last vitals: Reviewed and per EMR flowsheets.        Anesthesia Post Evaluation    Patient location during evaluation: PACU  Patient participation: waiting for patient participation  Level of consciousness: sleepy but conscious  Pain score: 0  Airway patency: patent  Nausea & Vomiting: no nausea and no vomiting  Complications: no  Cardiovascular status: hemodynamically stable  Respiratory status: face mask, acceptable and spontaneous ventilation  Hydration status: euvolemic

## 2017-11-09 NOTE — PROGRESS NOTES
[facility] EMERGENCY [facility] AT [facility] Respiratory Therapy Evaluation   Current Order: PRN albuterol     Home Regimen:PRN albuterol     Ordering Physician:Lizette  Re-evaluation Date: na   Diagnosis:afib CVA     Patient Status: Stable / Unstable + Physician notified    The following MDI Criteria must be met in order to convert aerosol to MDI with spacer. If unable to meet, MDI will be converted to aerosol:  []  Patient able to demonstrate the ability to use MDI effectively  []  Patient alert and cooperative  []  Patient able to take deep breath with 5-10 second hold  []  Medication(s) available in this delivery method   []  Peak flow greater than or equal to 200 ml/min            Current Order Substituted To  (same drug, same frequency)   Aerosol to MDI [] Albuterol Sulfate 0.083% unit dose by aerosol Albuterol Sulfate MDI 2 puffs by inhalation with spacer    [] Levalbuterol 1.25 mg unit dose by aerosol Levalbuterol MDI 2 puffs by inhalation with spacer    [] Levalbuterol 0.63 mg unit dose by aerosol Levalbuterol MDI 2 puffs by inhalation with spacer    [] Ipratropium Bromide 0.02% unit dose by aerosol Ipratropium Bromide MDI 2 puffs by inhalation with spacer    [] Duoneb (Ipratropium + Albuterol) unit dose by aerosol Ipratropium MDI + Albuterol MDI 2 puffs by inhalation w/spacer   MDI to Aerosol [] Albuterol Sulfate MDI Albuterol Sulfate 0.083% unit dose by aerosol    [] Levalbuterol MDI 2 puffs by inhalation Levalbuterol 1.25 mg unit dose by aerosol    [] Ipratropium Bromide MDI by inhalation Ipratropium Bromide 0.02% unit dose by aerosol    [] Combivent (Ipratropium + Albuterol) MDI by inhalation Duoneb (Ipratropium + Albuterol) unit dose by aerosol   Treatment Assessment [Frequency/Schedule]:  Change frequency to: _____PRN_____________________________________________per Protocol, P&T, MEC      Points 0 1 2 3 4   Pulmonary Status  Non-Smoker  []   Smoking history   < 20 pack years  []   Smoking history  ?  20 pack years  [x]   Pulmonary Disorder  (acute or chronic)  []   Severe or Chronic w/ Exacerbation  []     Surgical Status No [x]   Surgeries     General []   Surgery Lower []   Abdominal Thoracic or []   Upper Abdominal Thoracic with  PulmonaryDisorder  []     Chest X-ray Clear/Not  Ordered     [x]  Chronic Changes  Results Pending  []  Infiltrates, atelectasis, pleural effusion, or edema  []  Infiltrates in more than one lobe []  Infiltrate + Atelectasis, &/or pleural effusion  []    Respiratory Pattern Regular,  RR = 12-20 [x]  Increased,  RR = 21-25 []  CHILDRESS, irregular,  or RR = 26-30 []  Decreased FEV1  or RR = 31-35 []  Severe SOB, use  of accessory muscles, or RR ? 35  []    Mental Status Alert, oriented,  Cooperative []  Confused but Follows commands [x]  Lethargic or unable to follow commands []  Obtunded  []  Comatose  []    Breath Sounds Clear to  auscultation  [x]  Decreased unilaterally or  in bases only []  Decreased  bilaterally  []  Crackles or intermittent wheezes []  Wheezes []    Cough Strong, Spontan., & nonproductive [x]  Strong,  spontaneous, &  productive []  Weak,  Nonproductive []  Weak, productive or  with wheezes []  No spontaneous  cough or may require suctioning []    Level of Activity Ambulatory []  Ambulatory w/ Assist  [x]  Non-ambulatory []  Paraplegic []  Quadriplegic []    Total    Score:_4______     Triage Score:__5______      Tri       Triage:     1. (>20) Freq: Q3    2. (16-20) Freq: Q4   3. (11-15) Freq: QID & Albuterol Q2 PRN    4. (6-10) Freq: TID & Albuterol Q2 PRN    5. (0-5) Freq Q4prn

## 2017-11-09 NOTE — PROGRESS NOTES
Pt being taken to pre and post cath lab no bleeding or hematoma noted by pedal or groin watson at this time dressing dry and intact

## 2017-11-09 NOTE — PROGRESS NOTES
Act being drawn at this time 80  Dr dyson stated it was ok to pull sheath   Dr. Marvel Soria spoke with bridgette Joy rn manager of cath lab services

## 2017-11-09 NOTE — PROGRESS NOTES
Hemostasis for lt sheath was 1412 no bleeding or hematoma noted dressing dry and intact sandbag was initiated at that time

## 2017-11-09 NOTE — ANESTHESIA PRE PROCEDURE
Historical Provider, MD   atorvastatin (LIPITOR) 10 MG tablet 10 mg by PEG Tube route daily    Yes Historical Provider, MD   magnesium oxide (MAG-OX) 400 MG tablet 400 mg by PEG Tube route daily    Yes Historical Provider, MD   metFORMIN (GLUCOPHAGE) 500 MG tablet 500 mg by PEG Tube route 2 times daily    Yes Historical Provider, MD   methadone (DOLOPHINE) 5 MG tablet 5 mg by PEG Tube route 2 times daily  . Yes Historical Provider, MD   metoprolol tartrate (LOPRESSOR) 50 MG tablet 50 mg by PEG Tube route 2 times daily 1 tablet every morning and 1 tablet at bedtime. Hold for PGK,89 and Diastolic <88.    Yes Historical Provider, MD       Current medications:    Current Facility-Administered Medications   Medication Dose Route Frequency Provider Last Rate Last Dose    sodium chloride 0.9 % infusion             fentaNYL (SUBLIMAZE) injection 50 mcg  50 mcg Intravenous Q10 Min PRN Kinza Pederson MD        HYDROmorphone (DILAUDID) injection 0.5 mg  0.5 mg Intravenous Q10 Min PRN Kinza Pederson MD        HYDROcodone-acetaminophen [facility]) 5-325 MG per tablet 1 tablet  1 tablet Oral PRN Kinza Pederson MD        Or    HYDROcodone-acetaminophen [facility]) 5-325 MG per tablet 2 tablet  2 tablet Oral PRN Kinza Pederson MD        diphenhydrAMINE (BENADRYL) injection 12.5 mg  12.5 mg Intravenous Once PRN Kinza Pederson MD        ondansetron [facility]) injection 4 mg  4 mg Intravenous Once PRN Kinza Pederson MD        metoclopramide [facility]) injection 10 mg  10 mg Intravenous Once PRN Kinza Pederson MD        meperidine (DEMEROL) injection 12.5 mg  12.5 mg Intravenous Q5 Min PRN Kinza Pederson MD        albuterol (PROVENTIL) nebulizer solution 2.5 mg  2.5 mg Nebulization Q4H PRN Kane ALBRIGHT Holiday, DO        citalopram (CELEXA) tablet 10 mg  10 mg PEG Tube Daily Kane ALBRIGHT Holiday, DO   10 mg at 11/08/17 2309    famotidine (PEPCID) tablet 20 mg  20 mg PEG Tube BID Kane ALBRIGHT Units Subcutaneous Q6H earthmine Staggers, DO   Stopped at 11/09/17 0100       Allergies:  No Known Allergies    Problem List:    Patient Active Problem List   Diagnosis Code    Secondary hypertension I15.9    Dyslipidemia E78.5    Type 2 diabetes mellitus ([address] E11.9    Diabetic neuropathy ([address] E11.40    Vitamin D deficiency E55.9    Osteoarthritis M19.90    CVA (cerebrovascular accident) ([address] I63.9    Atrial fibrillation ([address] I48.91    Hemiparesis, left ([address] G81.94    TBI (traumatic brain injury) ([address] S06. 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    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 Z79.899    Moderate episode of recurrent major depressive disorder ([address] F33.1       Past Medical History:        Diagnosis Date    Abnormality of gait and mobility     Aphasia     Atrial fibrillation (HCC)     BPH (benign prostatic hypertrophy)     Chronic back pain     CVA (cerebrovascular accident) ([address]     Diabetic neuropathy ([address]     Dyslipidemia     Hemiparesis, left ([address]     Hyperlipidemia     Hypertension     Neuropathy (HCC)     Osteoarthritis     neck, back    Pacemaker     TBI (traumatic brain injury) ([address]     Traumatic Lt superior frontal lobe hematoma     Type II or unspecified type diabetes mellitus without mention of complication, uncontrolled     Vitamin D deficiency        Past Surgical History:        Procedure Laterality Date    CARDIAC PACEMAKER PLACEMENT Left 2015    HERNIA REPAIR      HI EGD PERCUTANEOUS PLACEMENT GASTROSTOMY TUBE N/A 9/12/2017    EGD ESOPHAGOGASTRODUODENOSCOPY PEG TUBE INSERTION

## 2017-11-09 NOTE — CARE COORDINATION
LSW spoke to Héctor Lopez at [facility]. Héctor Lopez states she talked to the pt's grand daughter and discharge plan is to return to [facility]. Pt is short term and not a bed hold, but [facility] will have a bed available. Héctor Lopez is aware pt was a 1:1 due to confusion. Per RN, the 1:1 was removed today and the grand daughter is currently here with the pt.   Electronically signed by ROBERT Rm on 11/9/17 at 12:15 PM

## 2017-11-10 NOTE — CARE COORDINATION
Pt is set for discharge(to return to [place]) at 3:30 pm by cot as pt has O2 and history of CVA, TBI, and was taken off a 1:1 this morning at 9:30 am per the RN. Caterina Cyr of [facility], states the team is agreeable pt can return even though he was on the 1:1. Pt's grand daughter, Patrice Wade, also notified.  Electronically signed by ROBERT Skinner on 11/10/17 at 1:09 PM

## 2017-11-10 NOTE — PROGRESS NOTES
Sameera updated patient tolerating not having 1:1. No pulling at tubing or IVF's. Safety maintained. Call light in reach. Will continue to evaluate and plan transfer back to [address] [place] [address] this p.m.

## 2017-11-10 NOTE — DISCHARGE SUMMARY
saved. Please see Dr. Thanh Armstrong surgical notes for completeness.        Labs:   Recent Results (from the past 72 hour(s))   TYPE AND SCREEN    Collection Time: 11/08/17 12:50 PM   Result Value Ref Range    ABO/Rh O POS     Antibody Screen NEG    POCT Glucose    Collection Time: 11/08/17  8:29 PM   Result Value Ref Range    POC Glucose 85 60 - 115 mg/dl    Performed on ACCU-CHEK    POCT Glucose    Collection Time: 11/09/17 12:13 AM   Result Value Ref Range    POC Glucose 86 60 - 115 mg/dl    Performed on ACCU-CHEK    POCT Glucose    Collection Time: 11/09/17  1:05 AM   Result Value Ref Range    POC Glucose 94 60 - 115 mg/dl    Performed on ACCU-CHEK    Basic metabolic panel    Collection Time: 11/09/17  5:43 AM   Result Value Ref Range    Sodium 143 132 - 144 mEq/L    Potassium 4.2 3.5 - 5.1 mEq/L    Chloride 104 98 - 107 mEq/L    CO2 23 22 - 29 mEq/L    Anion Gap 16 (H) 7 - 13 mEq/L    Glucose 86 74 - 109 mg/dL    BUN 33 (H) 8 - 23 mg/dL    CREATININE 0.84 0.70 - 1.20 mg/dL    GFR Non-African American >60.0 >60    GFR  >60.0 >60    Calcium 9.3 8.6 - 10.2 mg/dL   CBC    Collection Time: 11/09/17  5:43 AM   Result Value Ref Range    WBC 6.3 4.8 - 10.8 K/uL    RBC 4.13 (L) 4.70 - 6.10 M/uL    Hemoglobin 13.4 (L) 14.0 - 18.0 g/dL    Hematocrit 39.8 (L) 42.0 - 52.0 %    MCV 96.4 80.0 - 100.0 fL    MCH 32.4 (H) 27.0 - 31.3 pg    MCHC 33.6 33.0 - 37.0 %    RDW 14.9 (H) 11.5 - 14.5 %    Platelets 784 200 - 387 K/uL   POCT Glucose    Collection Time: 11/09/17  6:02 AM   Result Value Ref Range    POC Glucose 89 60 - 115 mg/dl    Performed on ACCU-CHEK    POCT Arterial    Collection Time: 11/09/17 11:48 AM   Result Value Ref Range    ACT-K 219 (H) 82 - 152 sec    Sample Type ART     Performed on SEE BELOW    POCT Arterial    Collection Time: 11/09/17 12:17 PM   Result Value Ref Range    ACT-K 235 (H) 82 - 152 sec    Sample Type ART     Performed on SEE BELOW    POCT Arterial    Collection Time: 11/09/17  1:47 PM Result Value Ref Range    ACT-K 191 (H) 82 - 152 sec    Sample Type ART     Performed on SEE BELOW    POCT Glucose    Collection Time: 11/09/17  5:35 PM   Result Value Ref Range    POC Glucose 71 60 - 115 mg/dl    Performed on ACCU-CHEK    POCT Glucose    Collection Time: 11/10/17 12:18 AM   Result Value Ref Range    POC Glucose 192 (H) 60 - 115 mg/dl    Performed on ACCU-CHEK    POCT Glucose    Collection Time: 11/10/17  5:56 AM   Result Value Ref Range    POC Glucose 106 60 - 115 mg/dl    Performed on ACCU-CHEK    Basic metabolic panel    Collection Time: 11/10/17  7:00 AM   Result Value Ref Range    Sodium 140 132 - 144 mEq/L    Potassium 4.3 3.5 - 5.1 mEq/L    Chloride 103 98 - 107 mEq/L    CO2 24 22 - 29 mEq/L    Anion Gap 13 7 - 13 mEq/L    Glucose 121 (H) 74 - 109 mg/dL    BUN 27 (H) 8 - 23 mg/dL    CREATININE 0.82 0.70 - 1.20 mg/dL    GFR Non-African American >60.0 >60    GFR  >60.0 >60    Calcium 8.2 (L) 8.6 - 10.2 mg/dL   CBC    Collection Time: 11/10/17  7:00 AM   Result Value Ref Range    WBC 7.7 4.8 - 10.8 K/uL    RBC 3.57 (L) 4.70 - 6.10 M/uL    Hemoglobin 11.4 (L) 14.0 - 18.0 g/dL    Hematocrit 34.4 (L) 42.0 - 52.0 %    MCV 96.4 80.0 - 100.0 fL    MCH 31.9 (H) 27.0 - 31.3 pg    MCHC 33.1 33.0 - 37.0 %    RDW 14.8 (H) 11.5 - 14.5 %    Platelets 379 (L) 533 - 400 K/uL           Follow-up visits:   Lena Bueno MD  [address] [place] [id number]  [phone]          [address], DO  [phone]    In 3 weeks         Assessment:  Active Hospital Problems    Diagnosis Date Noted    Critical lower limb ischemia [I99.8]      Priority: High         Plan:   1. Okay to discharge SNF        Electronically signed by Ventura Maciel NP on 11/10/2017 at 9:50 AM      Attending Supervising [de-identified] Attestation Statement  The patient is a 80 y.o. male.  I have performed a history and physical examination of the patient. I discussed the case with the nurse practitioner. I reviewed the patient's Past Medical History, Past Surgical History, Medications, and Allergies. Physical Exam:  Vitals:    11/10/17 0417 11/10/17 0500 11/10/17 0751 11/10/17 0752   BP: 95/62  (!) 95/53 (!) 95/53   Pulse: 69  60 59   Resp: 18  18 18   Temp: 97.3 °F (36.3 °C)  97.1 °F (36.2 °C) 97 °F (36.1 °C)   TempSrc: Oral  Axillary Oral   SpO2: 100%  92% 92%   Weight:  168 lb 10.4 oz (76.5 kg)     Height:           Review of Systems - Respiratory ROS: no cough, shortness of breath, or wheezing  Cardiovascular ROS: no chest pain or dyspnea on exertion  Gastrointestinal ROS: no abdominal pain, change in bowel habits, or black or bloody stools    Pulmonary/Chest: clear to auscultation bilaterally- no wheezes, rales or rhonchi, normal air movement, no respiratory distress  Cardiovascular: normal rate, normal S1 and S2, no gallops, intact distal pulses and no carotid bruits  Abdomen: soft, non-tender, non-distended, normal bowel sounds, no masses or organomegaly    Impression/Plan  I reviewed and agree with the findings and plan documented in his note . Active Hospital Problems    Diagnosis Date Noted    Critical lower limb ischemia [I99.8]      Priority: High       Doing well. Groin site minimal hematoma and ecchymosis.  No cp  Tele Paced 60  Electronically signed by Guicho Mejia MD on 11/10/17 at 10:03 AM

## 2017-11-10 NOTE — PROGRESS NOTES
Patient continues to remain calm and cooperative, no signs of pulling at IV's or tubings. Call light in reach. Hourly rounding. Family updated he may go back this p.m.  Remains off 1:1.

## 2017-11-16 NOTE — PROCEDURES
[address]                       [address] [facility], [address] [facility]                                  PROCEDURE NOTE    PATIENT NAME: Deshaun Dent                    :        10/28/1929  MED REC NO:   [id number]                            ROOM:       [id number]  ACCOUNT NO:   [de-identified]                           ADMIT DATE: 2017  PROVIDER:     Haydee Bauer DO    DATE OF PROCEDURE:  2017    OPERATION PERFORMED:  Bilateral lower extremity angiography, right SFA and  popliteal artery stenting, right anterior tibial artery and TP trunk PTA. INDICATION:  Critical limb ischemia. PROCEDURE PERFORMED BY:  Kane Bauer DO    COMPLICATIONS:  None. ACCESS SITES:  Left common femoral artery and right posterior tibial  artery. DESCRIPTION OF THE PROCEDURE:  The patient was brought to the cardiac cath  lab suite where he was sterilely prepped and draped in the usual fashion. 1% lidocaine was used to anesthetize the left inguinal area. A 4-Belizean  arterial sheath was placed without any difficulty. Next, a 4-Belizean GAETANO  was used to engage in the infrarenal abdominal aorta. Distal abdominal  aortogram with bilateral iliac angiography was performed. Catheter was  then engaged in the right common iliac artery. Right lower extremity  angiogram with runoff was performed. Next, a 6-Belizean 55 cm sheath was  placed over the wire into the right mid SFA and access was also obtained in  the right posterior tibial artery under ultrasound guidance. Eventually, a  6-Belizean _____ sheath was placed in the posterior tibial artery. Next, an  0.014 wire along with support catheters was placed in antegrade and  retrograde fashion. Eventually, the 0.014 wire was externalized through  the Navicross and PTA was performed across the entirety of the right SFA,  popliteal artery as well as anterior tibial artery and TP trunk.   Please  see nursing note for details of the balloons utilized. Complete  angiography was performed. Then, Supera stents were overlaid into the  distal popliteal artery into the mid to distal SFA. Please see nursing  notes for details of the balloons utilized. Complete angiography was  performed showing good results. The patient tolerated the procedure well. All catheters and wires were removed from the patient. Next, angiogram of  the left lower extremity was performed. The patient was transferred to the  postcath holding area in stable and satisfactory condition. FINDINGS:  1. Infrarenal abdominal aortogram with a normal caliber aorta with no  evidence of any aneurysm, dissection, or stenosis. 2.  Left lower extremity. Left common iliac artery, external iliac artery,  common femoral artery are patent. Left profunda appears to be 100%  occluded proximally. The left SFA has diffuse disease with 70% proximal  stenosis, 70% mid and 80% to distal Herrera's canal.  The left popliteal  artery has a 50% P1 segment stenosis. The remainder has mild diffuse  disease. The left anterior tibial artery appears to be 100% occluded  proximally. The left TP trunk, peroneal, and posterior tibial artery  appear to have mild-to-moderate diffuse disease. No focal stenosis. 3.  Right lower extremity:  The right common iliac artery, external iliac  artery, and common femoral artery are patent. The right profunda is patent  without any significant stenosis. The right SFA has severe diffuse disease  with distally 90% stenosis and 100% stenosis at the proximal popliteal  artery and reconstitutes in the distal segment prior to the bifurcation of  the anterior tibial artery and TP trunk. The right TP trunk was occluded  at 100%. Right peroneal artery and posterior tibial artery are patent  without any significant stenosis. Right anterior tibial artery has an 80%  ostial stenosis.     ASSESSMENT:  1.  Status post successful right distal SFA popliteal artery PTA

## 2017-11-21 NOTE — PROGRESS NOTES
[address]  [phone] Fax    Subjective  Saundra Flores, 80 y.o. male presents today with:  Chief Complaint   Patient presents with   Leala Dick     HPI  Patient is seen today for routine follow-up of generalized weakness. He continues to receive therapies, but is not making much progress. He is requiring a mechanical lift for transfers. He remains at high risk for falls. He is on tube feedings, and is working with speech therapy. We'll check labs in the morning. Review of Systems   Constitutional: Positive for fatigue. HENT: Positive for trouble swallowing. Musculoskeletal: Positive for gait problem. Psychiatric/Behavioral: Positive for confusion. ROS limited as patient due to confusion    No Known Allergies     Medications reviewed at facility. Objective  Vitals:    10/31/17 0904   BP: 122/60   Pulse: 70   Resp: 18   Temp: 97.4 °F (36.3 °C)   SpO2: 96%   Weight: 154 lb (69.9 kg)   Height: 5' 8\" (1.727 m)     Physical Exam   Constitutional: He appears unhealthy. No distress. Neck: Normal range of motion. Cardiovascular: Normal rate, regular rhythm and normal heart sounds. No murmur heard. No lower extremity edema   Pulmonary/Chest: Effort normal and breath sounds normal. He has no wheezes. Abdominal: Soft. Bowel sounds are normal. There is no tenderness. PEG tube   Musculoskeletal: Normal range of motion. He exhibits no edema or tenderness. General weakness   Psychiatric: His mood appears anxious. He exhibits abnormal recent memory. He has a flat affect. Vitals reviewed. Assessment & Plan   1. Generalized weakness      Monitoring, continue PT/OT   2. Dysphagia, unspecified type      Continue speech therapy, on tube feedings. CBC, BMP in the a.m. Medications reviewed and updated  Encourage fluid intake, exercise as tolerated, and good nutrition.   Continue therapies, and continue to stress fall

## 2017-11-27 NOTE — PROGRESS NOTES
[address]  [phone] Fax    Subjective  Sanna Hagan, 80 y.o. male presents today with:  Chief Complaint   Patient presents with   Emely Franco     [facility]  Patient seen today for follow-up of generalized weakness. He continues to require a Floyd lift for transfers, and is receiving therapies. He is receiving wound care to his right foot, and has a flat red rash to both lower legs, which has just been noted today. He denies any pain or itchiness, nursing reports no new soap or lotion. He remains confused. Last labs reviewed and showed hemoglobin of 11.4, we'll monitor labs. Review of Systems   Respiratory: Negative for cough. Cardiovascular: Negative for chest pain. Gastrointestinal: Negative for abdominal pain. Musculoskeletal: Positive for gait problem. Skin: Positive for rash. Psychiatric/Behavioral: Positive for confusion. ROS limited due to confusion    No Known Allergies     Medications reviewed at facility. Objective  Vitals:    11/14/17 0920   BP: 102/62   Pulse: 72   Resp: 16   Temp: 97.1 °F (36.2 °C)   SpO2: 96%   Weight: 160 lb 3.2 oz (72.7 kg)     Physical Exam   Constitutional: He appears unhealthy. No distress. Cardiovascular: Normal rate, regular rhythm and normal heart sounds. No murmur heard. No lower extremity edema   Pulmonary/Chest: Effort normal and breath sounds normal. He has no wheezes. Abdominal: Soft. Bowel sounds are normal. There is no tenderness. PEG tube   Musculoskeletal: Normal range of motion. He exhibits no edema or tenderness. General weakness   Skin: Rash noted. No erythema. Fine macular rash to bilateral lower legs   Psychiatric: His mood appears anxious. He exhibits abnormal recent memory. He has a flat affect. Vitals reviewed. Assessment & Plan   1. Generalized weakness      Monitoring; continue PT/OT   2.  Anemia, unspecified type      Stable; check CBC, BMP on 11/21   3. Rash      Monitoring; add Benadryl x 1 dose now, hydrocortisone 1% cream BID x 5 days. Call if no improvement     Medications reviewed and updated  Encourage fluid intake, exercise as tolerated, and good nutrition. Continue therapies, and continue to stress fall prevention strategies. Return for follow up as needed while in skilled rehab.     Nancy Carrero, CNP

## 2017-11-29 NOTE — PROGRESS NOTES
stable. He is afebrile 98.8, pulse was 70, blood pressure was 114/60. He is alert and oriented x2. Chronically ill in appearance. Pupils are small. Oral mucosa is moist.  Chest showed diminished breath sounds. No crackles, no wheezing. Cardiovascular exam showed a regular rate. Abdomen was soft, nontender. PEG tube is in place. Extremities showed trace dorsal pedal pulse on the right. ASSESSMENT AND PLAN:  1. Peripheral vascular disease:  The patient has been seen by critical ischemia group. 2.   Hypertension:  Blood pressure is stable. No orthostasis. 3.   Dysphagia: The patient receives ongoing localized care. 4.   ?Failure to thrive: The patient does have evidence of local wound ischemia. If the patient declines further, may consider referral to a palliative care hospice. We will monitor at this time. Please note, hospital records, imaging reports, consultant notes, and laboratory results are reviewed.         Electronically Signed By: Sola Arias M.D. on 11/24/2017 14:06:45  ______________________________  HESHAM Ace/[id number]  D: 11/17/2017 20:37:56  T: 11/18/2017 11:08:10    cc: - Homero Curling

## 2017-12-01 NOTE — PROGRESS NOTES
Chief Complaint   Patient presents with    Follow-Up from [facility]    Atrial Fibrillation    Hypertension    Hyperlipidemia       10-27-17: Patient presents for initial medical evaluation. Patient is followed on a regular basis by Dr. Cheryle Bunnell, MD. Presents from [address] [place] [address], Dr. Marella Gilford for right foot/heal ulcer that is not healing well. S/p b/l LE art dupplex US with severe b/l disease, right NICOLE is 0.4, left is 0.6. Patient is  Not talkative right now, but does talks normally at nursing home per his DPOA/GD/Consuelo. He had a CVA in 9/2017, also has hx of CVA in 2015 with right sided weakness in right UE. His functional capacity and quality of life has declined since 9/2017. Compliant with meds through feeding tube. Patient is [facility]. Per his GD, she would like to have everything done to heal the wound including undergoing a LE procedure. Follows up with dr. Te Resendez. He does have a hx of Afibb, not on any OAC at this time. lavs reviewed. 12-1-1: states right foot/heal ulcer is doing better. He is in physical therapy now. Has been doing some walking with walker or full assist. States his left LE is ok. Compliant with meds. No bleeding issues. Living at [address] [place] [address] now. GD present. Pt denies chest pain, dyspnea, dyspnea on exertion, change in exercise capacity, fatigue,  nausea, vomiting, diarrhea, constipation, motor weakness, insomnia, weight loss, syncope, dizziness, lightheadedness, palpitations, PND, orthopnea. No nitro use. BP and hr are good. No LE discoloration or ulcers. No LE edema. No CHF type symptoms. Lipid profile is normal.     ASSESSMENT:  1.  Status post successful right distal SFA popliteal artery PTA and  stenting with Supera stents with 0% residual stenosis. 2.  Status post successful right anterior tibial artery and TP trunk PTA  kissing balloon fashion with 0% residual stenosis. 3.  Severe left SFA disease as described above.   4.  100% left anterior tibial enter the home. There is a bedroom and bathroom are on the first floor. His social support includes his granddaughter who lives next door and helps patient daily. He has a cane at home. It is not indicated that he was receiving community services prior to admission. He has history of falls prior to admission. He was independent with mobility with a quad cane prior to admission. He required assistance for self care prior to admission. His goal is to return home. Family History   Problem Relation Age of Onset    Family history unknown: Yes       Current Outpatient Prescriptions   Medication Sig Dispense Refill    albuterol (PROVENTIL) (2.5 MG/3ML) 0.083% nebulizer solution Take 2.5 mg by nebulization every 4 hours as needed for Wheezing      aspirin 81 MG tablet Take 81 mg by mouth daily      calcium carbonate (OSCAL) 500 MG TABS tablet Take 500 mg by mouth 2 times daily      doxazosin (CARDURA) 1 MG tablet Take 1 mg by mouth nightly      citalopram (CELEXA) 10 MG tablet Take 10 mg by mouth every morning      COENZYME Q10 PO Take by mouth 2 times daily      ferrous sulfate 325 (65 Fe) MG tablet Take 325 mg by mouth daily (with breakfast)      Lactobacillus TABS Take 2 tablets by mouth every morning      atorvastatin (LIPITOR) 10 MG tablet Take 10 mg by mouth daily      metoprolol tartrate (LOPRESSOR) 50 MG tablet Take 50 mg by mouth 2 times daily      magnesium oxide (MAG-OX) 400 MG tablet Take 400 mg by mouth daily      metFORMIN (GLUCOPHAGE) 500 MG tablet Take 500 mg by mouth 2 times daily (with meals)      methadone (DOLOPHINE) 5 MG tablet Take 5 mg by mouth 2 times daily .       famotidine (PEPCID) 20 MG tablet Take 20 mg by mouth 2 times daily      clopidogrel (PLAVIX) 75 MG tablet Take 75 mg by mouth daily      acetaminophen (TYLENOL) 325 MG tablet Take 650 mg by mouth every 4 hours as needed for Pain      lisinopril (PRINIVIL;ZESTRIL) 2.5 MG tablet Take 2.5 mg by mouth daily No current facility-administered medications for this visit. Review of patient's allergies indicates no known allergies. Review of Systems:  General ROS: negative  Psychological ROS: negative  Hematological and Lymphatic ROS: No history of blood clots or bleeding disorder. Respiratory ROS: no cough, shortness of breath, or wheezing  Cardiovascular ROS: no chest pain or dyspnea on exertion  Gastrointestinal ROS: no abdominal pain, change in bowel habits, or black or bloody stools  Genito-Urinary ROS: no dysuria, trouble voiding, or hematuria  Musculoskeletal ROS: negative  Neurological ROS: no TIA or stroke symptoms  Dermatological ROS: negative    VITALS:  Blood pressure 110/60, pulse 67. There is no height or weight on file to calculate BMI. Physical Examination:  General appearance - normal appearing weight  Mental status - alert, oriented to person, place, and time  Neck - Neck is supple, no JVD or carotid bruits. No thyromegaly or adenopathy. Chest - clear to auscultation, no wheezes, rales or rhonchi, symmetric air entry  Heart - normal rate, regular rhythm, normal S1, S2, no murmurs, rubs, clicks or gallops  Abdomen - soft, nontender, nondistended, no masses or organomegaly  Neurological - alert, oriented, normal speech, no focal findings or movement disorder noted  Extremities - peripheral pulses abnormal,, no clubbing or . + right heal circumscribed ulcer. Discoloration of foot. Skin - normal coloration and turgor, no rashes, no suspicious skin lesions noted      Orders Placed This Encounter   Procedures    EKG 12 Lead       ASSESSMENT:    1. Atrial fibrillation, unspecified type (HCC)  EKG 12 Lead    CANCELED: EKG 12 Lead   2. Critical lower limb ischemia     3. Secondary hypertension     4. Dyslipidemia           PLAN:     Patient will need to continue to follow up with you for their general medical care     As always, aggressive risk factor modification is strongly recommended.
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
## 2018-01-01 ENCOUNTER — TELEPHONE (OUTPATIENT)
Dept: PALLATIVE CARE | Age: 83
End: 2018-01-01

## 2018-01-01 ENCOUNTER — OFFICE VISIT (OUTPATIENT)
Dept: GERIATRIC MEDICINE | Age: 83
End: 2018-01-01
Payer: MEDICARE

## 2018-01-01 ENCOUNTER — OFFICE VISIT (OUTPATIENT)
Dept: PALLATIVE CARE | Age: 83
End: 2018-01-01
Payer: MEDICARE

## 2018-01-01 ENCOUNTER — HOSPITAL ENCOUNTER (EMERGENCY)
Age: 83
Discharge: HOME | End: 2018-04-29
Payer: MEDICARE

## 2018-01-01 ENCOUNTER — APPOINTMENT (OUTPATIENT)
Dept: CT IMAGING | Age: 83
End: 2018-01-01
Payer: MEDICARE

## 2018-01-01 ENCOUNTER — OFFICE VISIT (OUTPATIENT)
Dept: GERIATRIC MEDICINE | Age: 83
End: 2018-01-01
Payer: COMMERCIAL

## 2018-01-01 ENCOUNTER — OFFICE VISIT (OUTPATIENT)
Dept: CARDIOLOGY CLINIC | Age: 83
End: 2018-01-01
Payer: MEDICARE

## 2018-01-01 ENCOUNTER — HOSPITAL ENCOUNTER (INPATIENT)
Age: 83
LOS: 1 days | Discharge: HOSPICE | DRG: 812 | End: 2018-05-13
Attending: STUDENT IN AN ORGANIZED HEALTH CARE EDUCATION/TRAINING PROGRAM | Admitting: INTERNAL MEDICINE
Payer: MEDICARE

## 2018-01-01 VITALS
SYSTOLIC BLOOD PRESSURE: 100 MMHG | DIASTOLIC BLOOD PRESSURE: 50 MMHG | OXYGEN SATURATION: 95 % | HEART RATE: 60 BPM | RESPIRATION RATE: 16 BRPM

## 2018-01-01 VITALS
OXYGEN SATURATION: 93 % | HEART RATE: 77 BPM | TEMPERATURE: 98.3 F | SYSTOLIC BLOOD PRESSURE: 100 MMHG | RESPIRATION RATE: 18 BRPM | DIASTOLIC BLOOD PRESSURE: 59 MMHG | HEIGHT: 68 IN | WEIGHT: 152.78 LBS | BODY MASS INDEX: 23.15 KG/M2

## 2018-01-01 VITALS
DIASTOLIC BLOOD PRESSURE: 78 MMHG | HEART RATE: 68 BPM | OXYGEN SATURATION: 97 % | SYSTOLIC BLOOD PRESSURE: 124 MMHG | RESPIRATION RATE: 16 BRPM

## 2018-01-01 VITALS
HEIGHT: 68 IN | HEART RATE: 73 BPM | SYSTOLIC BLOOD PRESSURE: 121 MMHG | BODY MASS INDEX: 24.71 KG/M2 | TEMPERATURE: 97.6 F | WEIGHT: 163 LBS | DIASTOLIC BLOOD PRESSURE: 70 MMHG | OXYGEN SATURATION: 97 % | RESPIRATION RATE: 18 BRPM

## 2018-01-01 VITALS
DIASTOLIC BLOOD PRESSURE: 64 MMHG | SYSTOLIC BLOOD PRESSURE: 110 MMHG | RESPIRATION RATE: 16 BRPM | OXYGEN SATURATION: 96 % | HEART RATE: 70 BPM

## 2018-01-01 VITALS
RESPIRATION RATE: 18 BRPM | HEART RATE: 88 BPM | BODY MASS INDEX: 24.1 KG/M2 | SYSTOLIC BLOOD PRESSURE: 122 MMHG | DIASTOLIC BLOOD PRESSURE: 82 MMHG | TEMPERATURE: 98.2 F | OXYGEN SATURATION: 95 % | HEIGHT: 68 IN | WEIGHT: 159 LBS

## 2018-01-01 VITALS
RESPIRATION RATE: 16 BRPM | DIASTOLIC BLOOD PRESSURE: 42 MMHG | OXYGEN SATURATION: 95 % | HEIGHT: 68 IN | BODY MASS INDEX: 23.64 KG/M2 | SYSTOLIC BLOOD PRESSURE: 134 MMHG | WEIGHT: 156 LBS | HEART RATE: 71 BPM | TEMPERATURE: 97.3 F

## 2018-01-01 VITALS
WEIGHT: 159 LBS | HEART RATE: 72 BPM | RESPIRATION RATE: 17 BRPM | SYSTOLIC BLOOD PRESSURE: 122 MMHG | TEMPERATURE: 98.5 F | DIASTOLIC BLOOD PRESSURE: 67 MMHG | BODY MASS INDEX: 24.1 KG/M2 | OXYGEN SATURATION: 99 % | HEIGHT: 68 IN

## 2018-01-01 VITALS — DIASTOLIC BLOOD PRESSURE: 70 MMHG | SYSTOLIC BLOOD PRESSURE: 120 MMHG | HEART RATE: 68 BPM

## 2018-01-01 DIAGNOSIS — E78.2 MIXED HYPERLIPIDEMIA: ICD-10-CM

## 2018-01-01 DIAGNOSIS — E78.5 DYSLIPIDEMIA: ICD-10-CM

## 2018-01-01 DIAGNOSIS — R45.86 LABILITY EMOTIONAL: ICD-10-CM

## 2018-01-01 DIAGNOSIS — I10 HYPERTENSION, UNSPECIFIED TYPE: ICD-10-CM

## 2018-01-01 DIAGNOSIS — M15.9 PRIMARY OSTEOARTHRITIS INVOLVING MULTIPLE JOINTS: Primary | ICD-10-CM

## 2018-01-01 DIAGNOSIS — G89.29 OTHER CHRONIC PAIN: Primary | ICD-10-CM

## 2018-01-01 DIAGNOSIS — I48.91 ATRIAL FIBRILLATION, UNSPECIFIED TYPE (HCC): Primary | ICD-10-CM

## 2018-01-01 DIAGNOSIS — I95.9 TRANSIENT HYPOTENSION: ICD-10-CM

## 2018-01-01 DIAGNOSIS — K59.01 SLOW TRANSIT CONSTIPATION: ICD-10-CM

## 2018-01-01 DIAGNOSIS — F01.518 VASCULAR DEMENTIA WITH BEHAVIOR DISTURBANCE: Primary | ICD-10-CM

## 2018-01-01 DIAGNOSIS — R63.0 ANOREXIA: ICD-10-CM

## 2018-01-01 DIAGNOSIS — M54.5 CHRONIC LOW BACK PAIN, UNSPECIFIED BACK PAIN LATERALITY, WITH SCIATICA PRESENCE UNSPECIFIED: Primary | ICD-10-CM

## 2018-01-01 DIAGNOSIS — F03.918 DEMENTIA WITH AGGRESSIVE BEHAVIOR: Primary | ICD-10-CM

## 2018-01-01 DIAGNOSIS — G89.29 OTHER CHRONIC PAIN: ICD-10-CM

## 2018-01-01 DIAGNOSIS — F03.90 DEMENTIA WITHOUT BEHAVIORAL DISTURBANCE, UNSPECIFIED DEMENTIA TYPE: ICD-10-CM

## 2018-01-01 DIAGNOSIS — Z95.0 PACEMAKER: ICD-10-CM

## 2018-01-01 DIAGNOSIS — G89.4 CHRONIC PAIN SYNDROME: Primary | ICD-10-CM

## 2018-01-01 DIAGNOSIS — D64.9 ANEMIA, UNSPECIFIED TYPE: ICD-10-CM

## 2018-01-01 DIAGNOSIS — Z79.4 TYPE 2 DIABETES MELLITUS WITH STAGE 1 CHRONIC KIDNEY DISEASE, WITH LONG-TERM CURRENT USE OF INSULIN (HCC): ICD-10-CM

## 2018-01-01 DIAGNOSIS — M25.50 ARTHRALGIA, UNSPECIFIED JOINT: ICD-10-CM

## 2018-01-01 DIAGNOSIS — G89.29 CHRONIC LOW BACK PAIN WITHOUT SCIATICA, UNSPECIFIED BACK PAIN LATERALITY: Primary | ICD-10-CM

## 2018-01-01 DIAGNOSIS — E11.42 TYPE 2 DIABETES MELLITUS WITH DIABETIC POLYNEUROPATHY, UNSPECIFIED LONG TERM INSULIN USE STATUS: Primary | ICD-10-CM

## 2018-01-01 DIAGNOSIS — E11.22 TYPE 2 DIABETES MELLITUS WITH STAGE 1 CHRONIC KIDNEY DISEASE, WITH LONG-TERM CURRENT USE OF INSULIN (HCC): ICD-10-CM

## 2018-01-01 DIAGNOSIS — R45.86 EMOTIONAL LABILITY: Primary | ICD-10-CM

## 2018-01-01 DIAGNOSIS — E78.5 HYPERLIPIDEMIA, UNSPECIFIED HYPERLIPIDEMIA TYPE: ICD-10-CM

## 2018-01-01 DIAGNOSIS — G89.29 CHRONIC LOW BACK PAIN, UNSPECIFIED BACK PAIN LATERALITY, WITH SCIATICA PRESENCE UNSPECIFIED: Primary | ICD-10-CM

## 2018-01-01 DIAGNOSIS — I10 ESSENTIAL HYPERTENSION: ICD-10-CM

## 2018-01-01 DIAGNOSIS — Z71.89 OTHER SPECIFIED COUNSELING: ICD-10-CM

## 2018-01-01 DIAGNOSIS — Z51.5 PALLIATIVE CARE ENCOUNTER: ICD-10-CM

## 2018-01-01 DIAGNOSIS — M54.50 CHRONIC LOW BACK PAIN WITHOUT SCIATICA, UNSPECIFIED BACK PAIN LATERALITY: Primary | ICD-10-CM

## 2018-01-01 DIAGNOSIS — K21.9 GASTROESOPHAGEAL REFLUX DISEASE, ESOPHAGITIS PRESENCE NOT SPECIFIED: ICD-10-CM

## 2018-01-01 DIAGNOSIS — N18.1 TYPE 2 DIABETES MELLITUS WITH STAGE 1 CHRONIC KIDNEY DISEASE, WITH LONG-TERM CURRENT USE OF INSULIN (HCC): ICD-10-CM

## 2018-01-01 DIAGNOSIS — K92.2 GASTROINTESTINAL HEMORRHAGE, UNSPECIFIED GASTROINTESTINAL HEMORRHAGE TYPE: Primary | ICD-10-CM

## 2018-01-01 DIAGNOSIS — N30.00 ACUTE CYSTITIS WITHOUT HEMATURIA: Primary | ICD-10-CM

## 2018-01-01 LAB
ALBUMIN SERPL-MCNC: 3 G/DL (ref 3.9–4.9)
ALBUMIN SERPL-MCNC: 3 G/DL (ref 3.9–4.9)
ALP BLD-CCNC: 540 U/L (ref 35–104)
ALP BLD-CCNC: 547 U/L (ref 35–104)
ALT SERPL-CCNC: 39 U/L (ref 0–41)
ALT SERPL-CCNC: 48 U/L (ref 0–41)
AMMONIA: 35 UMOL/L (ref 16–60)
AMORPHOUS: ABNORMAL
AMPHETAMINE SCREEN, URINE: NORMAL
ANION GAP SERPL CALCULATED.3IONS-SCNC: 11 MEQ/L (ref 7–13)
ANION GAP SERPL CALCULATED.3IONS-SCNC: 11 MEQ/L (ref 7–13)
ANION GAP SERPL CALCULATED.3IONS-SCNC: 12 MEQ/L (ref 7–13)
APTT: 23.7 SEC (ref 21.6–35.4)
AST SERPL-CCNC: 62 U/L (ref 0–40)
AST SERPL-CCNC: 63 U/L (ref 0–40)
BARBITURATE SCREEN URINE: NORMAL
BASOPHILS ABSOLUTE: 0.1 K/UL (ref 0–0.2)
BASOPHILS ABSOLUTE: 0.1 K/UL (ref 0–0.2)
BASOPHILS RELATIVE PERCENT: 1.2 %
BASOPHILS RELATIVE PERCENT: 1.7 %
BENZODIAZEPINE SCREEN, URINE: NORMAL
BILIRUB SERPL-MCNC: 1.6 MG/DL (ref 0–1.2)
BILIRUB SERPL-MCNC: 3.5 MG/DL (ref 0–1.2)
BILIRUBIN URINE: ABNORMAL
BLOOD, URINE: NEGATIVE
BUN BLDV-MCNC: 18 MG/DL (ref 8–23)
BUN BLDV-MCNC: 19 MG/DL (ref 8–23)
BUN BLDV-MCNC: 68 MG/DL (ref 8–23)
CALCIUM SERPL-MCNC: 8.2 MG/DL (ref 8.6–10.2)
CALCIUM SERPL-MCNC: 8.6 MG/DL (ref 8.6–10.2)
CALCIUM SERPL-MCNC: 8.9 MG/DL (ref 8.6–10.2)
CANNABINOID SCREEN URINE: NORMAL
CHLORIDE BLD-SCNC: 103 MEQ/L (ref 98–107)
CHLORIDE BLD-SCNC: 105 MEQ/L (ref 98–107)
CHLORIDE BLD-SCNC: 108 MEQ/L (ref 98–107)
CLARITY: CLEAR
CO2: 21 MEQ/L (ref 22–29)
CO2: 25 MEQ/L (ref 22–29)
CO2: 26 MEQ/L (ref 22–29)
COCAINE METABOLITE SCREEN URINE: NORMAL
COLOR: YELLOW
CREAT SERPL-MCNC: 0.72 MG/DL (ref 0.7–1.2)
CREAT SERPL-MCNC: 0.73 MG/DL (ref 0.7–1.2)
CREAT SERPL-MCNC: 0.86 MG/DL (ref 0.7–1.2)
EKG ATRIAL RATE: 99 BPM
EKG Q-T INTERVAL: 440 MS
EKG QRS DURATION: 164 MS
EKG QTC CALCULATION (BAZETT): 564 MS
EKG R AXIS: 241 DEGREES
EKG T AXIS: 86 DEGREES
EKG VENTRICULAR RATE: 99 BPM
EOSINOPHILS ABSOLUTE: 0 K/UL (ref 0–0.7)
EOSINOPHILS ABSOLUTE: 0.3 K/UL (ref 0–0.7)
EOSINOPHILS RELATIVE PERCENT: 0.4 %
EOSINOPHILS RELATIVE PERCENT: 5.4 %
ETHANOL PERCENT: NORMAL G/DL
ETHANOL: <10 MG/DL (ref 0–0.08)
GFR AFRICAN AMERICAN: >60
GFR NON-AFRICAN AMERICAN: >60
GLOBULIN: 2.9 G/DL (ref 2.3–3.5)
GLOBULIN: 3.5 G/DL (ref 2.3–3.5)
GLUCOSE BLD-MCNC: 151 MG/DL (ref 74–109)
GLUCOSE BLD-MCNC: 151 MG/DL (ref 74–109)
GLUCOSE BLD-MCNC: 67 MG/DL (ref 74–109)
GLUCOSE URINE: NEGATIVE MG/DL
HBA1C MFR BLD: 5.5 % (ref 4.8–5.9)
HCT VFR BLD CALC: 24 % (ref 42–52)
HCT VFR BLD CALC: 32.4 % (ref 42–52)
HCT VFR BLD CALC: 35 % (ref 42–52)
HCT VFR BLD CALC: 36.7 % (ref 42–52)
HEMOGLOBIN: 10.8 G/DL (ref 14–18)
HEMOGLOBIN: 11.6 G/DL (ref 14–18)
HEMOGLOBIN: 12.1 G/DL (ref 14–18)
HEMOGLOBIN: 8 G/DL (ref 14–18)
INR BLD: 1.1
KETONES, URINE: NEGATIVE MG/DL
LEUKOCYTE ESTERASE, URINE: ABNORMAL
LYMPHOCYTES ABSOLUTE: 1.7 K/UL (ref 1–4.8)
LYMPHOCYTES ABSOLUTE: 1.7 K/UL (ref 1–4.8)
LYMPHOCYTES RELATIVE PERCENT: 20.4 %
LYMPHOCYTES RELATIVE PERCENT: 29.1 %
Lab: NORMAL
MCH RBC QN AUTO: 32 PG (ref 27–31.3)
MCH RBC QN AUTO: 33.3 PG (ref 27–31.3)
MCH RBC QN AUTO: 33.5 PG (ref 27–31.3)
MCHC RBC AUTO-ENTMCNC: 32.8 % (ref 33–37)
MCHC RBC AUTO-ENTMCNC: 33.2 % (ref 33–37)
MCHC RBC AUTO-ENTMCNC: 33.5 % (ref 33–37)
MCV RBC AUTO: 100.9 FL (ref 80–100)
MCV RBC AUTO: 97.5 FL (ref 80–100)
MCV RBC AUTO: 99.4 FL (ref 80–100)
METHADONE SCREEN, URINE: NORMAL
MONOCYTES ABSOLUTE: 0.6 K/UL (ref 0.2–0.8)
MONOCYTES ABSOLUTE: 0.8 K/UL (ref 0.2–0.8)
MONOCYTES RELATIVE PERCENT: 13.8 %
MONOCYTES RELATIVE PERCENT: 7.3 %
NEUTROPHILS ABSOLUTE: 2.8 K/UL (ref 1.4–6.5)
NEUTROPHILS ABSOLUTE: 6 K/UL (ref 1.4–6.5)
NEUTROPHILS RELATIVE PERCENT: 50 %
NEUTROPHILS RELATIVE PERCENT: 70.7 %
NITRITE, URINE: NEGATIVE
OPIATE SCREEN URINE: NORMAL
ORGANISM: ABNORMAL
PDW BLD-RTO: 14.5 % (ref 11.5–14.5)
PDW BLD-RTO: 15.4 % (ref 11.5–14.5)
PDW BLD-RTO: 16.3 % (ref 11.5–14.5)
PH UA: 5.5 (ref 5–9)
PHENCYCLIDINE SCREEN URINE: NORMAL
PLATELET # BLD: 158 K/UL (ref 130–400)
PLATELET # BLD: 158 K/UL (ref 130–400)
PLATELET # BLD: 209 K/UL (ref 130–400)
POTASSIUM SERPL-SCNC: 4.1 MEQ/L (ref 3.5–5.1)
POTASSIUM SERPL-SCNC: 4.5 MEQ/L (ref 3.5–5.1)
POTASSIUM SERPL-SCNC: 5.9 MEQ/L (ref 3.5–5.1)
PROTEIN UA: NEGATIVE MG/DL
PROTHROMBIN TIME: 11.4 SEC (ref 9.6–12.3)
RBC # BLD: 3.26 M/UL (ref 4.7–6.1)
RBC # BLD: 3.47 M/UL (ref 4.7–6.1)
RBC # BLD: 3.77 M/UL (ref 4.7–6.1)
RBC UA: ABNORMAL /HPF (ref 0–2)
SODIUM BLD-SCNC: 139 MEQ/L (ref 132–144)
SODIUM BLD-SCNC: 140 MEQ/L (ref 132–144)
SODIUM BLD-SCNC: 143 MEQ/L (ref 132–144)
SPECIFIC GRAVITY UA: 1 (ref 1–1.03)
TOTAL CK: 30 U/L (ref 0–190)
TOTAL PROTEIN: 5.9 G/DL (ref 6.4–8.1)
TOTAL PROTEIN: 6.5 G/DL (ref 6.4–8.1)
TRICYCLIC, URINE: NORMAL
TSH SERPL DL<=0.05 MIU/L-ACNC: 1.41 UIU/ML (ref 0.27–4.2)
URINE CULTURE, ROUTINE: ABNORMAL
URINE CULTURE, ROUTINE: ABNORMAL
URINE REFLEX TO CULTURE: YES
UROBILINOGEN, URINE: 2 E.U./DL
WBC # BLD: 5.7 K/UL (ref 4.8–10.8)
WBC # BLD: 7 K/UL (ref 4.8–10.8)
WBC # BLD: 8.6 K/UL (ref 4.8–10.8)
WBC UA: ABNORMAL /HPF (ref 0–5)

## 2018-01-01 PROCEDURE — 1036F TOBACCO NON-USER: CPT | Performed by: INTERNAL MEDICINE

## 2018-01-01 PROCEDURE — 99308 SBSQ NF CARE LOW MDM 20: CPT | Performed by: NURSE PRACTITIONER

## 2018-01-01 PROCEDURE — 99213 OFFICE O/P EST LOW 20 MIN: CPT | Performed by: INTERNAL MEDICINE

## 2018-01-01 PROCEDURE — 99309 SBSQ NF CARE MODERATE MDM 30: CPT | Performed by: NURSE PRACTITIONER

## 2018-01-01 PROCEDURE — 85025 COMPLETE CBC W/AUTO DIFF WBC: CPT

## 2018-01-01 PROCEDURE — 6360000002 HC RX W HCPCS: Performed by: STUDENT IN AN ORGANIZED HEALTH CARE EDUCATION/TRAINING PROGRAM

## 2018-01-01 PROCEDURE — 96365 THER/PROPH/DIAG IV INF INIT: CPT

## 2018-01-01 PROCEDURE — 99308 SBSQ NF CARE LOW MDM 20: CPT | Performed by: INTERNAL MEDICINE

## 2018-01-01 PROCEDURE — 96374 THER/PROPH/DIAG INJ IV PUSH: CPT

## 2018-01-01 PROCEDURE — 1123F ACP DISCUSS/DSCN MKR DOCD: CPT | Performed by: NURSE PRACTITIONER

## 2018-01-01 PROCEDURE — 87086 URINE CULTURE/COLONY COUNT: CPT

## 2018-01-01 PROCEDURE — 6360000002 HC RX W HCPCS: Performed by: INTERNAL MEDICINE

## 2018-01-01 PROCEDURE — 87186 SC STD MICRODIL/AGAR DIL: CPT

## 2018-01-01 PROCEDURE — C9113 INJ PANTOPRAZOLE SODIUM, VIA: HCPCS | Performed by: INTERNAL MEDICINE

## 2018-01-01 PROCEDURE — 80307 DRUG TEST PRSMV CHEM ANLYZR: CPT

## 2018-01-01 PROCEDURE — 85014 HEMATOCRIT: CPT

## 2018-01-01 PROCEDURE — 1123F ACP DISCUSS/DSCN MKR DOCD: CPT | Performed by: INTERNAL MEDICINE

## 2018-01-01 PROCEDURE — 6360000002 HC RX W HCPCS: Performed by: EMERGENCY MEDICINE

## 2018-01-01 PROCEDURE — 99285 EMERGENCY DEPT VISIT HI MDM: CPT

## 2018-01-01 PROCEDURE — 6370000000 HC RX 637 (ALT 250 FOR IP): Performed by: INTERNAL MEDICINE

## 2018-01-01 PROCEDURE — 2580000003 HC RX 258: Performed by: EMERGENCY MEDICINE

## 2018-01-01 PROCEDURE — 2580000003 HC RX 258: Performed by: INTERNAL MEDICINE

## 2018-01-01 PROCEDURE — 93005 ELECTROCARDIOGRAM TRACING: CPT

## 2018-01-01 PROCEDURE — G8484 FLU IMMUNIZE NO ADMIN: HCPCS | Performed by: INTERNAL MEDICINE

## 2018-01-01 PROCEDURE — 70450 CT HEAD/BRAIN W/O DYE: CPT

## 2018-01-01 PROCEDURE — 84443 ASSAY THYROID STIM HORMONE: CPT

## 2018-01-01 PROCEDURE — G8427 DOCREV CUR MEDS BY ELIG CLIN: HCPCS | Performed by: INTERNAL MEDICINE

## 2018-01-01 PROCEDURE — 85730 THROMBOPLASTIN TIME PARTIAL: CPT

## 2018-01-01 PROCEDURE — 80053 COMPREHEN METABOLIC PANEL: CPT

## 2018-01-01 PROCEDURE — G8598 ASA/ANTIPLAT THER USED: HCPCS | Performed by: INTERNAL MEDICINE

## 2018-01-01 PROCEDURE — 36415 COLL VENOUS BLD VENIPUNCTURE: CPT

## 2018-01-01 PROCEDURE — 87077 CULTURE AEROBIC IDENTIFY: CPT

## 2018-01-01 PROCEDURE — 2580000003 HC RX 258: Performed by: STUDENT IN AN ORGANIZED HEALTH CARE EDUCATION/TRAINING PROGRAM

## 2018-01-01 PROCEDURE — 85018 HEMOGLOBIN: CPT

## 2018-01-01 PROCEDURE — G8420 CALC BMI NORM PARAMETERS: HCPCS | Performed by: INTERNAL MEDICINE

## 2018-01-01 PROCEDURE — 1210000000 HC MED SURG R&B

## 2018-01-01 PROCEDURE — 93010 ELECTROCARDIOGRAM REPORT: CPT | Performed by: INTERNAL MEDICINE

## 2018-01-01 PROCEDURE — 4040F PNEUMOC VAC/ADMIN/RCVD: CPT | Performed by: INTERNAL MEDICINE

## 2018-01-01 PROCEDURE — 99310 SBSQ NF CARE HIGH MDM 45: CPT | Performed by: NURSE PRACTITIONER

## 2018-01-01 PROCEDURE — 82550 ASSAY OF CK (CPK): CPT

## 2018-01-01 PROCEDURE — C9113 INJ PANTOPRAZOLE SODIUM, VIA: HCPCS | Performed by: STUDENT IN AN ORGANIZED HEALTH CARE EDUCATION/TRAINING PROGRAM

## 2018-01-01 PROCEDURE — G0480 DRUG TEST DEF 1-7 CLASSES: HCPCS

## 2018-01-01 PROCEDURE — 82140 ASSAY OF AMMONIA: CPT

## 2018-01-01 PROCEDURE — 81001 URINALYSIS AUTO W/SCOPE: CPT

## 2018-01-01 PROCEDURE — G8484 FLU IMMUNIZE NO ADMIN: HCPCS | Performed by: NURSE PRACTITIONER

## 2018-01-01 PROCEDURE — 96375 TX/PRO/DX INJ NEW DRUG ADDON: CPT

## 2018-01-01 PROCEDURE — 85610 PROTHROMBIN TIME: CPT

## 2018-01-01 PROCEDURE — 96372 THER/PROPH/DIAG INJ SC/IM: CPT

## 2018-01-01 PROCEDURE — 6360000002 HC RX W HCPCS: Performed by: PHYSICIAN ASSISTANT

## 2018-01-01 RX ORDER — TRAZODONE HYDROCHLORIDE 50 MG/1
50 TABLET ORAL EVERY 6 HOURS PRN
Status: ON HOLD | COMMUNITY
End: 2018-01-01 | Stop reason: HOSPADM

## 2018-01-01 RX ORDER — SULFAMETHOXAZOLE AND TRIMETHOPRIM 800; 160 MG/1; MG/1
1 TABLET ORAL 2 TIMES DAILY
Qty: 14 TABLET | Refills: 0 | Status: SHIPPED | OUTPATIENT
Start: 2018-01-01 | End: 2018-01-01

## 2018-01-01 RX ORDER — HYDROMORPHONE HYDROCHLORIDE 2 MG/1
1 TABLET ORAL ONCE
Status: COMPLETED | OUTPATIENT
Start: 2018-01-01 | End: 2018-01-01

## 2018-01-01 RX ORDER — HALOPERIDOL 5 MG/ML
5 INJECTION INTRAMUSCULAR ONCE
Status: COMPLETED | OUTPATIENT
Start: 2018-01-01 | End: 2018-01-01

## 2018-01-01 RX ORDER — SODIUM CHLORIDE 0.9 % (FLUSH) 0.9 %
10 SYRINGE (ML) INJECTION EVERY 12 HOURS SCHEDULED
Status: DISCONTINUED | OUTPATIENT
Start: 2018-01-01 | End: 2018-01-01 | Stop reason: HOSPADM

## 2018-01-01 RX ORDER — METHADONE HYDROCHLORIDE 5 MG/1
5 TABLET ORAL 2 TIMES DAILY
Qty: 8 TABLET | Refills: 0 | Status: SHIPPED | OUTPATIENT
Start: 2018-01-01 | End: 2018-01-01

## 2018-01-01 RX ORDER — MORPHINE SULFATE 100 MG/5ML
5 SOLUTION ORAL
Status: CANCELLED | OUTPATIENT
Start: 2018-01-01

## 2018-01-01 RX ORDER — LORAZEPAM 1 MG/1
1 TABLET ORAL EVERY 6 HOURS PRN
Qty: 20 TABLET | Refills: 0 | Status: SHIPPED | OUTPATIENT
Start: 2018-01-01 | End: 2018-01-01 | Stop reason: SDUPTHER

## 2018-01-01 RX ORDER — DOCUSATE SODIUM 100 MG/1
100 CAPSULE, LIQUID FILLED ORAL 2 TIMES DAILY
Status: ON HOLD | COMMUNITY
End: 2018-01-01 | Stop reason: HOSPADM

## 2018-01-01 RX ORDER — LORAZEPAM 2 MG/ML
0.5 INJECTION INTRAMUSCULAR EVERY 4 HOURS PRN
Status: DISCONTINUED | OUTPATIENT
Start: 2018-01-01 | End: 2018-01-01 | Stop reason: HOSPADM

## 2018-01-01 RX ORDER — 0.9 % SODIUM CHLORIDE 0.9 %
10 VIAL (ML) INJECTION DAILY
Status: DISCONTINUED | OUTPATIENT
Start: 2018-01-01 | End: 2018-01-01

## 2018-01-01 RX ORDER — METHADONE HYDROCHLORIDE 5 MG/1
5 TABLET ORAL 2 TIMES DAILY
Qty: 60 TABLET | Refills: 0 | Status: SHIPPED | OUTPATIENT
Start: 2018-01-01 | End: 2018-01-01

## 2018-01-01 RX ORDER — MORPHINE SULFATE 2 MG/ML
2 INJECTION, SOLUTION INTRAMUSCULAR; INTRAVENOUS
Status: CANCELLED | OUTPATIENT
Start: 2018-01-01

## 2018-01-01 RX ORDER — MIRTAZAPINE 15 MG/1
15 TABLET, FILM COATED ORAL NIGHTLY
Qty: 30 TABLET | Refills: 3 | Status: ON HOLD | OUTPATIENT
Start: 2018-01-01 | End: 2018-01-01 | Stop reason: ALTCHOICE

## 2018-01-01 RX ORDER — ACETAMINOPHEN 80 MG
TABLET,CHEWABLE ORAL ONCE
Status: DISCONTINUED | OUTPATIENT
Start: 2018-01-01 | End: 2018-01-01 | Stop reason: HOSPADM

## 2018-01-01 RX ORDER — SODIUM CHLORIDE 9 MG/ML
INJECTION, SOLUTION INTRAVENOUS CONTINUOUS
Status: DISCONTINUED | OUTPATIENT
Start: 2018-01-01 | End: 2018-01-01 | Stop reason: HOSPADM

## 2018-01-01 RX ORDER — ONDANSETRON 2 MG/ML
4 INJECTION INTRAMUSCULAR; INTRAVENOUS EVERY 6 HOURS PRN
Status: DISCONTINUED | OUTPATIENT
Start: 2018-01-01 | End: 2018-01-01 | Stop reason: HOSPADM

## 2018-01-01 RX ORDER — 0.9 % SODIUM CHLORIDE 0.9 %
1000 INTRAVENOUS SOLUTION INTRAVENOUS ONCE
Status: COMPLETED | OUTPATIENT
Start: 2018-01-01 | End: 2018-01-01

## 2018-01-01 RX ORDER — METHADONE HYDROCHLORIDE 5 MG/1
5 TABLET ORAL 2 TIMES DAILY
Qty: 10 TABLET | Refills: 0 | Status: SHIPPED | OUTPATIENT
Start: 2018-01-01 | End: 2018-01-01

## 2018-01-01 RX ORDER — PANTOPRAZOLE SODIUM 40 MG/10ML
40 INJECTION, POWDER, LYOPHILIZED, FOR SOLUTION INTRAVENOUS EVERY 12 HOURS
Status: DISCONTINUED | OUTPATIENT
Start: 2018-01-01 | End: 2018-01-01 | Stop reason: HOSPADM

## 2018-01-01 RX ORDER — LORAZEPAM 2 MG/ML
2 INJECTION INTRAMUSCULAR ONCE
Status: COMPLETED | OUTPATIENT
Start: 2018-01-01 | End: 2018-01-01

## 2018-01-01 RX ORDER — DIVALPROEX SODIUM 125 MG/1
250 CAPSULE, COATED PELLETS ORAL 3 TIMES DAILY
Status: ON HOLD | COMMUNITY
End: 2018-01-01 | Stop reason: HOSPADM

## 2018-01-01 RX ORDER — ACETAMINOPHEN 325 MG/1
650 TABLET ORAL EVERY 4 HOURS PRN
Status: DISCONTINUED | OUTPATIENT
Start: 2018-01-01 | End: 2018-01-01 | Stop reason: HOSPADM

## 2018-01-01 RX ORDER — ONDANSETRON 4 MG/1
4 TABLET, ORALLY DISINTEGRATING ORAL EVERY 6 HOURS PRN
Status: CANCELLED | OUTPATIENT
Start: 2018-01-01

## 2018-01-01 RX ORDER — METHADONE HYDROCHLORIDE 5 MG/1
5 TABLET ORAL EVERY 12 HOURS
Qty: 60 TABLET | Refills: 0 | Status: SHIPPED | OUTPATIENT
Start: 2018-01-01 | End: 2018-01-01

## 2018-01-01 RX ORDER — HYDROMORPHONE HYDROCHLORIDE 2 MG/1
1 TABLET ORAL EVERY 6 HOURS PRN
Status: CANCELLED | OUTPATIENT
Start: 2018-01-01

## 2018-01-01 RX ORDER — LANOLIN ALCOHOL/MO/W.PET/CERES
3 CREAM (GRAM) TOPICAL DAILY
COMMUNITY

## 2018-01-01 RX ORDER — PANTOPRAZOLE SODIUM 40 MG/10ML
80 INJECTION, POWDER, LYOPHILIZED, FOR SOLUTION INTRAVENOUS DAILY
Status: DISCONTINUED | OUTPATIENT
Start: 2018-01-01 | End: 2018-01-01

## 2018-01-01 RX ORDER — LORAZEPAM 2 MG/ML
1 CONCENTRATE ORAL
Status: CANCELLED | OUTPATIENT
Start: 2018-01-01

## 2018-01-01 RX ORDER — QUETIAPINE FUMARATE 25 MG/1
50 TABLET, FILM COATED ORAL 3 TIMES DAILY
COMMUNITY

## 2018-01-01 RX ORDER — LORAZEPAM 2 MG/ML
1 INJECTION INTRAMUSCULAR ONCE
Status: COMPLETED | OUTPATIENT
Start: 2018-01-01 | End: 2018-01-01

## 2018-01-01 RX ORDER — LORAZEPAM 2 MG/ML
2 INJECTION INTRAMUSCULAR ONCE
Status: DISCONTINUED | OUTPATIENT
Start: 2018-01-01 | End: 2018-01-01

## 2018-01-01 RX ORDER — 0.9 % SODIUM CHLORIDE 0.9 %
10 VIAL (ML) INJECTION EVERY 12 HOURS
Status: DISCONTINUED | OUTPATIENT
Start: 2018-01-01 | End: 2018-01-01 | Stop reason: HOSPADM

## 2018-01-01 RX ORDER — ONDANSETRON 2 MG/ML
4 INJECTION INTRAMUSCULAR; INTRAVENOUS ONCE
Status: COMPLETED | OUTPATIENT
Start: 2018-01-01 | End: 2018-01-01

## 2018-01-01 RX ORDER — LORAZEPAM 1 MG/1
1 TABLET ORAL EVERY 6 HOURS PRN
Qty: 20 TABLET | Refills: 0 | Status: SHIPPED | OUTPATIENT
Start: 2018-01-01 | End: 2018-06-01

## 2018-01-01 RX ORDER — SODIUM CHLORIDE 0.9 % (FLUSH) 0.9 %
10 SYRINGE (ML) INJECTION PRN
Status: DISCONTINUED | OUTPATIENT
Start: 2018-01-01 | End: 2018-01-01 | Stop reason: HOSPADM

## 2018-01-01 RX ORDER — TRAZODONE HYDROCHLORIDE 50 MG/1
50 TABLET ORAL NIGHTLY PRN
Status: ON HOLD | COMMUNITY
End: 2018-01-01 | Stop reason: HOSPADM

## 2018-01-01 RX ORDER — SODIUM CHLORIDE 9 MG/ML
INJECTION, SOLUTION INTRAVENOUS
Status: DISCONTINUED
Start: 2018-01-01 | End: 2018-01-01

## 2018-01-01 RX ORDER — MIRTAZAPINE 15 MG/1
7.5 TABLET, FILM COATED ORAL NIGHTLY
Qty: 15 TABLET | Refills: 0 | Status: SHIPPED | OUTPATIENT
Start: 2018-01-01 | End: 2018-01-01 | Stop reason: ALTCHOICE

## 2018-01-01 RX ORDER — METHADONE HYDROCHLORIDE 5 MG/1
5 TABLET ORAL 2 TIMES DAILY
Qty: 14 TABLET | Refills: 0 | Status: SHIPPED | OUTPATIENT
Start: 2018-01-01 | End: 2018-01-01

## 2018-01-01 RX ADMIN — ONDANSETRON 4 MG: 2 INJECTION INTRAMUSCULAR; INTRAVENOUS at 14:23

## 2018-01-01 RX ADMIN — PANTOPRAZOLE SODIUM 40 MG: 40 INJECTION, POWDER, FOR SOLUTION INTRAVENOUS at 04:43

## 2018-01-01 RX ADMIN — Medication 20 ML: at 14:26

## 2018-01-01 RX ADMIN — HALOPERIDOL LACTATE 5 MG: 5 INJECTION, SOLUTION INTRAMUSCULAR at 02:10

## 2018-01-01 RX ADMIN — LORAZEPAM 0.5 MG: 2 INJECTION INTRAMUSCULAR; INTRAVENOUS at 21:52

## 2018-01-01 RX ADMIN — SODIUM CHLORIDE: 9 INJECTION, SOLUTION INTRAVENOUS at 23:08

## 2018-01-01 RX ADMIN — SODIUM CHLORIDE 1000 ML: 9 INJECTION, SOLUTION INTRAVENOUS at 14:12

## 2018-01-01 RX ADMIN — Medication 10 ML: at 21:55

## 2018-01-01 RX ADMIN — SODIUM CHLORIDE: 9 INJECTION, SOLUTION INTRAVENOUS at 06:12

## 2018-01-01 RX ADMIN — Medication 10 ML: at 21:50

## 2018-01-01 RX ADMIN — SODIUM CHLORIDE 1000 ML: 9 INJECTION, SOLUTION INTRAVENOUS at 14:07

## 2018-01-01 RX ADMIN — LORAZEPAM 1 MG: 2 INJECTION INTRAMUSCULAR; INTRAVENOUS at 00:00

## 2018-01-01 RX ADMIN — LORAZEPAM 0.5 MG: 2 INJECTION INTRAMUSCULAR; INTRAVENOUS at 21:15

## 2018-01-01 RX ADMIN — PANTOPRAZOLE SODIUM 40 MG: 40 INJECTION, POWDER, FOR SOLUTION INTRAVENOUS at 17:47

## 2018-01-01 RX ADMIN — LORAZEPAM 2 MG: 2 INJECTION INTRAMUSCULAR; INTRAVENOUS at 02:26

## 2018-01-01 RX ADMIN — PANTOPRAZOLE SODIUM 80 MG: 40 INJECTION, POWDER, FOR SOLUTION INTRAVENOUS at 14:23

## 2018-01-01 RX ADMIN — SODIUM CHLORIDE: 9 INJECTION, SOLUTION INTRAVENOUS at 19:52

## 2018-01-01 RX ADMIN — CEFTRIAXONE SODIUM 1 G: 1 INJECTION, POWDER, FOR SOLUTION INTRAMUSCULAR; INTRAVENOUS at 06:49

## 2018-01-01 RX ADMIN — Medication 10 ML: at 17:49

## 2018-01-01 RX ADMIN — Medication 10 ML: at 19:55

## 2018-01-01 RX ADMIN — HYDROMORPHONE HYDROCHLORIDE 1 MG: 2 TABLET ORAL at 19:51

## 2018-01-01 RX ADMIN — Medication 10 ML: at 04:43

## 2018-01-01 ASSESSMENT — ENCOUNTER SYMPTOMS
SHORTNESS OF BREATH: 0
COLOR CHANGE: 0
STRIDOR: 0
BACK PAIN: 1
DIARRHEA: 0
BACK PAIN: 0
ANAL BLEEDING: 0
VOICE CHANGE: 0
COLOR CHANGE: 0
RECTAL PAIN: 0
SORE THROAT: 0
RECTAL PAIN: 0
SHORTNESS OF BREATH: 0
ANAL BLEEDING: 0
SINUS PRESSURE: 0
CHOKING: 0
APNEA: 0
VOMITING: 1
VOICE CHANGE: 0
CHEST TIGHTNESS: 0
SHORTNESS OF BREATH: 0
CHEST TIGHTNESS: 0
ABDOMINAL PAIN: 0
BACK PAIN: 0
ANAL BLEEDING: 0
CONSTIPATION: 0
ABDOMINAL PAIN: 0
NAUSEA: 0
SHORTNESS OF BREATH: 0
VOMITING: 0
SHORTNESS OF BREATH: 0
VOICE CHANGE: 0
VOICE CHANGE: 0
WHEEZING: 0
APNEA: 0
CHEST TIGHTNESS: 0
WHEEZING: 0
ABDOMINAL DISTENTION: 0
TROUBLE SWALLOWING: 0
TROUBLE SWALLOWING: 0
WHEEZING: 0
ANAL BLEEDING: 0
COUGH: 0
CONSTIPATION: 0
TROUBLE SWALLOWING: 0
APNEA: 0
SORE THROAT: 0
BACK PAIN: 1
STRIDOR: 0
EYE DISCHARGE: 0
ABDOMINAL DISTENTION: 0
SORE THROAT: 0
CONSTIPATION: 0
DIARRHEA: 0
STRIDOR: 0
STRIDOR: 0
WHEEZING: 0
DIARRHEA: 0
COUGH: 0
CONSTIPATION: 0
VOMITING: 0
CHOKING: 0
NAUSEA: 1
CHEST TIGHTNESS: 0
BLOOD IN STOOL: 0
COUGH: 0
COLOR CHANGE: 0
TROUBLE SWALLOWING: 0
COUGH: 0
ABDOMINAL PAIN: 1
CHOKING: 0
VOMITING: 0
VOMITING: 0
BLOOD IN STOOL: 1
ABDOMINAL PAIN: 0
ABDOMINAL DISTENTION: 0
RECTAL PAIN: 0
BLOOD IN STOOL: 0
BLOOD IN STOOL: 0
APNEA: 0
CHEST TIGHTNESS: 0
ABDOMINAL DISTENTION: 0
NAUSEA: 0
COUGH: 0
CHOKING: 0
EYE DISCHARGE: 0
DIARRHEA: 1
BLOOD IN STOOL: 0
NAUSEA: 0
DIARRHEA: 0
BACK PAIN: 0
SORE THROAT: 0
COLOR CHANGE: 0
RECTAL PAIN: 0
NAUSEA: 0
EYE DISCHARGE: 0
EYE DISCHARGE: 0
ABDOMINAL PAIN: 0
TROUBLE SWALLOWING: 0

## 2018-01-01 ASSESSMENT — PAIN SCALES - GENERAL
PAINLEVEL_OUTOF10: 0
PAINLEVEL_OUTOF10: 2
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
## 2018-01-29 NOTE — TELEPHONE ENCOUNTER
Initial consult for Palliative Care scheduled for Thur 2/15 at 12p at [facility]. Taylor Soler will be present for visit. Info sent in mail.

## 2018-02-15 NOTE — PATIENT INSTRUCTIONS
as:  ¨ Wandering or getting lost in places you know well. ¨ Losing bladder or bowel control. ¨ Having trouble following instructions. ¨ Having problems handling money. ? Watch closely for changes in your health, and be sure to contact your doctor if:  ? · You need help to arrange care. Where can you learn more? Go to https://chpepiceweb.[employer]. org and sign in to your [employer] account. Enter [id number] in the Instant API box to learn more about \"Multi-Infarct Dementia: Care Instructions. \"     If you do not have an account, please click on the \"Sign Up Now\" link. Current as of: May 12, 2017  Content Version: 11.5  © 3060-9060 [employer]wise, Incorporated. Care instructions adapted under license by [facility] ([facility]). If you have questions about a medical condition or this instruction, always ask your healthcare professional. [address] any warranty or liability for your use of this information.

## 2018-02-15 NOTE — PROGRESS NOTES
Subjective:      Patient ID: Gonzalo Shukla is a 80 y.o. male.     HPI    Review of Systems    Objective:   Physical Exam    Assessment:            Plan:
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
## 2018-02-22 NOTE — TELEPHONE ENCOUNTER
Pls call Lori back at [facility]. She is requesting verification that pt needs new RX for pain meds.

## 2018-02-23 NOTE — TELEPHONE ENCOUNTER
Called aris who told me Julius Form a script for Methadone as he only has a few days left. [facility] has changed to [employer]   I will stop by [facility] Kline and write an order as I cant seem to find or enter Alixa into epic

## 2018-03-02 NOTE — PROGRESS NOTES
Chief Complaint   Patient presents with    Atrial Fibrillation    3 Month Follow-Up       10-27-17: Patient presents for initial medical evaluation. Patient is followed on a regular basis by Dr. Chloé Rodriguez MD. Presents from [address] [place] [address], Dr. Marisol De Leon for right foot/heal ulcer that is not healing well. S/p b/l LE art dupplex US with severe b/l disease, right NICOLE is 0.4, left is 0.6. Patient is  Not talkative right now, but does talks normally at nursing home per his DPOA/GD/Consuelo. He had a CVA in 9/2017, also has hx of CVA in 2015 with right sided weakness in right UE. His functional capacity and quality of life has declined since 9/2017. Compliant with meds through feeding tube. Patient is [facility]. Per his GD, she would like to have everything done to heal the wound including undergoing a LE procedure. Follows up with dr. Alessandro Tsang. He does have a hx of Afibb, not on any OAC at this time. lavs reviewed. 12-1-1: states right foot/heal ulcer is doing better. He is in physical therapy now. Has been doing some walking with walker or full assist. States his left LE is ok. Compliant with meds. No bleeding issues. Living at [address] [place] [address] now. GD present. Pt denies chest pain, dyspnea, dyspnea on exertion, change in exercise capacity, fatigue,  nausea, vomiting, diarrhea, constipation, motor weakness, insomnia, weight loss, syncope, dizziness, lightheadedness, palpitations, PND, orthopnea. No nitro use. BP and hr are good. No LE discoloration or ulcers. No LE edema. No CHF type symptoms. Lipid profile is normal.     ASSESSMENT:  1.  Status post successful right distal SFA popliteal artery PTA and  stenting with Supera stents with 0% residual stenosis. 2.  Status post successful right anterior tibial artery and TP trunk PTA  kissing balloon fashion with 0% residual stenosis. 3.  Severe left SFA disease as described above. 4.  100% left anterior tibial artery stenosis.   5.  100% left profunda coloration and turgor, no rashes, no suspicious skin lesions noted      No orders of the defined types were placed in this encounter. ASSESSMENT:    1. Atrial fibrillation, unspecified type ([address]  CANCELED: EKG 12 Lead   2. Dyslipidemia     3. Mixed hyperlipidemia     4. Pacemaker           PLAN:     Patient will need to continue to follow up with you for their general medical care     As always, aggressive risk factor modification is strongly recommended. We should adhere to the [address] guidelines for HTN management and the NCEP ATP III guidelines for LDL-C management. Cardiac diet is always recommended with low fat, cholesterol, calories and sodium. Continue medications at current doses. ASA/PLAVIX, statin    See me PRN. If needed by podiatry and dr Juan Nails. Patient was advised and encouraged to check blood pressure at home or at a pharmacy, maintain a logbook, and also call us back if blood pressure are above the target ranges or if it is low. Patient clearly understands and agrees to the instructions. We will need to continue to monitor muscle and liver enzymes, BUN, CR, and electrolytes.

## 2018-03-20 NOTE — PROGRESS NOTES
Hematological: Negative for adenopathy. Does not bruise/bleed easily. Psychiatric/Behavioral: Negative for agitation, behavioral problems, confusion, decreased concentration, dysphoric mood, hallucinations, self-injury, sleep disturbance and suicidal ideas. The patient is not nervous/anxious and is not hyperactive. Objective:   /64   Pulse 70   Resp 16   SpO2 96%     Physical Exam   Constitutional: He appears well-developed and well-nourished. No distress. HENT:   Head: Normocephalic and atraumatic. Right Ear: Hearing and external ear normal.   Left Ear: Hearing and external ear normal.   Nose: Nose normal.   Mouth/Throat: Uvula is midline, oropharynx is clear and moist and mucous membranes are normal. No oropharyngeal exudate. Eyes: Conjunctivae and lids are normal. Pupils are equal, round, and reactive to light. Right eye exhibits no discharge. Left eye exhibits no discharge. No scleral icterus. Neck: Normal range of motion. Neck supple. No JVD present. No tracheal deviation present. No thyromegaly present. Cardiovascular: Normal rate, regular rhythm and normal heart sounds. Pulmonary/Chest: Effort normal. No stridor. No respiratory distress. He has decreased breath sounds. He has no wheezes. He has no rales. He exhibits no tenderness. Abdominal: Soft. Bowel sounds are normal. He exhibits no distension and no mass. There is no tenderness. There is no rebound and no guarding. Musculoskeletal: Normal range of motion. He exhibits no edema, tenderness or deformity. Lymphadenopathy:     He has no cervical adenopathy. Neurological: He is alert. No cranial nerve deficit or sensory deficit. Coordination normal.   Skin: Skin is warm and dry. No rash noted. He is not diaphoretic. No erythema. Psychiatric: He has a normal mood and affect. His behavior is normal. Judgment and thought content normal. His speech is tangential. Cognition and memory are impaired.

## 2018-05-12 PROBLEM — K92.2 ACUTE GI BLEEDING: Status: ACTIVE | Noted: 2018-01-01

## 2018-05-12 PROBLEM — E11.65 HYPERGLYCEMIA DUE TO TYPE 2 DIABETES MELLITUS (HCC): Chronic | Status: ACTIVE | Noted: 2018-01-01

## 2018-05-12 PROBLEM — N19 ACUTE PRERENAL AZOTEMIA: Status: ACTIVE | Noted: 2018-01-01

## 2018-05-12 PROBLEM — Z79.02 LONG TERM (CURRENT) USE OF ANTITHROMBOTICS/ANTIPLATELETS: Status: ACTIVE | Noted: 2018-01-01

## 2018-05-12 PROBLEM — D68.32 HEMORRHAGIC DISORDER DUE TO EXTRINSIC CIRCULATING ANTICOAGULANTS (HCC): Status: ACTIVE | Noted: 2018-01-01

## 2018-05-12 PROBLEM — D62 ACUTE BLOOD LOSS ANEMIA: Status: ACTIVE | Noted: 2018-01-01

## 2022-03-18 NOTE — PROGRESS NOTES
Information/update noted   Pharmacy Consult    Eddie Santoyo is a 80 y.o. male for whom pharmacy has been consulted to change medications to IV or transdermal due to patient being NPO at this time. Patient Active Problem List   Diagnosis    Hypertension    Dyslipidemia    Type 2 diabetes mellitus ([address]    Diabetic neuropathy ([facility]    Vitamin D deficiency    Osteoarthritis    CVA (cerebrovascular accident) ([address]    Atrial fibrillation ([address]    Hemiparesis, left ([address]    TBI (traumatic brain injury) ([facility]    Traumatic Lt superior frontal lobe hematoma    Abnormality of gait and mobility due to TBI Lt superior frontal lobe hematoma, [facility] admit 09/07/17    Aphasia    BPH (benign prostatic hypertrophy)    Chronic back pain    Hyperlipidemia    Pacemaker    Intraparenchymal hemorrhage of brain (HCC)    Lobar cerebral hemorrhage (HCC)    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 Recent Labs      09/08/17   0717   CREATININE  0.86       Ht/Wt:   Ht Readings from Last 1 Encounters:   09/07/17 5' 7\" (1.702 m)      Wt Readings from Last 1 Encounters:   09/07/17 180 lb (81.6 kg)       Estimated Creatinine Clearance: 62 mL/min (based on Cr of 0.86). Assessment/Plan:  Pharmacy changed Pepcid to IV. Pharmacy changed Protonix to IV. Lopressor may be changed by a Practitioner due to the fact that there is not an auto sub available. Typical Lopressor IV does are 5 mg IV every 6 hours. All other meds at this time, do not have IV substitutions available. Thank you for the consult. Will continue to follow.     [facility]  Staff Pharmacist  9/8/2017  4:27 PM

## 2024-12-07 NOTE — PROGRESS NOTES
Provided patient's mom with discharge education focusing on activity, diet, worsening signs/symptoms, taking medications, and keeping follow-up appointment. Patient verbalized understanding and denies further questions at this time.      asymmetry, speech difficulty, weakness, light-headedness, numbness and headaches. Hematological: Negative for adenopathy. Bruises/bleeds easily. Psychiatric/Behavioral: Negative for agitation, behavioral problems, confusion, decreased concentration, dysphoric mood, hallucinations, self-injury, sleep disturbance and suicidal ideas. The patient is not nervous/anxious and is not hyperactive. Objective: There were no vitals taken for this visit. Physical Exam   Constitutional: He is oriented to person, place, and time. He appears well-developed and well-nourished. No distress. HENT:   Head: Normocephalic and atraumatic. Right Ear: External ear normal.   Left Ear: External ear normal.   Nose: Nose normal.   Mouth/Throat: Oropharynx is clear and moist. No oropharyngeal exudate. Eyes: Conjunctivae and EOM are normal. Pupils are equal, round, and reactive to light. Right eye exhibits no discharge. Left eye exhibits no discharge. No scleral icterus. Neck: Normal range of motion. Neck supple. No JVD present. No tracheal deviation present. No thyromegaly present. Cardiovascular: Normal rate and regular rhythm. Pulmonary/Chest: Effort normal. No stridor. No respiratory distress. He has decreased breath sounds. He has no wheezes. He has no rales. He exhibits no tenderness. Abdominal: Soft. Bowel sounds are normal. He exhibits no distension and no mass. There is no tenderness. There is no rebound and no guarding. Musculoskeletal: Normal range of motion. He exhibits no edema, tenderness or deformity. Lymphadenopathy:     He has no cervical adenopathy. Neurological: He is alert and oriented to person, place, and time. No cranial nerve deficit. Coordination normal.   Skin: Skin is warm and dry. Laceration noted. No rash noted. He is not diaphoretic. No erythema. Skin tear, bleeding controlled   Psychiatric: His behavior is normal. Judgment and thought content normal. His affect is blunt.

## 2025-07-06 NOTE — PROGRESS NOTES
[facility]   Acute  Rehabilitation  RECREATIONAL THERAPY    Recreation Therapy Note    Date:  9/8/2017        Patient Name: Ignacio Meneses       MRN: [id number]        YOB: 1929 (80 y.o.)       Gender: male  Diagnosis: TBI- left superior frontal lobe hematoma s/p fall with impaired mobility   Referring Practitioner: Dr. Eubanks Fueldenisse     RESTRICTIONS/PRECAUTIONS:  Restrictions/Precautions: Fall Risk  Vision: Impaired  Hearing: Exceptions to [facility]  Hearing Exceptions: Hard of hearing/hearing concerns    1038   [x] Recreation Therapy referral received. [x] Chart reviewed         [x] RT attempted    [] Recreation Therapy services partially discussed with patient. Note: Therapist introduced self and attempted to engage pt. Pt was sleepy. Pt unable to hold a conversation. Therapist reported to float RN and PCA he may need to lie down.      Electronically signed by: Maryana Camilo  Date: 9/8/2017   Electronically signed by Maryana Camilo on 9/8/2017 at 1:19 PM Admitted

## (undated) DEVICE — Z DISCONTINUED USE 2516375 APPLICATOR MEDICATED 3 CC CLR STRL CHLORAPREP

## (undated) DEVICE — TOWEL,OR,DSP,ST,BLUE,STD,4/PK,20PK/CS: Brand: MEDLINE

## (undated) DEVICE — TUBE SET 96 MM 64 MM H2O PERISTALTIC STD AUX CHANNEL

## (undated) DEVICE — LUBRICANT SURG JELLY ST BACTER TUBE 4.25OZ

## (undated) DEVICE — ADAPTER FLSH PMP FLD MGMT GI IRRIG OFP 2 DISPOSABLE

## (undated) DEVICE — GLOVE SURG SZ 85 STD WHT LTX SYN POLYMER BEAD REINF ANTI RL

## (undated) DEVICE — SONY PRINTER PAPER

## (undated) DEVICE — COVER,TABLE,44X90,STERILE: Brand: MEDLINE

## (undated) DEVICE — ENDO CARRY-ON PROCEDURE KIT: Brand: ENDO CARRY-ON PROCEDURE KIT

## (undated) DEVICE — CONMED SCOPE SAVER BITE BLOCK, 20X27 MM: Brand: SCOPE SAVER

## (undated) DEVICE — RESTRAINT EXTREMITY CONTACT CLOSURE

## (undated) DEVICE — STERILE POLYISOPRENE POWDER-FREE SURGICAL GLOVES: Brand: PROTEXIS

## (undated) DEVICE — Z DISCONTINUED PER STERIS KIT PEG INIT PLCMNT SAFT 20FR

## (undated) DEVICE — 2000CC GUARDIAN II: Brand: GUARDIAN

## (undated) DEVICE — Device: Brand: ENDO SMARTCAP

## (undated) DEVICE — AIRLIFE™ ADULT OXYGEN MASK VINYL, UNDER-THE-CHIN STYLE, MEDIUM CONCENTRATION MASK WITH 7 FEET (2.1 M) CRUSH-RESISTANT OXYGEN TUBING: Brand: AIRLIFE™

## (undated) DEVICE — LABEL MED MED CHPOR